# Patient Record
Sex: MALE | Race: WHITE | NOT HISPANIC OR LATINO
[De-identification: names, ages, dates, MRNs, and addresses within clinical notes are randomized per-mention and may not be internally consistent; named-entity substitution may affect disease eponyms.]

---

## 2017-01-05 ENCOUNTER — APPOINTMENT (OUTPATIENT)
Dept: HEART AND VASCULAR | Facility: CLINIC | Age: 67
End: 2017-01-05

## 2017-02-13 ENCOUNTER — APPOINTMENT (OUTPATIENT)
Dept: HEART AND VASCULAR | Facility: CLINIC | Age: 67
End: 2017-02-13

## 2017-02-13 VITALS
SYSTOLIC BLOOD PRESSURE: 132 MMHG | WEIGHT: 230 LBS | DIASTOLIC BLOOD PRESSURE: 78 MMHG | HEIGHT: 68 IN | BODY MASS INDEX: 34.86 KG/M2

## 2017-02-13 VITALS — HEART RATE: 64 BPM

## 2017-02-14 LAB — CK SERPL-CCNC: 349 U/L

## 2017-02-15 ENCOUNTER — FORM ENCOUNTER (OUTPATIENT)
Age: 67
End: 2017-02-15

## 2017-02-16 ENCOUNTER — INPATIENT (INPATIENT)
Facility: HOSPITAL | Age: 67
LOS: 7 days | Discharge: HOME | DRG: 234 | End: 2017-02-24
Attending: THORACIC SURGERY (CARDIOTHORACIC VASCULAR SURGERY) | Admitting: THORACIC SURGERY (CARDIOTHORACIC VASCULAR SURGERY)
Payer: MEDICARE

## 2017-02-16 ENCOUNTER — OUTPATIENT (OUTPATIENT)
Dept: OUTPATIENT SERVICES | Facility: HOSPITAL | Age: 67
LOS: 1 days | End: 2017-02-16
Payer: MEDICARE

## 2017-02-16 VITALS
HEART RATE: 81 BPM | DIASTOLIC BLOOD PRESSURE: 75 MMHG | RESPIRATION RATE: 18 BRPM | TEMPERATURE: 98 F | OXYGEN SATURATION: 98 % | SYSTOLIC BLOOD PRESSURE: 152 MMHG

## 2017-02-16 DIAGNOSIS — E11.9 TYPE 2 DIABETES MELLITUS WITHOUT COMPLICATIONS: ICD-10-CM

## 2017-02-16 DIAGNOSIS — I10 ESSENTIAL (PRIMARY) HYPERTENSION: ICD-10-CM

## 2017-02-16 DIAGNOSIS — I25.10 ATHEROSCLEROTIC HEART DISEASE OF NATIVE CORONARY ARTERY WITHOUT ANGINA PECTORIS: ICD-10-CM

## 2017-02-16 DIAGNOSIS — Z95.5 PRESENCE OF CORONARY ANGIOPLASTY IMPLANT AND GRAFT: Chronic | ICD-10-CM

## 2017-02-16 DIAGNOSIS — R94.39 ABNORMAL RESULT OF OTHER CARDIOVASCULAR FUNCTION STUDY: ICD-10-CM

## 2017-02-16 DIAGNOSIS — E78.5 HYPERLIPIDEMIA, UNSPECIFIED: ICD-10-CM

## 2017-02-16 DIAGNOSIS — N28.9 DISORDER OF KIDNEY AND URETER, UNSPECIFIED: ICD-10-CM

## 2017-02-16 LAB
ALBUMIN SERPL ELPH-MCNC: 3.6 G/DL — SIGNIFICANT CHANGE UP (ref 3.4–5)
ALP SERPL-CCNC: 50 U/L — SIGNIFICANT CHANGE UP (ref 40–120)
ALT FLD-CCNC: 48 U/L — HIGH (ref 12–42)
ANION GAP SERPL CALC-SCNC: 8 MMOL/L — LOW (ref 9–16)
APTT BLD: 31.8 SEC — SIGNIFICANT CHANGE UP (ref 27.5–37.4)
AST SERPL-CCNC: 34 U/L — SIGNIFICANT CHANGE UP (ref 15–37)
BASOPHILS NFR BLD AUTO: 0.5 % — SIGNIFICANT CHANGE UP (ref 0–2)
BILIRUB SERPL-MCNC: 0.5 MG/DL — SIGNIFICANT CHANGE UP (ref 0.2–1.2)
BUN SERPL-MCNC: 32 MG/DL — HIGH (ref 7–23)
CALCIUM SERPL-MCNC: 8.6 MG/DL — SIGNIFICANT CHANGE UP (ref 8.5–10.5)
CHLORIDE SERPL-SCNC: 107 MMOL/L — SIGNIFICANT CHANGE UP (ref 96–108)
CK MB CFR SERPL CALC: 2.5 NG/ML — SIGNIFICANT CHANGE UP (ref 0.5–3.6)
CK SERPL-CCNC: 332 U/L — HIGH (ref 39–308)
CO2 SERPL-SCNC: 23 MMOL/L — SIGNIFICANT CHANGE UP (ref 22–31)
CREAT SERPL-MCNC: 1.25 MG/DL — SIGNIFICANT CHANGE UP (ref 0.5–1.3)
EOSINOPHIL NFR BLD AUTO: 3.1 % — SIGNIFICANT CHANGE UP (ref 0–6)
GLUCOSE SERPL-MCNC: 148 MG/DL — HIGH (ref 70–99)
HCT VFR BLD CALC: 42.1 % — SIGNIFICANT CHANGE UP (ref 39–50)
HGB BLD-MCNC: 14.1 G/DL — SIGNIFICANT CHANGE UP (ref 13–17)
INR BLD: 1.04 — SIGNIFICANT CHANGE UP (ref 0.88–1.16)
LYMPHOCYTES # BLD AUTO: 26.8 % — SIGNIFICANT CHANGE UP (ref 13–44)
MCHC RBC-ENTMCNC: 31.6 PG — SIGNIFICANT CHANGE UP (ref 27–34)
MCHC RBC-ENTMCNC: 33.5 G/DL — SIGNIFICANT CHANGE UP (ref 32–36)
MCV RBC AUTO: 94.4 FL — SIGNIFICANT CHANGE UP (ref 80–100)
MONOCYTES NFR BLD AUTO: 7.8 % — SIGNIFICANT CHANGE UP (ref 2–14)
NEUTROPHILS NFR BLD AUTO: 61.8 % — SIGNIFICANT CHANGE UP (ref 43–77)
PLATELET # BLD AUTO: 182 K/UL — SIGNIFICANT CHANGE UP (ref 150–400)
POTASSIUM SERPL-MCNC: 4.7 MMOL/L — SIGNIFICANT CHANGE UP (ref 3.5–5.3)
POTASSIUM SERPL-SCNC: 4.7 MMOL/L — SIGNIFICANT CHANGE UP (ref 3.5–5.3)
PROT SERPL-MCNC: 7.3 G/DL — SIGNIFICANT CHANGE UP (ref 6.4–8.2)
PROTHROM AB SERPL-ACNC: 11.5 SEC — SIGNIFICANT CHANGE UP (ref 10–13.1)
RBC # BLD: 4.46 M/UL — SIGNIFICANT CHANGE UP (ref 4.2–5.8)
RBC # FLD: 14.2 % — SIGNIFICANT CHANGE UP (ref 10.3–16.9)
SODIUM SERPL-SCNC: 138 MMOL/L — SIGNIFICANT CHANGE UP (ref 135–145)
TROPONIN I SERPL-MCNC: <0.015 NG/ML — SIGNIFICANT CHANGE UP (ref 0.01–0.04)
WBC # BLD: 6.5 K/UL — SIGNIFICANT CHANGE UP (ref 3.8–10.5)
WBC # FLD AUTO: 6.5 K/UL — SIGNIFICANT CHANGE UP (ref 3.8–10.5)

## 2017-02-16 PROCEDURE — 78452 HT MUSCLE IMAGE SPECT MULT: CPT | Mod: 26

## 2017-02-16 PROCEDURE — 33533 CABG ARTERIAL SINGLE: CPT | Mod: AS

## 2017-02-16 PROCEDURE — 33517 CABG ARTERY-VEIN SINGLE: CPT | Mod: AS

## 2017-02-16 PROCEDURE — 93018 CV STRESS TEST I&R ONLY: CPT

## 2017-02-16 PROCEDURE — 71010: CPT | Mod: 26

## 2017-02-16 PROCEDURE — 93016 CV STRESS TEST SUPVJ ONLY: CPT

## 2017-02-16 PROCEDURE — 93010 ELECTROCARDIOGRAM REPORT: CPT

## 2017-02-16 PROCEDURE — 99285 EMERGENCY DEPT VISIT HI MDM: CPT | Mod: 25

## 2017-02-16 PROCEDURE — 93458 L HRT ARTERY/VENTRICLE ANGIO: CPT | Mod: 26

## 2017-02-16 PROCEDURE — 99222 1ST HOSP IP/OBS MODERATE 55: CPT

## 2017-02-16 RX ORDER — LISINOPRIL 2.5 MG/1
20 TABLET ORAL DAILY
Qty: 0 | Refills: 0 | Status: DISCONTINUED | OUTPATIENT
Start: 2017-02-16 | End: 2017-02-17

## 2017-02-16 RX ORDER — DEXTROSE 50 % IN WATER 50 %
25 SYRINGE (ML) INTRAVENOUS ONCE
Qty: 0 | Refills: 0 | Status: DISCONTINUED | OUTPATIENT
Start: 2017-02-16 | End: 2017-02-20

## 2017-02-16 RX ORDER — CHLORHEXIDINE GLUCONATE 213 G/1000ML
1 SOLUTION TOPICAL ONCE
Qty: 0 | Refills: 0 | Status: DISCONTINUED | OUTPATIENT
Start: 2017-02-16 | End: 2017-02-16

## 2017-02-16 RX ORDER — INSULIN LISPRO 100/ML
VIAL (ML) SUBCUTANEOUS
Qty: 0 | Refills: 0 | Status: DISCONTINUED | OUTPATIENT
Start: 2017-02-16 | End: 2017-02-20

## 2017-02-16 RX ORDER — SODIUM CHLORIDE 9 MG/ML
250 INJECTION INTRAMUSCULAR; INTRAVENOUS; SUBCUTANEOUS ONCE
Qty: 0 | Refills: 0 | Status: COMPLETED | OUTPATIENT
Start: 2017-02-16 | End: 2017-02-16

## 2017-02-16 RX ORDER — ASPIRIN/CALCIUM CARB/MAGNESIUM 324 MG
81 TABLET ORAL DAILY
Qty: 0 | Refills: 0 | Status: DISCONTINUED | OUTPATIENT
Start: 2017-02-16 | End: 2017-02-20

## 2017-02-16 RX ORDER — HEPARIN SODIUM 5000 [USP'U]/ML
INJECTION INTRAVENOUS; SUBCUTANEOUS
Qty: 25000 | Refills: 0 | Status: DISCONTINUED | OUTPATIENT
Start: 2017-02-16 | End: 2017-02-17

## 2017-02-16 RX ORDER — HEPARIN SODIUM 5000 [USP'U]/ML
6000 INJECTION INTRAVENOUS; SUBCUTANEOUS EVERY 6 HOURS
Qty: 0 | Refills: 0 | Status: DISCONTINUED | OUTPATIENT
Start: 2017-02-16 | End: 2017-02-17

## 2017-02-16 RX ORDER — SODIUM CHLORIDE 9 MG/ML
1000 INJECTION, SOLUTION INTRAVENOUS
Qty: 0 | Refills: 0 | Status: DISCONTINUED | OUTPATIENT
Start: 2017-02-16 | End: 2017-02-20

## 2017-02-16 RX ORDER — CLOPIDOGREL BISULFATE 75 MG/1
600 TABLET, FILM COATED ORAL ONCE
Qty: 0 | Refills: 0 | Status: COMPLETED | OUTPATIENT
Start: 2017-02-16 | End: 2017-02-16

## 2017-02-16 RX ORDER — SIMVASTATIN 20 MG/1
0 TABLET, FILM COATED ORAL
Qty: 0 | Refills: 0 | COMMUNITY

## 2017-02-16 RX ORDER — ASPIRIN/CALCIUM CARB/MAGNESIUM 324 MG
325 TABLET ORAL ONCE
Qty: 0 | Refills: 0 | Status: COMPLETED | OUTPATIENT
Start: 2017-02-16 | End: 2017-02-16

## 2017-02-16 RX ORDER — DEXTROSE 50 % IN WATER 50 %
1 SYRINGE (ML) INTRAVENOUS ONCE
Qty: 0 | Refills: 0 | Status: DISCONTINUED | OUTPATIENT
Start: 2017-02-16 | End: 2017-02-20

## 2017-02-16 RX ORDER — SODIUM CHLORIDE 9 MG/ML
1000 INJECTION INTRAMUSCULAR; INTRAVENOUS; SUBCUTANEOUS
Qty: 0 | Refills: 0 | Status: DISCONTINUED | OUTPATIENT
Start: 2017-02-16 | End: 2017-02-18

## 2017-02-16 RX ORDER — SIMVASTATIN 20 MG/1
40 TABLET, FILM COATED ORAL AT BEDTIME
Qty: 0 | Refills: 0 | Status: DISCONTINUED | OUTPATIENT
Start: 2017-02-16 | End: 2017-02-20

## 2017-02-16 RX ORDER — GLUCAGON INJECTION, SOLUTION 0.5 MG/.1ML
1 INJECTION, SOLUTION SUBCUTANEOUS ONCE
Qty: 0 | Refills: 0 | Status: DISCONTINUED | OUTPATIENT
Start: 2017-02-16 | End: 2017-02-20

## 2017-02-16 RX ORDER — DEXTROSE 50 % IN WATER 50 %
12.5 SYRINGE (ML) INTRAVENOUS ONCE
Qty: 0 | Refills: 0 | Status: DISCONTINUED | OUTPATIENT
Start: 2017-02-16 | End: 2017-02-20

## 2017-02-16 RX ORDER — HEPARIN SODIUM 5000 [USP'U]/ML
5000 INJECTION INTRAVENOUS; SUBCUTANEOUS ONCE
Qty: 0 | Refills: 0 | Status: COMPLETED | OUTPATIENT
Start: 2017-02-16 | End: 2017-02-16

## 2017-02-16 RX ADMIN — SODIUM CHLORIDE 500 MILLILITER(S): 9 INJECTION INTRAMUSCULAR; INTRAVENOUS; SUBCUTANEOUS at 15:41

## 2017-02-16 RX ADMIN — HEPARIN SODIUM 5000 UNIT(S): 5000 INJECTION INTRAVENOUS; SUBCUTANEOUS at 13:28

## 2017-02-16 RX ADMIN — HEPARIN SODIUM 1000 UNIT(S)/HR: 5000 INJECTION INTRAVENOUS; SUBCUTANEOUS at 13:28

## 2017-02-16 RX ADMIN — Medication 325 MILLIGRAM(S): at 14:22

## 2017-02-16 RX ADMIN — CLOPIDOGREL BISULFATE 600 MILLIGRAM(S): 75 TABLET, FILM COATED ORAL at 13:28

## 2017-02-16 NOTE — ED PROVIDER NOTE - MEDICAL DECISION MAKING DETAILS
hx of CAD- 5 cardiac stents- HTN  DM  abn stress test with MALAIKA  after 5 min on Navjot protocol- will cath today  iv heparin per dr bolden   no ekg changes noted on arrival to ED

## 2017-02-16 NOTE — H&P ADULT. - ASSESSMENT
67 y/o M PMHx HTN, hyperlipidemia, DM2, CAD s/p multiple PCI's @[facility] (05/24/2005 PCI to mLAD x2, pLAD and 04/18/2005 PCI to mid/prox/distal RCA, OM2), renal insufficiency (per pt) with recent abnormal NST and known CAD who is now being admitted to Cardiology with plan for cardiac catheterization with possible intervention if clinically warranted to assess CAD progression.

## 2017-02-16 NOTE — ED CLERICAL - NS ED CLERK NOTE PRE-ARRIVAL INFORMATION; ADDITIONAL PRE-ARRIVAL INFORMATION
Adaldi Nunes sent by dr Sharpe from stress inferior elevation now resolved and + finding on imaging needs admition for cath

## 2017-02-16 NOTE — PATIENT PROFILE ADULT. - REASON FOR ADMISSION
Went for a routine stress test and results were positive. Patient denies any symptoms prior to checkup/est.

## 2017-02-16 NOTE — ED ADULT NURSE NOTE - CHPI ED SYMPTOMS NEG
no nausea/no chills/no fever/no cough/no back pain/no syncope/no shortness of breath/no dizziness/no vomiting

## 2017-02-16 NOTE — ED ADULT NURSE NOTE - OBJECTIVE STATEMENT
Pt had stress this morning, was told to go to ED due to abnormal stress test, pt denies chest pain 0/10, shortness of breathe, palpitations, dizziness. Pt placed on cardiac continuous monitoring, NSR, wife with pt at bedside.

## 2017-02-16 NOTE — H&P ADULT. - PROBLEM SELECTOR PLAN 2
- BP controlled, in /75  - Continue Quinapril 20mg - 's upon arrival, currently 153/84. Will continue to monitor.  - Continue Quinapril 20mg

## 2017-02-16 NOTE — H&P ADULT. - PROBLEM SELECTOR PLAN 5
- In ED, BUN/Cr 32/ 1.25, GFR 59  -  cc IV bolus given prior to cardiac catheterization, followed by NS @ 75cc/hr  - EF 55% by verbal report from Dr. Sharpe's office, pt is euvolemic on exam and CTAB

## 2017-02-16 NOTE — H&P ADULT. - PROBLEM SELECTOR PLAN 1
- Pt currently CP free  - Troponin negative x 1, serial troponins  - EKG in ER NSR @ 75 BPM  - Given ASA 325mg and Plavix 600mg prior to cardiac cath  - Heparin gtt started in the ED  - Cardiac cath scheduled for today 02/16/2017 - Pt currently CP free  - Troponin negative x 1, serial troponins  - EKG in ER NSR @ 75 BPM  - Given ASA 325mg and Plavix 600mg prior to cardiac cath  - Heparin gtt started in the ED  - NPO prior to Cardiac Cath scheduled for today 02/16/2017

## 2017-02-16 NOTE — ED PROVIDER NOTE - OBJECTIVE STATEMENT
67 yo male with hx of CAD HTN DM HLD  5 cardiac stents  last stent 2005 off plavix in 2006 - scheduled for routine stress test today-  after 5 min of Navjot protocol dev CP  - no sob  no N/V - no diaphoresis- no syncope or palpitations -pt sent to Ed for further eval-  no current  CP or SOB

## 2017-02-16 NOTE — H&P ADULT. - HISTORY OF PRESENT ILLNESS
Pt is accompanied by wife Emmett Sharpe [phone]    65 y/o M PMH HTN, hyperlipidemia, DM2, CAD s/p multiple PCI's @[facility] (05/24/2005 PCI to mLAD x2 and pLAD and 04/18/2005 PCI to mid, prox and distal RCA and OM2), renal insufficiency who was sent to the [facility] ED 02/16/17 in the setting of an abnormal routine Nuclear Stress Test revealing moderately abnormal myocardial perfusion images consistent with stress-induced ischemia in the inferior wall. During the test he did not have chest pain, but he did have ST segment depression during exercise and ST elevation which resolved during recovery. He was recently seen by his cardiologist, Dr. Sharpe on 02/13/17 for a routine checkup. States lately he has been feeling well, however, prior to his prior abnormal angiogram and multiple PCI's he was asymptomatic. Denies any CP, SOB, palpitations, dizziness, PND, orthopnea. Of note, pt had an Echo performed 06/30/16 @ Dr. Sharpe's office which revealed LVEF 55% (per verbal report from physician's office).  In the ED, VSS /75, O2 99%, RR 16, T 98.3, EKG revealed NSR @ 74 BPM, Troponins negative x 1, BUN/ Cr 32/ 1.25, GFR 59. Pt was started on a Heparin gtt, given ASA 325mg and Plavix 600mg PO and given NS 250cc IV bolus bolus.    In light of pt's recent abnormal NST, risk factors as above, pt is now being admitted to Cardiology with plan for cardiac catheterization with possible intervention if clinically warranted to assess CAD progression. Pt is accompanied by wife Emmett Sharpe [phone]    67 y/o M PMH HTN, hyperlipidemia, DM2, CAD s/p multiple PCI's @[facility] (05/24/2005 PCI to mLAD x2, pLAD and 04/18/2005 PCI to mid/prox/distal RCA, OM2), renal insufficiency (per pt) who was sent to the [facility] ED 02/16/17 in the setting of an abnormal routine Nuclear Stress Test revealing moderately abnormal myocardial perfusion images consistent with stress-induced ischemia in the inferior wall. During the test he denied having chest pain, but he did have ST segment depression during exercise and ST elevation which resolved during recovery. He was recently seen by his cardiologist, Dr. Sharpe on 02/13/17 for a routine checkup. States lately he has been feeling well, however, prior to his previous abnormal angiogram and multiple PCI's he was asymptomatic. Denies any CP, SOB, palpitations, dizziness, PND, orthopnea, decreased ET. Of note, pt had an Echo performed 06/30/16 @ Dr. Sharpe's office which revealed LVEF 55% (per verbal report from physician's office).  In the ED, VSS /75, O2 99%, RR 16, T 98.3, EKG revealed NSR @ 74 BPM, Troponins negative x 1, BUN/ Cr 32/ 1.25, GFR 59. Pt was started on a Heparin gtt, given ASA 325mg and Plavix 600mg PO and given NS 250cc IV bolus bolus.    In light of pt's recent abnormal NST, risk factors as above, and known CAD pt is now being admitted to Cardiology with plan for cardiac catheterization with possible intervention if clinically warranted to assess CAD progression.

## 2017-02-17 DIAGNOSIS — I25.10 ATHEROSCLEROTIC HEART DISEASE OF NATIVE CORONARY ARTERY WITHOUT ANGINA PECTORIS: ICD-10-CM

## 2017-02-17 LAB
ANION GAP SERPL CALC-SCNC: 11 MMOL/L — SIGNIFICANT CHANGE UP (ref 9–16)
APPEARANCE UR: CLEAR — SIGNIFICANT CHANGE UP
BILIRUB UR-MCNC: NEGATIVE — SIGNIFICANT CHANGE UP
BUN SERPL-MCNC: 26 MG/DL — HIGH (ref 7–23)
CALCIUM SERPL-MCNC: 8.8 MG/DL — SIGNIFICANT CHANGE UP (ref 8.5–10.5)
CHLORIDE SERPL-SCNC: 106 MMOL/L — SIGNIFICANT CHANGE UP (ref 96–108)
CHOLEST SERPL-MCNC: 167 MG/DL — SIGNIFICANT CHANGE UP
CO2 SERPL-SCNC: 23 MMOL/L — SIGNIFICANT CHANGE UP (ref 22–31)
COLOR SPEC: YELLOW — SIGNIFICANT CHANGE UP
CREAT SERPL-MCNC: 1.21 MG/DL — SIGNIFICANT CHANGE UP (ref 0.5–1.3)
DIFF PNL FLD: (no result)
GLUCOSE SERPL-MCNC: 124 MG/DL — HIGH (ref 70–99)
GLUCOSE UR QL: 100
HBA1C BLD-MCNC: 7.6 % — HIGH (ref 4.8–5.6)
HCT VFR BLD CALC: 42.7 % — SIGNIFICANT CHANGE UP (ref 39–50)
HDLC SERPL-MCNC: 58 MG/DL — SIGNIFICANT CHANGE UP
HGB BLD-MCNC: 14.6 G/DL — SIGNIFICANT CHANGE UP (ref 13–17)
KETONES UR-MCNC: NEGATIVE — SIGNIFICANT CHANGE UP
LEUKOCYTE ESTERASE UR-ACNC: NEGATIVE — SIGNIFICANT CHANGE UP
LIPID PNL WITH DIRECT LDL SERPL: 87 MG/DL — SIGNIFICANT CHANGE UP
MAGNESIUM SERPL-MCNC: 2.3 MG/DL — SIGNIFICANT CHANGE UP (ref 1.6–2.4)
MCHC RBC-ENTMCNC: 31.4 PG — SIGNIFICANT CHANGE UP (ref 27–34)
MCHC RBC-ENTMCNC: 34.2 G/DL — SIGNIFICANT CHANGE UP (ref 32–36)
MCV RBC AUTO: 91.8 FL — SIGNIFICANT CHANGE UP (ref 80–100)
NITRITE UR-MCNC: NEGATIVE — SIGNIFICANT CHANGE UP
NT-PROBNP SERPL-SCNC: 139 PG/ML — SIGNIFICANT CHANGE UP
PH UR: 5 — SIGNIFICANT CHANGE UP (ref 4–8)
PLATELET # BLD AUTO: 203 K/UL — SIGNIFICANT CHANGE UP (ref 150–400)
POTASSIUM SERPL-MCNC: 4.2 MMOL/L — SIGNIFICANT CHANGE UP (ref 3.5–5.3)
POTASSIUM SERPL-SCNC: 4.2 MMOL/L — SIGNIFICANT CHANGE UP (ref 3.5–5.3)
PROT UR-MCNC: NEGATIVE MG/DL — SIGNIFICANT CHANGE UP
RBC # BLD: 4.65 M/UL — SIGNIFICANT CHANGE UP (ref 4.2–5.8)
RBC # FLD: 13.9 % — SIGNIFICANT CHANGE UP (ref 10.3–16.9)
SODIUM SERPL-SCNC: 140 MMOL/L — SIGNIFICANT CHANGE UP (ref 135–145)
SP GR SPEC: 1.02 — SIGNIFICANT CHANGE UP (ref 1–1.03)
TOTAL CHOLESTEROL/HDL RATIO MEASUREMENT: 2.9 RATIO — SIGNIFICANT CHANGE UP
TRIGL SERPL-MCNC: 109 MG/DL — SIGNIFICANT CHANGE UP
TSH SERPL-MCNC: 2.02 UIU/ML — SIGNIFICANT CHANGE UP (ref 0.35–4.94)
UROBILINOGEN FLD QL: 0.2 E.U./DL — SIGNIFICANT CHANGE UP
WBC # BLD: 7.8 K/UL — SIGNIFICANT CHANGE UP (ref 3.8–10.5)
WBC # FLD AUTO: 7.8 K/UL — SIGNIFICANT CHANGE UP (ref 3.8–10.5)

## 2017-02-17 PROCEDURE — 99233 SBSQ HOSP IP/OBS HIGH 50: CPT

## 2017-02-17 PROCEDURE — 93306 TTE W/DOPPLER COMPLETE: CPT | Mod: 26

## 2017-02-17 PROCEDURE — 93880 EXTRACRANIAL BILAT STUDY: CPT | Mod: 26

## 2017-02-17 PROCEDURE — 99223 1ST HOSP IP/OBS HIGH 75: CPT

## 2017-02-17 RX ORDER — LISINOPRIL 2.5 MG/1
10 TABLET ORAL DAILY
Qty: 0 | Refills: 0 | Status: DISCONTINUED | OUTPATIENT
Start: 2017-02-18 | End: 2017-02-18

## 2017-02-17 RX ORDER — BACITRACIN ZINC 500 UNIT/G
1 OINTMENT IN PACKET (EA) TOPICAL
Qty: 0 | Refills: 0 | Status: DISCONTINUED | OUTPATIENT
Start: 2017-02-17 | End: 2017-02-20

## 2017-02-17 RX ORDER — METOPROLOL TARTRATE 50 MG
12.5 TABLET ORAL DAILY
Qty: 0 | Refills: 0 | Status: DISCONTINUED | OUTPATIENT
Start: 2017-02-17 | End: 2017-02-17

## 2017-02-17 RX ORDER — METOPROLOL TARTRATE 50 MG
12.5 TABLET ORAL
Qty: 0 | Refills: 0 | Status: DISCONTINUED | OUTPATIENT
Start: 2017-02-17 | End: 2017-02-18

## 2017-02-17 RX ADMIN — Medication 1: at 11:46

## 2017-02-17 RX ADMIN — LISINOPRIL 20 MILLIGRAM(S): 2.5 TABLET ORAL at 07:30

## 2017-02-17 RX ADMIN — Medication 1 APPLICATION(S): at 22:50

## 2017-02-17 RX ADMIN — Medication 1: at 21:37

## 2017-02-17 RX ADMIN — Medication 81 MILLIGRAM(S): at 11:46

## 2017-02-17 RX ADMIN — SIMVASTATIN 40 MILLIGRAM(S): 20 TABLET, FILM COATED ORAL at 21:37

## 2017-02-17 RX ADMIN — Medication 12.5 MILLIGRAM(S): at 18:23

## 2017-02-17 NOTE — CONSULT NOTE ADULT - ASSESSMENT
66y Male w/ pmhx HTN, HLD, DM2, CAD s/p multiple PCI's at [facility] (5/24/05 PCI to mLADx2, pLAD and 4/18/05 PCI to mid/prox/distal RCA, OM2), renal insufficiency was sent to [facility] ED on 2/16 in the setting of an abnormal nuclear stress test revealing moderately abnormal myocardial perfusion images consistent with stress-induced ischemia in the inferior wall. Pt admitted to cardiology on 2/16 for cardiac catherization that revealed 3VCAD LM normal, midLAD 85%, proxD1 80%, midLCx 80%, proxRCA 100% with left to right collaterals, EF 55%, EDP 17. Pt is now consulted with cardiothoracic surgery for possible surgical intervention. pt denies any neurovascular accidents in the past.  -preoperative work up: pending carotid duplex b/l, bedside PFTs, TTE, labs: TSH, BNP, UA  -active T&S  -plan for possible CABG this Monday 66y Male w/ pmhx HTN, HLD, DM2, CAD s/p multiple PCI's at [facility] (5/24/05 PCI to mLADx2, pLAD and 4/18/05 PCI to mid/prox/distal RCA, OM2), renal insufficiency was sent to [facility] ED on 2/16 in the setting of an abnormal nuclear stress test revealing moderately abnormal myocardial perfusion images consistent with stress-induced ischemia in the inferior wall. Pt admitted to cardiology on 2/16 for cardiac catherization that revealed 3VCAD LM normal, midLAD 85%, proxD1 80%, midLCx 80%, proxRCA 100% with left to right collaterals, EF 55%, EDP 17. Pt is now consulted with cardiothoracic surgery for possible surgical intervention. pt denies any neurovascular accidents in the past.  -preoperative work up: pending carotid duplex b/l, bedside PFTs, TTE, labs: TSH, BNP, UA  -active T&S  -uptitrate lopressor as BP/HR can tolerate  -plan for possible CABG this Monday  -d/c lisinopril 24hours prior to surgical procedure

## 2017-02-17 NOTE — PROGRESS NOTE ADULT - ASSESSMENT
65 y/o M PMH HTN, hyperlipidemia, DM2, CAD s/p multiple PCI's @[facility] (05/24/2005 PCI to mLAD x2, pLAD and 04/18/2005 PCI to mid/prox/distal RCA, OM2), renal insufficiency (per pt), who had an abnl outpatient NST, s/p dx cardiac cath revealing 3VD. Now awaiting CTS on Monday.

## 2017-02-17 NOTE — PROGRESS NOTE ADULT - PROBLEM SELECTOR PLAN 4
FS, RAPHAEL, hold oral agents while in house. HgA1C 7.6%. Goal is <6%. Monitor FS, con't RAPHAEL, hold oral agents while in house.

## 2017-02-17 NOTE — PROGRESS NOTE ADULT - PROBLEM SELECTOR PLAN 1
s/p diagnostic cath on 2/16 revealing 85% mLAD, 80% pD1, 80%  mLCx, 100% pRCA w/ left to right collaterals, EF 55%, EDP 17.  CTS consulted.  Surgery planned for Monday 2/20. Pt to undergo pre-op workup. Will f/u results.   Con't Aspirin EC 81mg daily and Simvastatin 40mg QHS. s/p diagnostic cath on 2/16 revealing 85% mLAD, 80% pD1, 80%  mLCx, 100% pRCA w/ left to right collaterals, EF 55%, EDP 17.  CTS consulted (Dr Mckeon)   Surgery planned for Monday 2/20. Pt to undergo pre-op workup. Will f/u results.   Con't Aspirin EC 81mg daily and Simvastatin 40mg QHS.

## 2017-02-17 NOTE — CONSULT NOTE ADULT - SUBJECTIVE AND OBJECTIVE BOX
Surgeon: Nathaniel Mckeon    Requesting Physician: rKistofer Sharpe    HISTORY OF PRESENT ILLNESS:  66y Male w/ pmhx HTN, HLD, DM2, CAD s/p multiple PCI's at [facility] (05 PCI to mLADx2, pLAD and 05 PCI to mid/prox/distal RCA, OM2), renal insufficiency was sent to [facility] ED on  in the setting of an abnormal nuclear stress test revealing moderately abnormal myocardial perfusion images consistent with stress-induced ischemia in the inferior wall. Pt states he has been feeling well in the past couple of months; however, prior to his multiple PCI procedures in the past, patient was asymptomatic. Pt denies orthopnea, chest pain, COTA, SOB dizziness, syncope. Pt had     PAST MEDICAL & SURGICAL HISTORY:  HTN (hypertension)  HLD (hyperlipidemia)  DM type 2 (diabetes mellitus, type 2)  H/O heart artery stent      MEDICATIONS  (STANDING):  sodium chloride 0.9%. 1000milliLiter(s) IV Continuous <Continuous>  aspirin  chewable 81milliGRAM(s) Oral daily  lisinopril 20milliGRAM(s) Oral daily  simvastatin 40milliGRAM(s) Oral at bedtime  insulin lispro (HumaLOG) corrective regimen sliding scale  SubCutaneous Before meals and at bedtime  dextrose 5%. 1000milliLiter(s) IV Continuous <Continuous>  dextrose 50% Injectable 12.5Gram(s) IV Push once  dextrose 50% Injectable 25Gram(s) IV Push once  dextrose 50% Injectable 25Gram(s) IV Push once  metoprolol succinate ER 12.5milliGRAM(s) Oral daily    MEDICATIONS  (PRN):  dextrose Gel 1Dose(s) Oral once PRN Blood Glucose LESS THAN 70 milliGRAM(s)/deciliter  glucagon  Injectable 1milliGRAM(s) IntraMuscular once PRN Glucose LESS THAN 70 milligrams/deciliter      Allergies    No Known Allergies    Intolerances        SOCIAL HISTORY:  Smoker:  YES / NO        PACK YEARS:                         WHEN QUIT?  ETOH use:  YES / NO               FREQUENCY / QUANTITY:  Ilicit Drug use:  YES / NO  Occupation:  Assisted device use (Cane / Walker):  Live with:    FAMILY HISTORY:  No pertinent family history in first degree relatives      Review of Systems  CONSTITUTIONAL:  Fevers / chills, sweats, fatigue, weight loss, weight gain                                    NEGATIVE  NEURO:  parathesias, seizures, syncope, confusion                                                                               NEGATIVE  EYES:  Blurry vision, discharge, pain, loss of vision                                                                                  NEGATIVE  ENMT:  Difficulty hearing, vertigo, dysphagia, epistaxis, recent dental work                                     NEGATIVE  CV:  Chest pain, palpitations, COTA, orthopnea                                                                                         NEGATIVE  RESPIRATORY:  Wheezing, SOB, cough / sputum, hemoptysis                                                              NEGATIVE  GI:  Nausea, vommiting, diarrhea, constipation, melena                                                                        NEGATIVE  : Hematuria, dysuria, urgency, incontinence                                                                                       NEGATIVE  MUSKULOSKELETAL:  arthritis, joint swelling, muscle weakness                                                           NEGATIVE  SKIN/BREAST:  rash, itching, jermaine loss, masses                                                                                            NEGATIVE  PSYCH:  depresion, anxiety, suicidal ideation                                                                                             NEGATIVE  HEME/LYMPH:  bruises easily, enlarged lymph nodes, tender lymph nodes                                        NEGATIVE  ENDOCRINE:  cold intolerance, heat intolerance, polydipsia                                                                   NEGATIVE    PHYSICAL EXAM  Vital Signs Last 24 Hrs  T(C): 36.7, Max: 36.9 ( @ 22:02)  T(F): 98, Max: 98.4 ( @ 22:02)  HR: 77 (72 - 85)  BP: 148/84 (148/84 - 207/90)  BP(mean): 115 (100 - 115)  RR: 18 (16 - 18)  SpO2: 96% (96% - 100%)    CONSTITUTIONAL:                                                                          WNL  NEURO:                                                                                             WNL                      EYES:                                                                                                  WNL  ENMT:                                                                                                WNL  CV:                                                                                                      WNL  RESPIRATORY:                                                                                  WNL  GI:                                                                                                       WNL  : BENITEZ + / -                                                                                 WNL  MUSKULOSKELETAL:                                                                       WNL  SKIN / BREAST:                                                                                 WNL                                                          LABS:                        14.6   7.8   )-----------( 203      ( 2017 07:13 )             42.7     2017 07:12    140    |  106    |  26     ----------------------------<  124    4.2     |  23     |  1.21     Ca    8.8        2017 07:12  Mg     2.3       2017 07:12    TPro  7.3    /  Alb  3.6    /  TBili  0.5    /  DBili  x      /  AST  34     /  ALT  48     /  AlkPhos  50     2017 11:58    PT/INR - ( 2017 11:58 )   PT: 11.5 sec;   INR: 1.04          PTT - ( 2017 11:58 )  PTT:31.8 sec  Urinalysis Basic - ( 2017 11:28 )    Color: Yellow / Appearance: Clear / S.020 / pH: x  Gluc: x / Ketone: NEGATIVE  / Bili: NEGATIVE / Urobili: 0.2 E.U./dL   Blood: x / Protein: NEGATIVE mg/dL / Nitrite: NEGATIVE   Leuk Esterase: NEGATIVE / RBC: < 5 /HPF / WBC < 5 /HPF   Sq Epi: x / Non Sq Epi: Rare /HPF / Bacteria: Present /HPF      CARDIAC MARKERS ( 2017 11:58 )  <0.015 ng/mL / x     / 332 U/L / x     / 2.5 ng/mL          RADIOLOGY & ADDITIONAL STUDIES:  CAROTID U/S:    CXR:    CT Scan:    EKG:    TTE / DESEAN:    Cardiac Cath: Surgeon: Nathaniel Mckeon    Requesting Physician: Kristofer Sharpe    HISTORY OF PRESENT ILLNESS:  66y Male w/ pmhx HTN, HLD, DM2, CAD s/p multiple PCI's at [facility] (05 PCI to mLADx2, pLAD and 05 PCI to mid/prox/distal RCA, OM2), renal insufficiency was sent to [facility] ED on  in the setting of an abnormal nuclear stress test revealing moderately abnormal myocardial perfusion images consistent with stress-induced ischemia in the inferior wall. Pt states he has been feeling well in the past couple of months; however, prior to his multiple PCI procedures in the past, patient was asymptomatic. Pt denies orthopnea, chest pain, COTA, SOB dizziness, syncope. Pt admitted to cardiology on  for cardiac catherization that revealed 3VCAD LM normal, midLAD 85%, proxD1 80%, midLCx 80%, proxRCA 100% with left to right collaterals, EF 55%, EDP 17. Pt is now consulted for possible surgical intervention.    PAST MEDICAL & SURGICAL HISTORY:  HTN (hypertension)  HLD (hyperlipidemia)  DM type 2 (diabetes mellitus, type 2)  H/O heart artery stent      MEDICATIONS  (STANDING):  sodium chloride 0.9%. 1000milliLiter(s) IV Continuous <Continuous>  aspirin  chewable 81milliGRAM(s) Oral daily  lisinopril 20milliGRAM(s) Oral daily  simvastatin 40milliGRAM(s) Oral at bedtime  insulin lispro (HumaLOG) corrective regimen sliding scale  SubCutaneous Before meals and at bedtime  dextrose 5%. 1000milliLiter(s) IV Continuous <Continuous>  dextrose 50% Injectable 12.5Gram(s) IV Push once  dextrose 50% Injectable 25Gram(s) IV Push once  dextrose 50% Injectable 25Gram(s) IV Push once  metoprolol succinate ER 12.5milliGRAM(s) Oral daily    MEDICATIONS  (PRN):  dextrose Gel 1Dose(s) Oral once PRN Blood Glucose LESS THAN 70 milliGRAM(s)/deciliter  glucagon  Injectable 1milliGRAM(s) IntraMuscular once PRN Glucose LESS THAN 70 milligrams/deciliter      Allergies    No Known Allergies    Intolerances        SOCIAL HISTORY:  Smoker:  No  ETOH use: No  Ilicit Drug use: No  Occupation: retired. owned bakery  Assisted device use (Cane / Walker): None  Live with: spouse at home    FAMILY HISTORY:  No pertinent family history in first degree relatives      Review of Systems  CONSTITUTIONAL:  Fevers / chills, sweats, fatigue, weight loss, weight gain                                    NEGATIVE  NEURO:  parathesias, seizures, syncope, confusion                                                                               NEGATIVE  EYES:  Blurry vision, discharge, pain, loss of vision                                                                                  NEGATIVE  ENMT:  Difficulty hearing, vertigo, dysphagia, epistaxis, recent dental work                                     NEGATIVE  CV:  Chest pain, palpitations, COTA, orthopnea                                                                                         NEGATIVE  RESPIRATORY:  Wheezing, SOB, cough / sputum, hemoptysis                                                              NEGATIVE  GI:  Nausea, vommiting, diarrhea, constipation, melena                                                                        NEGATIVE  : Hematuria, dysuria, urgency, incontinence                                                                                       NEGATIVE  MUSKULOSKELETAL:  arthritis, joint swelling, muscle weakness                                                           NEGATIVE  SKIN/BREAST:  rash, itching, jermaine loss, masses                                                                                            NEGATIVE  PSYCH:  depresion, anxiety, suicidal ideation                                                                                             NEGATIVE  HEME/LYMPH:  bruises easily, enlarged lymph nodes, tender lymph nodes                                        NEGATIVE  ENDOCRINE:  cold intolerance, heat intolerance, polydipsia                                                                   NEGATIVE    PHYSICAL EXAM  Vital Signs Last 24 Hrs  T(C): 36.7, Max: 36.9 ( @ 22:02)  T(F): 98, Max: 98.4 (-16 @ 22:02)  HR: 77 (72 - 85)  BP: 148/84 (148/84 - 207/90)  BP(mean): 115 (100 - 115)  RR: 18 (16 - 18)  SpO2: 96% (96% - 100%)    CONSTITUTIONAL:                                                                          WNL  NEURO:                                                                                             WNL                      EYES:                                                                                                  WNL  ENMT:                                                                                                WNL  CV:       RESPIRATORY:      GI:    : BENITEZ + / -       MUSKULOSKELETAL:            SKIN / BREAST:                                                                            LABS:                        14.6   7.8   )-----------( 203      ( 2017 07:13 )             42.7     2017 07:12    140    |  106    |  26     ----------------------------<  124    4.2     |  23     |  1.21     Ca    8.8        2017 07:12  Mg     2.3       2017 07:12    TPro  7.3    /  Alb  3.6    /  TBili  0.5    /  DBili  x      /  AST  34     /  ALT  48     /  AlkPhos  50     2017 11:58    PT/INR - ( 2017 11:58 )   PT: 11.5 sec;   INR: 1.04          PTT - ( 2017 11:58 )  PTT:31.8 sec  Urinalysis Basic - ( 2017 11:28 )    Color: Yellow / Appearance: Clear / S.020 / pH: x  Gluc: x / Ketone: NEGATIVE  / Bili: NEGATIVE / Urobili: 0.2 E.U./dL   Blood: x / Protein: NEGATIVE mg/dL / Nitrite: NEGATIVE   Leuk Esterase: NEGATIVE / RBC: < 5 /HPF / WBC < 5 /HPF   Sq Epi: x / Non Sq Epi: Rare /HPF / Bacteria: Present /HPF      CARDIAC MARKERS ( 2017 11:58 )  <0.015 ng/mL / x     / 332 U/L / x     / 2.5 ng/mL          RADIOLOGY & ADDITIONAL STUDIES:  CAROTID U/S:    CXR:    CT Scan:    EKG:    TTE / DESEAN:    Cardiac Cath: Surgeon: Nathaniel Mckeon    Requesting Physician: Kristofer Sharpe    HISTORY OF PRESENT ILLNESS:  66y Male w/ pmhx HTN, HLD, DM2, CAD s/p multiple PCI's at [facility] (05 PCI to mLADx2, pLAD and 05 PCI to mid/prox/distal RCA, OM2), renal insufficiency was sent to [facility] ED on  in the setting of an abnormal nuclear stress test revealing moderately abnormal myocardial perfusion images consistent with stress-induced ischemia in the inferior wall. Pt states he has been feeling well in the past couple of months; however, prior to his multiple PCI procedures in the past, patient was asymptomatic. Pt denies orthopnea, chest pain, COTA, SOB dizziness, syncope. Pt admitted to cardiology on  for cardiac catherization that revealed 3VCAD LM normal, midLAD 85%, proxD1 80%, midLCx 80%, proxRCA 100% with left to right collaterals, EF 55%, EDP 17. Pt is now consulted with cardiothoracic surgery for possible surgical intervention.    PAST MEDICAL & SURGICAL HISTORY:  HTN (hypertension)  HLD (hyperlipidemia)  DM type 2 (diabetes mellitus, type 2)  H/O heart artery stent      MEDICATIONS  (STANDING):  sodium chloride 0.9%. 1000milliLiter(s) IV Continuous <Continuous>  aspirin  chewable 81milliGRAM(s) Oral daily  lisinopril 20milliGRAM(s) Oral daily  simvastatin 40milliGRAM(s) Oral at bedtime  insulin lispro (HumaLOG) corrective regimen sliding scale  SubCutaneous Before meals and at bedtime  dextrose 5%. 1000milliLiter(s) IV Continuous <Continuous>  dextrose 50% Injectable 12.5Gram(s) IV Push once  dextrose 50% Injectable 25Gram(s) IV Push once  dextrose 50% Injectable 25Gram(s) IV Push once  metoprolol succinate ER 12.5milliGRAM(s) Oral daily    MEDICATIONS  (PRN):  dextrose Gel 1Dose(s) Oral once PRN Blood Glucose LESS THAN 70 milliGRAM(s)/deciliter  glucagon  Injectable 1milliGRAM(s) IntraMuscular once PRN Glucose LESS THAN 70 milligrams/deciliter      Allergies    No Known Allergies          SOCIAL HISTORY:  Smoker:  No  ETOH use: No  Ilicit Drug use: No  Occupation: retired. owned bakery  Assisted device use (Cane / Walker): None  Live with: spouse at home    FAMILY HISTORY:  No pertinent family history in first degree relatives      Review of Systems  CONSTITUTIONAL:  Fevers / chills, sweats, fatigue, weight loss, weight gain                                    NEGATIVE  NEURO:  parathesias, seizures, syncope, confusion                                                                               NEGATIVE  EYES:  Blurry vision, discharge, pain, loss of vision                                                                                  NEGATIVE  ENMT:  Difficulty hearing, vertigo, dysphagia, epistaxis, recent dental work                                     NEGATIVE  CV:  Chest pain, palpitations, COTA, orthopnea                                                                                         NEGATIVE  RESPIRATORY:  Wheezing, SOB, cough / sputum, hemoptysis                                                              NEGATIVE  GI:  Nausea, vommiting, diarrhea, constipation, melena                                                                        NEGATIVE  : Hematuria, dysuria, urgency, incontinence                                                                                       NEGATIVE  MUSKULOSKELETAL:  arthritis, joint swelling, muscle weakness                                                           NEGATIVE  SKIN/BREAST:  rash, itching, jermaine loss, masses                                                                                            NEGATIVE  PSYCH:  depresion, anxiety, suicidal ideation                                                                                             NEGATIVE  HEME/LYMPH:  bruises easily, enlarged lymph nodes, tender lymph nodes                                        NEGATIVE  ENDOCRINE:  cold intolerance, heat intolerance, polydipsia                                                                   NEGATIVE    PHYSICAL EXAM  Vital Signs Last 24 Hrs  T(C): 36.7, Max: 36.9 (16 @ 22:02)  T(F): 98, Max: 98.4 (02-16 @ 22:02)  HR: 77 (72 - 85)  BP: 148/84 (148/84 - 207/90)  BP(mean): 115 (100 - 115)  RR: 18 (16 - 18)  SpO2: 96% (96% - 100%)    CONSTITUTIONAL:  well appearing male, conversing in full sentences in NAD  NEURO: A&Ox3, no focal deficits  EYES: EOMI, PERRLA  ENMT: nares patent, no JVD  CV: RRR, normal S1S2, no murmur appreciated  RESPIRATORY: CTAB, no wheezes/rales/rhonchi  GI: obese, soft, NT/ND  MUSKULOSKELETAL: sensation intact with motor function  VASCULAR: 2+ pedal pulses b/l, 2+ radial pulses b/l  SKIN / BREAST: no peripheral edema b/l, good color, WWP                                                          LABS:                        14.6   7.8   )-----------( 203      ( 2017 07:13 )             42.7     2017 07:12    140    |  106    |  26     ----------------------------<  124    4.2     |  23     |  1.21     Ca    8.8        2017 07:12  Mg     2.3       2017 07:12    TPro  7.3    /  Alb  3.6    /  TBili  0.5    /  DBili  x      /  AST  34     /  ALT  48     /  AlkPhos  50     2017 11:58    PT/INR - ( 2017 11:58 )   PT: 11.5 sec;   INR: 1.04          PTT - ( 2017 11:58 )  PTT:31.8 sec  Urinalysis Basic - ( 2017 11:28 )    Color: Yellow / Appearance: Clear / S.020 / pH: x  Gluc: x / Ketone: NEGATIVE  / Bili: NEGATIVE / Urobili: 0.2 E.U./dL   Blood: x / Protein: NEGATIVE mg/dL / Nitrite: NEGATIVE   Leuk Esterase: NEGATIVE / RBC: < 5 /HPF / WBC < 5 /HPF   Sq Epi: x / Non Sq Epi: Rare /HPF / Bacteria: Present /HPF      CARDIAC MARKERS ( 2017 11:58 )  <0.015 ng/mL / x     / 332 U/L / x     / 2.5 ng/mL          RADIOLOGY & ADDITIONAL STUDIES:  CAROTID U/S: pending    CXR:   EXAM:  XR CHEST 1 VIEW PORT IMMEDIATE                        PROCEDURE DATE:  2017    INTERPRETATION:  CLINICAL INDICATION: 66-year-old with chest pain.  FINDINGS: Portable view of the chest demonstrates low lung volumes.  Midline trachea. Normal heart size. No consolidation. No pleural  effusion. No active chest disease.    EK17 revealed NSR at 75bpm    Cardiac Cath:  Cardiac Cath 2017 3VCAD LM normal, midLAD 85%, proxD1 80%, midLCx 80%, proxRCA 100% with left to right collaterals, EF 55%, EDP 17

## 2017-02-17 NOTE — PROGRESS NOTE ADULT - SUBJECTIVE AND OBJECTIVE BOX
Interventional Cardiology PA Adult Progress Note    Subjective Assessment:  	  MEDICATIONS:  lisinopril 20milliGRAM(s) Oral daily            simvastatin 40milliGRAM(s) Oral at bedtime  insulin lispro (HumaLOG) corrective regimen sliding scale  SubCutaneous Before meals and at bedtime  dextrose Gel 1Dose(s) Oral once PRN  dextrose 50% Injectable 12.5Gram(s) IV Push once  dextrose 50% Injectable 25Gram(s) IV Push once  dextrose 50% Injectable 25Gram(s) IV Push once  glucagon  Injectable 1milliGRAM(s) IntraMuscular once PRN    sodium chloride 0.9%. 1000milliLiter(s) IV Continuous <Continuous>  aspirin  chewable 81milliGRAM(s) Oral daily  dextrose 5%. 1000milliLiter(s) IV Continuous <Continuous>      	    [PHYSICAL EXAM:  TELEMETRY:  T(C): 36.7, Max: 36.9 (02-16 @ 22:02)  HR: 77 (18 - 85)  BP: 148/84 (148/84 - 207/90)  RR: 18 (16 - 18)  SpO2: 96% (96% - 100%)  Wt(kg): --  I&O's Summary    I & Os for current day (as of 17 Feb 2017 11:24)  =============================================  IN: 0 ml / OUT: 600 ml / NET: -600 ml      Weight (kg): 104.3 (02-16 @ 13:04)  Carrillo:  Central/PICC/Mid Line:                                         Appearance: Normal	  HEENT:   Normal oral mucosa, PERRL, EOMI	  Neck: Supple, + JVD/ - JVD; Carotid Bruit   Cardiovascular: Normal S1 S2, No JVD, No murmurs,   Respiratory: Lungs clear to auscultation/Decreased Breath Sounds/No Rales, Rhonchi, Wheezing	  Gastrointestinal:  Soft, Non-tender, + BS	  Skin: No rashes, No ecchymoses, No cyanosis  Extremities: Normal range of motion, No clubbing, cyanosis or edema  Vascular: Peripheral pulses palpable 2+ bilaterally  Neurologic: Non-focal  Psychiatry: A & O x 3, Mood & affect appropriate      	    ECG:  	  RADIOLOGY:   DIAGNOSTIC TESTING:  [ ] Echocardiogram:  [ ]  Catheterization:  [ ] Stress Test:    [ ] DESEAN  OTHER: 	    LABS:	 	  CARDIAC MARKERS:  Troponin I, Serum: <0.015 ng/mL (02-16 @ 11:58)          Troponin I, Serum: <0.015 ng/mL (02-16 @ 11:58)                          14.6   7.8   )-----------( 203      ( 17 Feb 2017 07:13 )             42.7     17 Feb 2017 07:12    140    |  106    |  26     ----------------------------<  124    4.2     |  23     |  1.21     Ca    8.8        17 Feb 2017 07:12  Mg     2.3       17 Feb 2017 07:12    TPro  7.3    /  Alb  3.6    /  TBili  0.5    /  DBili  x      /  AST  34     /  ALT  48     /  AlkPhos  50     16 Feb 2017 11:58    proBNP:   Lipid Profile:   HgA1c: Hemoglobin A1C, Whole Blood: 7.6 % (02-17 @ 07:13)    TSH:   PT/INR - ( 16 Feb 2017 11:58 )   PT: 11.5 sec;   INR: 1.04          PTT - ( 16 Feb 2017 11:58 )  PTT:31.8 sec            DVT ppx: OOB as tolerated encouraged     Dispo: Awaiting CTS on Monday Interventional Cardiology PA Adult Progress Note    Subjective Assessment: Pt seen and examined at bedside. Denies any acute symptoms.   	  MEDICATIONS:  lisinopril 20milliGRAM(s) Oral daily            simvastatin 40milliGRAM(s) Oral at bedtime  insulin lispro (HumaLOG) corrective regimen sliding scale  SubCutaneous Before meals and at bedtime  dextrose Gel 1Dose(s) Oral once PRN  dextrose 50% Injectable 12.5Gram(s) IV Push once  dextrose 50% Injectable 25Gram(s) IV Push once  dextrose 50% Injectable 25Gram(s) IV Push once  glucagon  Injectable 1milliGRAM(s) IntraMuscular once PRN    sodium chloride 0.9%. 1000milliLiter(s) IV Continuous <Continuous>  aspirin  chewable 81milliGRAM(s) Oral daily  dextrose 5%. 1000milliLiter(s) IV Continuous <Continuous>      	    [PHYSICAL EXAM:  TELEMETRY:  T(C): 36.7, Max: 36.9 (02-16 @ 22:02)  HR: 77 (18 - 85)  BP: 148/84 (148/84 - 207/90)  RR: 18 (16 - 18)  SpO2: 96% (96% - 100%)  Wt(kg): --  I&O's Summary    I & Os for current day (as of 17 Feb 2017 11:24)  =============================================  IN: 0 ml / OUT: 600 ml / NET: -600 ml      Weight (kg): 104.3 (02-16 @ 13:04)      Appearance: Normal	  Neck: Supple, no JVD   Cardiovascular: RRR, S1 S2 no murmurs   Respiratory: Lungs clear to auscultation b/l 	  Gastrointestinal:  +BS soft NT/ ND   Extremities: Normal range of motion, No edema b/l   Right wrist: no hematoma, + ecchymosis, still oozing slight, reapplied a clean dressing, 2+ radial pulse   Neurologic: Non-focal  Psychiatry: A & O x 3, Mood & affect appropriate        LABS:	 	  CARDIAC MARKERS:  Troponin I, Serum: <0.015 ng/mL (02-16 @ 11:58)          Troponin I, Serum: <0.015 ng/mL (02-16 @ 11:58)                          14.6   7.8   )-----------( 203      ( 17 Feb 2017 07:13 )             42.7     17 Feb 2017 07:12    140    |  106    |  26     ----------------------------<  124    4.2     |  23     |  1.21     Ca    8.8        17 Feb 2017 07:12  Mg     2.3       17 Feb 2017 07:12    TPro  7.3    /  Alb  3.6    /  TBili  0.5    /  DBili  x      /  AST  34     /  ALT  48     /  AlkPhos  50     16 Feb 2017 11:58    proBNP:   Lipid Profile:   HgA1c: Hemoglobin A1C, Whole Blood: 7.6 % (02-17 @ 07:13)    TSH:   PT/INR - ( 16 Feb 2017 11:58 )   PT: 11.5 sec;   INR: 1.04          PTT - ( 16 Feb 2017 11:58 )  PTT:31.8 sec            DVT ppx: OOB as tolerated encouraged     Dispo: Awaiting CTS on Monday Interventional Cardiology PA Adult Progress Note    Subjective Assessment: Pt seen and examined at bedside. Denies any acute symptoms.   	  MEDICATIONS:  lisinopril 20milliGRAM(s) Oral daily            simvastatin 40milliGRAM(s) Oral at bedtime  insulin lispro (HumaLOG) corrective regimen sliding scale  SubCutaneous Before meals and at bedtime  dextrose Gel 1Dose(s) Oral once PRN  dextrose 50% Injectable 12.5Gram(s) IV Push once  dextrose 50% Injectable 25Gram(s) IV Push once  dextrose 50% Injectable 25Gram(s) IV Push once  glucagon  Injectable 1milliGRAM(s) IntraMuscular once PRN    sodium chloride 0.9%. 1000milliLiter(s) IV Continuous <Continuous>  aspirin  chewable 81milliGRAM(s) Oral daily  dextrose 5%. 1000milliLiter(s) IV Continuous <Continuous>      	    [PHYSICAL EXAM:  TELEMETRY:  T(C): 36.7, Max: 36.9 (02-16 @ 22:02)  HR: 77 (18 - 85)  BP: 148/84 (148/84 - 207/90)  RR: 18 (16 - 18)  SpO2: 96% (96% - 100%)  Wt(kg): --  I&O's Summary    I & Os for current day (as of 17 Feb 2017 11:24)  =============================================  IN: 0 ml / OUT: 600 ml / NET: -600 ml      Weight (kg): 104.3 (02-16 @ 13:04)      Appearance: Normal	  Neck: Supple, no JVD   Cardiovascular: RRR, S1 S2 no murmurs   Respiratory: Lungs clear to auscultation b/l 	  Gastrointestinal:  +BS soft NT/ ND   Extremities: Normal range of motion, No edema b/l   Right wrist: no hematoma, + ecchymosis, still oozing slightly, reapplied a clean dressing, 2+ radial pulse   Neurologic: Non-focal  Psychiatry: A & O x 3, Mood & affect appropriate        LABS:	 	  CARDIAC MARKERS:  Troponin I, Serum: <0.015 ng/mL (02-16 @ 11:58)          Troponin I, Serum: <0.015 ng/mL (02-16 @ 11:58)                          14.6   7.8   )-----------( 203      ( 17 Feb 2017 07:13 )             42.7     17 Feb 2017 07:12    140    |  106    |  26     ----------------------------<  124    4.2     |  23     |  1.21     Ca    8.8        17 Feb 2017 07:12  Mg     2.3       17 Feb 2017 07:12    TPro  7.3    /  Alb  3.6    /  TBili  0.5    /  DBili  x      /  AST  34     /  ALT  48     /  AlkPhos  50     16 Feb 2017 11:58    proBNP:   Lipid Profile:   HgA1c: Hemoglobin A1C, Whole Blood: 7.6 % (02-17 @ 07:13)    TSH:   PT/INR - ( 16 Feb 2017 11:58 )   PT: 11.5 sec;   INR: 1.04          PTT - ( 16 Feb 2017 11:58 )  PTT:31.8 sec            DVT ppx: OOB as tolerated encouraged     Dispo: Awaiting CTS on Monday

## 2017-02-18 LAB
ANION GAP SERPL CALC-SCNC: 9 MMOL/L — SIGNIFICANT CHANGE UP (ref 9–16)
BUN SERPL-MCNC: 33 MG/DL — HIGH (ref 7–23)
CALCIUM SERPL-MCNC: 8.4 MG/DL — LOW (ref 8.5–10.5)
CHLORIDE SERPL-SCNC: 108 MMOL/L — SIGNIFICANT CHANGE UP (ref 96–108)
CO2 SERPL-SCNC: 23 MMOL/L — SIGNIFICANT CHANGE UP (ref 22–31)
CREAT SERPL-MCNC: 1.37 MG/DL — HIGH (ref 0.5–1.3)
GLUCOSE SERPL-MCNC: 123 MG/DL — HIGH (ref 70–99)
HCT VFR BLD CALC: 40.5 % — SIGNIFICANT CHANGE UP (ref 39–50)
HGB BLD-MCNC: 13.3 G/DL — SIGNIFICANT CHANGE UP (ref 13–17)
MAGNESIUM SERPL-MCNC: 2.3 MG/DL — SIGNIFICANT CHANGE UP (ref 1.6–2.4)
MCHC RBC-ENTMCNC: 31.2 PG — SIGNIFICANT CHANGE UP (ref 27–34)
MCHC RBC-ENTMCNC: 32.8 G/DL — SIGNIFICANT CHANGE UP (ref 32–36)
MCV RBC AUTO: 95.1 FL — SIGNIFICANT CHANGE UP (ref 80–100)
PLATELET # BLD AUTO: 159 K/UL — SIGNIFICANT CHANGE UP (ref 150–400)
POTASSIUM SERPL-MCNC: 4.4 MMOL/L — SIGNIFICANT CHANGE UP (ref 3.5–5.3)
POTASSIUM SERPL-SCNC: 4.4 MMOL/L — SIGNIFICANT CHANGE UP (ref 3.5–5.3)
RBC # BLD: 4.26 M/UL — SIGNIFICANT CHANGE UP (ref 4.2–5.8)
RBC # FLD: 14.7 % — SIGNIFICANT CHANGE UP (ref 10.3–16.9)
SODIUM SERPL-SCNC: 140 MMOL/L — SIGNIFICANT CHANGE UP (ref 135–145)
WBC # BLD: 6.5 K/UL — SIGNIFICANT CHANGE UP (ref 3.8–10.5)
WBC # FLD AUTO: 6.5 K/UL — SIGNIFICANT CHANGE UP (ref 3.8–10.5)

## 2017-02-18 RX ORDER — METOPROLOL TARTRATE 50 MG
25 TABLET ORAL
Qty: 0 | Refills: 0 | Status: DISCONTINUED | OUTPATIENT
Start: 2017-02-18 | End: 2017-02-20

## 2017-02-18 RX ORDER — LISINOPRIL 2.5 MG/1
5 TABLET ORAL DAILY
Qty: 0 | Refills: 0 | Status: DISCONTINUED | OUTPATIENT
Start: 2017-02-18 | End: 2017-02-18

## 2017-02-18 RX ORDER — SODIUM CHLORIDE 9 MG/ML
1000 INJECTION INTRAMUSCULAR; INTRAVENOUS; SUBCUTANEOUS
Qty: 0 | Refills: 0 | Status: DISCONTINUED | OUTPATIENT
Start: 2017-02-18 | End: 2017-02-20

## 2017-02-18 RX ADMIN — Medication 1 APPLICATION(S): at 18:04

## 2017-02-18 RX ADMIN — SODIUM CHLORIDE 500 MILLILITER(S): 9 INJECTION INTRAMUSCULAR; INTRAVENOUS; SUBCUTANEOUS at 08:40

## 2017-02-18 RX ADMIN — Medication 1 APPLICATION(S): at 07:35

## 2017-02-18 RX ADMIN — LISINOPRIL 10 MILLIGRAM(S): 2.5 TABLET ORAL at 07:41

## 2017-02-18 RX ADMIN — Medication 81 MILLIGRAM(S): at 11:44

## 2017-02-18 RX ADMIN — Medication 1: at 11:44

## 2017-02-18 RX ADMIN — SIMVASTATIN 40 MILLIGRAM(S): 20 TABLET, FILM COATED ORAL at 20:56

## 2017-02-18 RX ADMIN — Medication 25 MILLIGRAM(S): at 18:04

## 2017-02-18 RX ADMIN — Medication 12.5 MILLIGRAM(S): at 07:36

## 2017-02-18 NOTE — PROGRESS NOTE ADULT - PROBLEM SELECTOR PLAN 5
Stage II CKD   Cr 1.3. Will con't to monitor Stage III CKD   Cr 1.3. Gave 1 bolus of NS. Will con't to monitor.

## 2017-02-18 NOTE — PROGRESS NOTE ADULT - SUBJECTIVE AND OBJECTIVE BOX
INTERVAL HISTORY:  	  No chest pain, dyspnea.    MEDICATIONS:  metoprolol 12.5milliGRAM(s) Oral <User Schedule>  lisinopril 10milliGRAM(s) Oral daily  simvastatin 40milliGRAM(s) Oral at bedtime  insulin lispro (HumaLOG) corrective regimen sliding scale  SubCutaneous Before meals and at bedtime  dextrose Gel 1Dose(s) Oral once PRN  dextrose 50% Injectable 12.5Gram(s) IV Push once  dextrose 50% Injectable 25Gram(s) IV Push once  dextrose 50% Injectable 25Gram(s) IV Push once  glucagon  Injectable 1milliGRAM(s) IntraMuscular once PRN    aspirin  chewable 81milliGRAM(s) Oral daily  dextrose 5%. 1000milliLiter(s) IV Continuous <Continuous>  BACItracin   Ointment 1Application(s) Topical two times a day  sodium chloride 0.9%. 1000milliLiter(s) IV Continuous <Continuous>      Allergies    No Known Allergies    Intolerances          PHYSICAL EXAM:  T(C): 36.2, Max: 37.1 (02-17 @ 20:42)  HR: 81 (64 - 81)  BP: 148/81 (100/57 - 148/81)  RR: 16 (16 - 18)  SpO2: --  Wt(kg): --  I&O's Summary    I & Os for current day (as of 18 Feb 2017 09:56)  =============================================  IN: 0 ml / OUT: 600 ml / NET: -600 ml    Height (cm): 172.7 (02-17 @ 13:11)    Appearance: Normal	  Cardiovascular: Normal S1 S2, No murmurs, rubs or gallops  Respiratory: Lungs clear to auscultation	  Psychiatry: A & O x 3, Mood & affect appropriate  Gastrointestinal:  Soft, NT/ND, + BS, No HSM. No masses	  Skin: No rashes, No ecchymoses  Neurologic: Non-focal  Extremities:No clubbing, cyanosis or edema      TELEMETRY: 	    ECG:  	  RADIOLOGY:   DIAGNOSTIC TESTING:  [ ] Echocardiogram:  [ ]  Catheterization:  [ ] Stress Test:    OTHER: 	    LABS:	 	    CARDIAC MARKERS:                                  13.3   6.5   )-----------( 159      ( 18 Feb 2017 07:05 )             40.5     18 Feb 2017 07:04    140    |  108    |  33     ----------------------------<  123    4.4     |  23     |  1.37     Ca    8.4        18 Feb 2017 07:04  Mg     2.3       18 Feb 2017 07:04    TPro  7.3    /  Alb  3.6    /  TBili  0.5    /  DBili  x      /  AST  34     /  ALT  48     /  AlkPhos  50     16 Feb 2017 11:58    proBNP: Serum Pro-Brain Natriuretic Peptide: 139 pg/mL (02-17 @ 13:05)    Lipid Profile:   HgA1c:   TSH: Thyroid Stimulating Hormone, Serum: 2.021 uIU/mL (02-17 @ 13:05)    PT/INR - ( 16 Feb 2017 11:58 )   PT: 11.5 sec;   INR: 1.04          PTT - ( 16 Feb 2017 11:58 )  PTT:31.8 sec  ASSESSMENT/PLAN: 	  ASHD- Severe 3VD, for CABG monday  HTN- under good control  Hyoerlipidemia- statin

## 2017-02-18 NOTE — PROGRESS NOTE ADULT - ASSESSMENT
65 y/o M PMH HTN, hyperlipidemia, DM2, CAD s/p [facility]'s [facility] (05/24/2005 PCI to mLAD x2, pLAD and 04/18/2005 PCI to mid/prox/distal RCA, OM2), renal insufficiency (per pt), who had an abnl outpatient NST, s/p dx cardiac cath revealing 3VD. Now awaiting CTS on Monday. 67 y/o M PMH HTN, hyperlipidemia, DM2, CAD s/p multiple PCI's @[facility] (05/24/2005 PCI to mLAD x2, pLAD and 04/18/2005 PCI to mid/prox/distal RCA, OM2), renal insufficiency, who had an abnl outpatient NST, s/p dx cardiac cath revealing 3VD. Now awaiting CTS on Monday.

## 2017-02-18 NOTE — PROGRESS NOTE ADULT - SUBJECTIVE AND OBJECTIVE BOX
Interventional Cardiology PA Adult Progress Note    Subjective Assessment: Pt seen and examined at bedside. Denies any acute symptoms. Pt states he feels well.   	  MEDICATIONS:  metoprolol 12.5milliGRAM(s) Oral <User Schedule>  lisinopril 10milliGRAM(s) Oral daily            simvastatin 40milliGRAM(s) Oral at bedtime  insulin lispro (HumaLOG) corrective regimen sliding scale  SubCutaneous Before meals and at bedtime  dextrose Gel 1Dose(s) Oral once PRN  dextrose 50% Injectable 12.5Gram(s) IV Push once  dextrose 50% Injectable 25Gram(s) IV Push once  dextrose 50% Injectable 25Gram(s) IV Push once  glucagon  Injectable 1milliGRAM(s) IntraMuscular once PRN    aspirin  chewable 81milliGRAM(s) Oral daily  dextrose 5%. 1000milliLiter(s) IV Continuous <Continuous>  BACItracin   Ointment 1Application(s) Topical two times a day  sodium chloride 0.9%. 1000milliLiter(s) IV Continuous <Continuous>      	    [PHYSICAL EXAM:  TELEMETRY:  T(C): 36.2, Max: 37.1 (02-17 @ 20:42)  HR: 64 (64 - 81)  BP: 131/62 (100/57 - 148/81)  RR: 18 (16 - 18)  SpO2: --  Wt(kg): --  I&O's Summary    I & Os for current day (as of 18 Feb 2017 12:44)  =============================================  IN: 0 ml / OUT: 600 ml / NET: -600 ml    Height (cm): 172.7 (02-17 @ 13:11)  Carrillo:  Central/PICC/Mid Line:                                         Appearance: Normal	  HEENT:   Normal oral mucosa, PERRL, EOMI	  Neck: Supple, + JVD/ - JVD; Carotid Bruit   Cardiovascular: Normal S1 S2, No JVD, No murmurs,   Respiratory: Lungs clear to auscultation/Decreased Breath Sounds/No Rales, Rhonchi, Wheezing	  Gastrointestinal:  Soft, Non-tender, + BS	  Skin: No rashes, No ecchymoses, No cyanosis  Extremities: Normal range of motion, No clubbing, cyanosis or edema  Vascular: Peripheral pulses palpable 2+ bilaterally  Neurologic: Non-focal  Psychiatry: A & O x 3, Mood & affect appropriate        LABS:	 	                        13.3   6.5   )-----------( 159      ( 18 Feb 2017 07:05 )             40.5     18 Feb 2017 07:04    140    |  108    |  33     ----------------------------<  123    4.4     |  23     |  1.37     Ca    8.4        18 Feb 2017 07:04  Mg     2.3       18 Feb 2017 07:04      proBNP: Serum Pro-Brain Natriuretic Peptide: 139 pg/mL (02-17 @ 13:05)    Lipid Profile:   HgA1c:   TSH: Thyroid Stimulating Hormone, Serum: 2.021 uIU/mL (02-17 @ 13:05)        ASSESSMENT/PLAN: 	        DVT ppx:  Dispo: Interventional Cardiology PA Adult Progress Note    Subjective Assessment: Pt seen and examined at bedside. Denies any acute symptoms. Pt states he feels well.   	  MEDICATIONS:  metoprolol 12.5milliGRAM(s) Oral <User Schedule>  lisinopril 10milliGRAM(s) Oral daily            simvastatin 40milliGRAM(s) Oral at bedtime  insulin lispro (HumaLOG) corrective regimen sliding scale  SubCutaneous Before meals and at bedtime  dextrose Gel 1Dose(s) Oral once PRN  dextrose 50% Injectable 12.5Gram(s) IV Push once  dextrose 50% Injectable 25Gram(s) IV Push once  dextrose 50% Injectable 25Gram(s) IV Push once  glucagon  Injectable 1milliGRAM(s) IntraMuscular once PRN    aspirin  chewable 81milliGRAM(s) Oral daily  dextrose 5%. 1000milliLiter(s) IV Continuous <Continuous>  BACItracin   Ointment 1Application(s) Topical two times a day  sodium chloride 0.9%. 1000milliLiter(s) IV Continuous <Continuous>      	    [PHYSICAL EXAM:  TELEMETRY:  T(C): 36.2, Max: 37.1 (02-17 @ 20:42)  HR: 64 (64 - 81)  BP: 131/62 (100/57 - 148/81)  RR: 18 (16 - 18)  SpO2: --  Wt(kg): --  I&O's Summary    I & Os for current day (as of 18 Feb 2017 12:44)  =============================================  IN: 0 ml / OUT: 600 ml / NET: -600 ml    Height (cm): 172.7 (02-17 @ 13:11)  Carrillo:  Central/PICC/Mid Line:                                         Appearance: Normal	  HEENT:   Normal oral mucosa, PERRL, EOMI	  Neck: Supple, + JVD/ - JVD; Carotid Bruit   Cardiovascular: Normal S1 S2, No JVD, No murmurs,   Respiratory: Lungs clear to auscultation/Decreased Breath Sounds/No Rales, Rhonchi, Wheezing	  Gastrointestinal:  Soft, Non-tender, + BS	  Skin: No rashes, No ecchymoses, No cyanosis  Extremities: Normal range of motion, No clubbing, cyanosis or edema  Vascular: Peripheral pulses palpable 2+ bilaterally  Neurologic: Non-focal  Psychiatry: A & O x 3, Mood & affect appropriate        LABS:	 	                        13.3   6.5   )-----------( 159      ( 18 Feb 2017 07:05 )             40.5     18 Feb 2017 07:04    140    |  108    |  33     ----------------------------<  123    4.4     |  23     |  1.37     Ca    8.4        18 Feb 2017 07:04  Mg     2.3       18 Feb 2017 07:04      proBNP: Serum Pro-Brain Natriuretic Peptide: 139 pg/mL (02-17 @ 13:05)      TSH: Thyroid Stimulating Hormone, Serum: 2.021 uIU/mL (02-17 @ 13:05)                DVT ppx: OOB as tolerated encouraged.     Dispo: Awaiting CTS on Monday 2/20.

## 2017-02-18 NOTE — PROGRESS NOTE ADULT - SUBJECTIVE AND OBJECTIVE BOX
Patient discussed on morning rounds with Dr. Osorio      SUBJECTIVE ASSESSMENT:  Patient seen this morning at bedside, patient doing well and not offering any complaints at this time. Patient denies any current chest pain/pressure, shortness of breath or palpitations     Vital Signs Last 24 Hrs  T(C): 36.9, Max: 37.1 ( @ 20:42)  T(F): 98.4, Max: 98.7 ( @ 20:42)  HR: 64 (64 - 81)  BP: 131/62 (100/57 - 148/81)  BP(mean): --  RR: 18 (16 - 18)  SpO2: --  I&O's Detail  I & Os for 24h ending 2017 07:00  =============================================  IN:    Total IN: 0 ml  ---------------------------------------------  OUT:    Voided: 600 ml    Total OUT: 600 ml  ---------------------------------------------  Total NET: -600 ml    I & Os for current day (as of 2017 18:09)  =============================================  IN:    Oral Fluid: 320 ml    Total IN: 320 ml  ---------------------------------------------  OUT:    Total OUT: 0 ml  ---------------------------------------------  Total NET: 320 ml      PHYSICAL EXAM:    General: Patient sitting comfortably in chair, no acute distress     Neurological: Alert and oriented, no focal neurological deficits     Cardiovascular: S1S2, RRR, no murmurs appreciated on exam     Respiratory: Clear to ausculation bilaterally     Gastrointestinal: Abdomen soft, non tender, non distended     Extremities: Warm and well perfused . no edema or calf tenderness     Vascular: Peripheral pulses 2+ bilaterally       LABS:                        13.3   6.5   )-----------( 159      ( 2017 07:05 )             40.5     2017 07:04    140    |  108    |  33     ----------------------------<  123    4.4     |  23     |  1.37     Ca    8.4        2017 07:04  Mg     2.3       2017 07:04        Urinalysis Basic - ( 2017 11:28 )    Color: Yellow / Appearance: Clear / S.020 / pH: x  Gluc: x / Ketone: NEGATIVE  / Bili: NEGATIVE / Urobili: 0.2 E.U./dL   Blood: x / Protein: NEGATIVE mg/dL / Nitrite: NEGATIVE   Leuk Esterase: NEGATIVE / RBC: < 5 /HPF / WBC < 5 /HPF   Sq Epi: x / Non Sq Epi: Rare /HPF / Bacteria: Present /HPF      MEDICATIONS  (STANDING):  aspirin  chewable 81milliGRAM(s) Oral daily  simvastatin 40milliGRAM(s) Oral at bedtime  insulin lispro (HumaLOG) corrective regimen sliding scale  SubCutaneous Before meals and at bedtime  BACItracin   Ointment 1Application(s) Topical two times a day  sodium chloride 0.9%. 1000milliLiter(s) IV Continuous <Continuous>  metoprolol 25milliGRAM(s) Oral two times a day    MEDICATIONS  (PRN):  dextrose Gel 1Dose(s) Oral once PRN Blood Glucose LESS THAN 70 milliGRAM(s)/deciliter  glucagon  Injectable 1milliGRAM(s) IntraMuscular once PRN Glucose LESS THAN 70 milligrams/deciliter    A/P: 66y Male w/ pmhx HTN, HLD, DM2, CAD s/p multiple PCI's at [facility] (05 PCI to mLADx2, pLAD and 05 PCI to mid/prox/distal RCA, OM2), renal insufficiency was sent to [facility] ED on  in the setting of an abnormal nuclear stress test revealing moderately abnormal myocardial perfusion images consistent with stress-induced ischemia in the inferior wall. Pt admitted to cardiology on  for cardiac catherization that revealed 3VCAD LM normal, midLAD 85%, proxD1 80%, midLCx 80%, proxRCA 100% with left to right collaterals, EF 55%, EDP 17. Dr. Mckeon consulted for surgical evaluation, OR on Monday for CABG.   - Case discussed with Dr. Mckeon, patient to go to OR on Monday for CABG   - Please obtain bedside PFTs, ABG and another type and screen prior to OR monday   - Discontinue lisinopril today, please titrate beta blocker up as long as BP/HR can tolerate   - Patient discussed on morning rounds with Dr. Osorio      SUBJECTIVE ASSESSMENT:  Patient seen this morning at bedside, patient doing well and not offering any complaints at this time. Patient denies any current chest pain/pressure, shortness of breath or palpitations     Vital Signs Last 24 Hrs  T(C): 36.9, Max: 37.1 ( @ 20:42)  T(F): 98.4, Max: 98.7 ( @ 20:42)  HR: 64 (64 - 81)  BP: 131/62 (100/57 - 148/81)  BP(mean): --  RR: 18 (16 - 18)  SpO2: --  I&O's Detail  I & Os for 24h ending 2017 07:00  =============================================  IN:    Total IN: 0 ml  ---------------------------------------------  OUT:    Voided: 600 ml    Total OUT: 600 ml  ---------------------------------------------  Total NET: -600 ml    I & Os for current day (as of 2017 18:09)  =============================================  IN:    Oral Fluid: 320 ml    Total IN: 320 ml  ---------------------------------------------  OUT:    Total OUT: 0 ml  ---------------------------------------------  Total NET: 320 ml      PHYSICAL EXAM:    General: Patient sitting comfortably in chair, no acute distress     Neurological: Alert and oriented, no focal neurological deficits     Cardiovascular: S1S2, RRR, no murmurs appreciated on exam     Respiratory: Clear to ausculation bilaterally     Gastrointestinal: Abdomen soft, non tender, non distended     Extremities: Warm and well perfused . no edema or calf tenderness     Vascular: Peripheral pulses 2+ bilaterally       LABS:                        13.3   6.5   )-----------( 159      ( 2017 07:05 )             40.5     2017 07:04    140    |  108    |  33     ----------------------------<  123    4.4     |  23     |  1.37     Ca    8.4        2017 07:04  Mg     2.3       2017 07:04        Urinalysis Basic - ( 2017 11:28 )    Color: Yellow / Appearance: Clear / S.020 / pH: x  Gluc: x / Ketone: NEGATIVE  / Bili: NEGATIVE / Urobili: 0.2 E.U./dL   Blood: x / Protein: NEGATIVE mg/dL / Nitrite: NEGATIVE   Leuk Esterase: NEGATIVE / RBC: < 5 /HPF / WBC < 5 /HPF   Sq Epi: x / Non Sq Epi: Rare /HPF / Bacteria: Present /HPF      MEDICATIONS  (STANDING):  aspirin  chewable 81milliGRAM(s) Oral daily  simvastatin 40milliGRAM(s) Oral at bedtime  insulin lispro (HumaLOG) corrective regimen sliding scale  SubCutaneous Before meals and at bedtime  BACItracin   Ointment 1Application(s) Topical two times a day  sodium chloride 0.9%. 1000milliLiter(s) IV Continuous <Continuous>  metoprolol 25milliGRAM(s) Oral two times a day    MEDICATIONS  (PRN):  dextrose Gel 1Dose(s) Oral once PRN Blood Glucose LESS THAN 70 milliGRAM(s)/deciliter  glucagon  Injectable 1milliGRAM(s) IntraMuscular once PRN Glucose LESS THAN 70 milligrams/deciliter    A/P: 66y Male w/ pmhx HTN, HLD, DM2, CAD s/p multiple PCI's at [facility] (05 PCI to mLADx2, pLAD and 05 PCI to mid/prox/distal RCA, OM2), renal insufficiency was sent to [facility] ED on  in the setting of an abnormal nuclear stress test revealing moderately abnormal myocardial perfusion images consistent with stress-induced ischemia in the inferior wall. Pt admitted to cardiology on  for cardiac catherization that revealed 3VCAD LM normal, midLAD 85%, proxD1 80%, midLCx 80%, proxRCA 100% with left to right collaterals, EF 55%, EDP 17. Dr. Mckeon consulted for surgical evaluation, OR on Monday for CABG.   - Case discussed with Dr. Mckeon, patient to go to OR on Monday for CABG   - Please obtain bedside PFTs, ABG and another type and screen prior to OR monday   - Discontinue lisinopril today, please titrate beta blocker up as long as BP/HR can tolerate   - Continue medical management as per primary team   - Consent not obtained today due to Shabbat, will obtain tomorrow

## 2017-02-18 NOTE — PROGRESS NOTE ADULT - PROBLEM SELECTOR PLAN 1
s/p diagnostic cath on 2/16 revealing 85% mLAD, 80% pD1, 80%  mLCx, 100% pRCA w/ left to right collaterals, EF 55%, EDP 17. Con't Aspirin EC 81mg daily and Simvastatin 40mg QHS.  CTS consulted (Dr Mckeon)   Surgery planned for Monday 2/20.  ECHO revealed EF 60-65%, moderate Aortic valve thickening, Trace AR.   Carotid US revealed no stenosis.

## 2017-02-19 LAB
ANION GAP SERPL CALC-SCNC: 9 MMOL/L — SIGNIFICANT CHANGE UP (ref 9–16)
BASE EXCESS BLDA CALC-SCNC: -0.8 MMOL/L — SIGNIFICANT CHANGE UP (ref -2–3)
BLD GP AB SCN SERPL QL: NEGATIVE — SIGNIFICANT CHANGE UP
BUN SERPL-MCNC: 31 MG/DL — HIGH (ref 7–23)
CALCIUM SERPL-MCNC: 8.3 MG/DL — LOW (ref 8.5–10.5)
CHLORIDE SERPL-SCNC: 107 MMOL/L — SIGNIFICANT CHANGE UP (ref 96–108)
CO2 SERPL-SCNC: 25 MMOL/L — SIGNIFICANT CHANGE UP (ref 22–31)
CREAT SERPL-MCNC: 1.29 MG/DL — SIGNIFICANT CHANGE UP (ref 0.5–1.3)
GAS PNL BLDA: SIGNIFICANT CHANGE UP
GLUCOSE SERPL-MCNC: 131 MG/DL — HIGH (ref 70–99)
HCO3 BLDA-SCNC: 23 MMOL/L — SIGNIFICANT CHANGE UP (ref 21–28)
HCT VFR BLD CALC: 40.6 % — SIGNIFICANT CHANGE UP (ref 39–50)
HGB BLD-MCNC: 13.5 G/DL — SIGNIFICANT CHANGE UP (ref 13–17)
MAGNESIUM SERPL-MCNC: 2.2 MG/DL — SIGNIFICANT CHANGE UP (ref 1.6–2.4)
MCHC RBC-ENTMCNC: 31.3 PG — SIGNIFICANT CHANGE UP (ref 27–34)
MCHC RBC-ENTMCNC: 33.3 G/DL — SIGNIFICANT CHANGE UP (ref 32–36)
MCV RBC AUTO: 94 FL — SIGNIFICANT CHANGE UP (ref 80–100)
PCO2 BLDA: 36 MMHG — SIGNIFICANT CHANGE UP (ref 35–48)
PH BLDA: 7.43 — SIGNIFICANT CHANGE UP (ref 7.35–7.45)
PLATELET # BLD AUTO: 164 K/UL — SIGNIFICANT CHANGE UP (ref 150–400)
PO2 BLDA: 79 MMHG — LOW (ref 83–108)
POTASSIUM SERPL-MCNC: 4.4 MMOL/L — SIGNIFICANT CHANGE UP (ref 3.5–5.3)
POTASSIUM SERPL-SCNC: 4.4 MMOL/L — SIGNIFICANT CHANGE UP (ref 3.5–5.3)
RBC # BLD: 4.32 M/UL — SIGNIFICANT CHANGE UP (ref 4.2–5.8)
RBC # FLD: 14.2 % — SIGNIFICANT CHANGE UP (ref 10.3–16.9)
RH IG SCN BLD-IMP: POSITIVE — SIGNIFICANT CHANGE UP
SAO2 % BLDA: 96 % — SIGNIFICANT CHANGE UP (ref 95–100)
SODIUM SERPL-SCNC: 141 MMOL/L — SIGNIFICANT CHANGE UP (ref 135–145)
WBC # BLD: 7.2 K/UL — SIGNIFICANT CHANGE UP (ref 3.8–10.5)
WBC # FLD AUTO: 7.2 K/UL — SIGNIFICANT CHANGE UP (ref 3.8–10.5)

## 2017-02-19 PROCEDURE — 93010 ELECTROCARDIOGRAM REPORT: CPT

## 2017-02-19 RX ORDER — CHLORHEXIDINE GLUCONATE 213 G/1000ML
1 SOLUTION TOPICAL ONCE
Qty: 0 | Refills: 0 | Status: COMPLETED | OUTPATIENT
Start: 2017-02-19 | End: 2017-02-19

## 2017-02-19 RX ORDER — CHLORHEXIDINE GLUCONATE 213 G/1000ML
5 SOLUTION TOPICAL ONCE
Qty: 0 | Refills: 0 | Status: DISCONTINUED | OUTPATIENT
Start: 2017-02-19 | End: 2017-02-20

## 2017-02-19 RX ADMIN — Medication 1 APPLICATION(S): at 17:54

## 2017-02-19 RX ADMIN — Medication 25 MILLIGRAM(S): at 06:43

## 2017-02-19 RX ADMIN — Medication 1 APPLICATION(S): at 06:43

## 2017-02-19 RX ADMIN — CHLORHEXIDINE GLUCONATE 1 APPLICATION(S): 213 SOLUTION TOPICAL at 22:30

## 2017-02-19 RX ADMIN — Medication 1: at 11:53

## 2017-02-19 RX ADMIN — Medication 81 MILLIGRAM(S): at 11:54

## 2017-02-19 RX ADMIN — SIMVASTATIN 40 MILLIGRAM(S): 20 TABLET, FILM COATED ORAL at 23:22

## 2017-02-19 RX ADMIN — Medication 25 MILLIGRAM(S): at 17:53

## 2017-02-19 NOTE — PROGRESS NOTE ADULT - SUBJECTIVE AND OBJECTIVE BOX
INTERVAL HISTORY:  	No complaints. Ambulating      MEDICATIONS:  metoprolol 25milliGRAM(s) Oral two times a day            simvastatin 40milliGRAM(s) Oral at bedtime  insulin lispro (HumaLOG) corrective regimen sliding scale  SubCutaneous Before meals and at bedtime  dextrose Gel 1Dose(s) Oral once PRN  dextrose 50% Injectable 12.5Gram(s) IV Push once  dextrose 50% Injectable 25Gram(s) IV Push once  dextrose 50% Injectable 25Gram(s) IV Push once  glucagon  Injectable 1milliGRAM(s) IntraMuscular once PRN    aspirin  chewable 81milliGRAM(s) Oral daily  dextrose 5%. 1000milliLiter(s) IV Continuous <Continuous>  BACItracin   Ointment 1Application(s) Topical two times a day  sodium chloride 0.9%. 1000milliLiter(s) IV Continuous <Continuous>      Allergies    No Known Allergies    Intolerances        Review of Systems  General: No fever, weight change, malaise, decreased appetite,cough   Cardiovascular: No chest pain , dyspnea, palpitations, lightheadedness, dizziness, edema  Gastrointestinal: No nausea, vomiting, diarrhea, constipation, abdominal pain  Urology: no dysuria, heamturia, urgency  Other: No rash or joint pain  Neurology: No headache, neck stiffness, back pain, paresthesia or paresis     PHYSICAL EXAM:  T(C): 36.8, Max: 37 (02-19 @ 05:31)  HR: 63 (53 - 73)  BP: 127/87 (110/56 - 151/70)  RR: 18 (16 - 18)  SpO2: 95% (95% - 95%)  Wt(kg): --  I&O's Summary    I & Os for current day (as of 19 Feb 2017 10:08)  =============================================  IN: 800 ml / OUT: 0 ml / NET: 800 ml        Appearance: Normal	  HEENT:   Normal oral mucosa, PERRL, EOMI  Neck: FROM supple, no JVD, bruits or thyromegaly	  Lymphatic: No lymphadenopathy  Cardiovascular: Normal S1 S2, No murmurs, rubs or gallops  Respiratory: Lungs clear to auscultation	  Psychiatry: A & O x 3, Mood & affect appropriate  Gastrointestinal:  Soft, NT/ND, + BS, No HSM. No masses	  Skin: No rashes, No ecchymoses  Neurologic: Non-focal  Extremities:No clubbing, cyanosis or edema  Vascular: Peripheral pulses palpable 2+ bilaterally    TELEMETRY: 	    ECG:  	  RADIOLOGY:   DIAGNOSTIC TESTING:  [ ] Echocardiogram:  [ ]  Catheterization:  [ ] Stress Test:    OTHER: 	    LABS:	 	    CARDIAC MARKERS:                                  13.5   7.2   )-----------( 164      ( 19 Feb 2017 06:25 )             40.6     19 Feb 2017 06:22    141    |  107    |  31     ----------------------------<  131    4.4     |  25     |  1.29     Ca    8.3        19 Feb 2017 06:22  Mg     2.2       19 Feb 2017 06:22      proBNP:   Lipid Profile:   HgA1c:   TSH:     ASSESSMENT/PLAN: 	  Stable for CABG

## 2017-02-19 NOTE — PROGRESS NOTE ADULT - PROBLEM SELECTOR PLAN 1
-s/p diagnostic cath on 2/16 revealing 85% mLAD, 80% pD1, 80%  mLCx, 100% pRCA w/ left to right collaterals, EF 55%, EDP 17. Con't Aspirin EC 81mg daily and Simvastatin 40mg QHS.  CTS consulted (Dr Mckeon) NPO after MN for CTS Monday 2/20/17  ECHO revealed EF 60-65%, moderate Aortic valve thickening, Trace AR.   Carotid US revealed no stenosis.

## 2017-02-19 NOTE — PROGRESS NOTE ADULT - SUBJECTIVE AND OBJECTIVE BOX
Interventional Cardiology PA Adult Progress Note    Subjective Assessment: Pt seen this AM in NAD reports he feels well no CP, SOB, palpitations, LE edema.  	  MEDICATIONS:  metoprolol 25milliGRAM(s) Oral two times a day            simvastatin 40milliGRAM(s) Oral at bedtime  insulin lispro (HumaLOG) corrective regimen sliding scale  SubCutaneous Before meals and at bedtime  dextrose Gel 1Dose(s) Oral once PRN  dextrose 50% Injectable 12.5Gram(s) IV Push once  dextrose 50% Injectable 25Gram(s) IV Push once  dextrose 50% Injectable 25Gram(s) IV Push once  glucagon  Injectable 1milliGRAM(s) IntraMuscular once PRN    aspirin  chewable 81milliGRAM(s) Oral daily  dextrose 5%. 1000milliLiter(s) IV Continuous <Continuous>  BACItracin   Ointment 1Application(s) Topical two times a day  sodium chloride 0.9%. 1000milliLiter(s) IV Continuous <Continuous>  chlorhexidine 4% Liquid 1Application(s) Topical once  chlorhexidine 0.12% Liquid 5milliLiter(s) Swish and Spit once      	    [PHYSICAL EXAM:  TELEMETRY: SR 70s no acute events o/n  T(C): 36.6, Max: 37 (02-19 @ 05:31)  HR: 62 (53 - 73)  BP: 152/71 (110/56 - 152/71)  RR: 18 (16 - 18)  SpO2: 95% (95% - 95%)  Wt(kg): --  I&O's Summary  I & Os for 24h ending 19 Feb 2017 07:00  =============================================  IN: 800 ml / OUT: 0 ml / NET: 800 ml    I & Os for current day (as of 19 Feb 2017 14:45)  =============================================  IN: 480 ml / OUT: 0 ml / NET: 480 ml      [place]:  Central/PICC/Mid Line:                                         Appearance: Normal	  HEENT:   Normal oral mucosa, PERRL, EOMI	  Neck: Supple, - JVD  Cardiovascular: Normal S1 S2, No JVD, No murmurs,   Respiratory: Lungs clear to auscultation  Gastrointestinal:  Soft, Non-tender, + BS	  Skin: No rashes, No ecchymoses, No cyanosis  Extremities: Normal range of motion, No clubbing, cyanosis or edema  Vascular: Peripheral pulses palpable 2+ bilaterally  Neurologic: Non-focal  Psychiatry: A & O x 3, Mood & affect appropriate      	    ECG:  	  RADIOLOGY:   DIAGNOSTIC TESTING:  [ ] Echocardiogram:  [ ]  Catheterization:  [ ] Stress Test:    [ ] DESEAN  OTHER: 	    LABS:	 	  CARDIAC MARKERS:                            13.5   7.2   )-----------( 164      ( 19 Feb 2017 06:25 )             40.6     19 Feb 2017 06:22    141    |  107    |  31     ----------------------------<  131    4.4     |  25     |  1.29     Ca    8.3        19 Feb 2017 06:22  Mg     2.2       19 Feb 2017 06:22          Dispo: NPO after MN for CTS with Dr. Mckeon tomorrow 2/20/17.

## 2017-02-19 NOTE — PROGRESS NOTE ADULT - ASSESSMENT
65 y/o M PMH HTN, hyperlipidemia, DM2, CAD s/p multiple PCI's @[facility] (05/24/2005 PCI to mLAD x2, pLAD and 04/18/2005 PCI to mid/prox/distal RCA, OM2), renal insufficiency, who had an abnl outpatient NST, s/p dx cardiac cath revealing 3VD. Now awaiting CTS on Monday.

## 2017-02-19 NOTE — PROGRESS NOTE ADULT - SUBJECTIVE AND OBJECTIVE BOX
Planned Date of Surgery: 2/20/17                                                                                                             Surgeon: Dr. Mckeon    Procedure: CAB    HPI:  6 66y Male w/ pmhx HTN, HLD, DM2, CAD s/p multiple PCI's at [facility] (5/24/05 PCI to mLADx2, pLAD and 4/18/05 PCI to mid/prox/distal RCA, OM2), renal insufficiency was sent to [facility] ED on 2/16 in the setting of an abnormal nuclear stress test revealing moderately abnormal myocardial perfusion images consistent with stress-induced ischemia in the inferior wall. Pt admitted to cardiology on 2/16 for cardiac catherization that revealed 3VCAD LM normal, midLAD 85%, proxD1 80%, midLCx 80%, proxRCA 100% with left to right collaterals, EF 55%, EDP 17. Dr. Mckeon consulted for surgical evaluation, OR on Monday for CABG.         PAST MEDICAL & SURGICAL HISTORY:  HTN (hypertension)  HLD (hyperlipidemia)  DM type 2 (diabetes mellitus, type 2)  H/O heart artery stent      No Known Allergies      MEDICATIONS  (STANDING):  aspirin  chewable 81milliGRAM(s) Oral daily  simvastatin 40milliGRAM(s) Oral at bedtime  insulin lispro (HumaLOG) corrective regimen sliding scale  SubCutaneous Before meals and at bedtime  dextrose 5%. 1000milliLiter(s) IV Continuous <Continuous>  dextrose 50% Injectable 12.5Gram(s) IV Push once  dextrose 50% Injectable 25Gram(s) IV Push once  dextrose 50% Injectable 25Gram(s) IV Push once  BACItracin   Ointment 1Application(s) Topical two times a day  sodium chloride 0.9%. 1000milliLiter(s) IV Continuous <Continuous>  metoprolol 25milliGRAM(s) Oral two times a day  chlorhexidine 4% Liquid 1Application(s) Topical once  chlorhexidine 0.12% Liquid 5milliLiter(s) Swish and Spit once    MEDICATIONS  (PRN):  dextrose Gel 1Dose(s) Oral once PRN Blood Glucose LESS THAN 70 milliGRAM(s)/deciliter  glucagon  Injectable 1milliGRAM(s) IntraMuscular once PRN Glucose LESS THAN 70 milligrams/deciliter      On Beta Blocker? YES     Labs:                        13.5   7.2   )-----------( 164      ( 19 Feb 2017 06:25 )             40.6     19 Feb 2017 06:22    141    |  107    |  31     ----------------------------<  131    4.4     |  25     |  1.29     Ca    8.3        19 Feb 2017 06:22  Mg     2.2       19 Feb 2017 06:22          ABO Interpretation: A (02-19 @ 05:30)      Hgb A1C: 7.6    EKG:  atrial rate 56  Diagnosis Line Sinus bradycardia  Rightward axis    CXR:  Portable view of the chest demonstrates low lung volumes.   Midline trachea. Normal heart size. No consolidation. No pleural   effusion. No active chest disease.    Cath Report:  3vcad     Echo:  There is mild concentric left ventricular hypertrophy.The left ventricular wall motion   is normal.The left ventricular ejection fraction is estimated to be   60-65%The left atrial size is normal.Right atrial size is normal.The right   ventricle is normal in size and function.There is moderate aortic valve   thickening.There is trace aortic regurgitation.No hemodynamically significant valvular aortic  stenosis.Structurally normal mitral valve.No mitral regurgitation  noted.Structurally normal tricuspid valve.No tricuspid regurgitation   noted.No aortic root dilatation.There is no pericardial effusion.    PFT's:    Carotid Duplex:  No evidence of hemodynamically significant stenosis.    Consult in Chart?  YES   Consent in Chart? YES   Pre-op Orders Placed? YES   Blood Prodeucts Ordered? YES   NPO ordered? YES

## 2017-02-19 NOTE — PROGRESS NOTE ADULT - PROBLEM SELECTOR PROBLEM 4
DM type 2 (diabetes mellitus, type 2)

## 2017-02-20 ENCOUNTER — APPOINTMENT (OUTPATIENT)
Dept: CARDIOTHORACIC SURGERY | Facility: HOSPITAL | Age: 67
End: 2017-02-20

## 2017-02-20 LAB
ALBUMIN SERPL ELPH-MCNC: 3.1 G/DL — LOW (ref 3.4–5)
ALBUMIN SERPL ELPH-MCNC: 3.2 G/DL — LOW (ref 3.4–5)
ALP SERPL-CCNC: 29 U/L — LOW (ref 40–120)
ALP SERPL-CCNC: 34 U/L — LOW (ref 40–120)
ALT FLD-CCNC: 36 U/L — SIGNIFICANT CHANGE UP (ref 12–42)
ALT FLD-CCNC: 39 U/L — SIGNIFICANT CHANGE UP (ref 12–42)
ANION GAP SERPL CALC-SCNC: 10 MMOL/L — SIGNIFICANT CHANGE UP (ref 9–16)
ANION GAP SERPL CALC-SCNC: 13 MMOL/L — SIGNIFICANT CHANGE UP (ref 9–16)
ANION GAP SERPL CALC-SCNC: 8 MMOL/L — LOW (ref 9–16)
ANION GAP SERPL CALC-SCNC: 9 MMOL/L — SIGNIFICANT CHANGE UP (ref 9–16)
APTT BLD: 28.2 SEC — SIGNIFICANT CHANGE UP (ref 27.5–37.4)
APTT BLD: 31.9 SEC — SIGNIFICANT CHANGE UP (ref 27.5–37.4)
APTT BLD: 33.7 SEC — SIGNIFICANT CHANGE UP (ref 27.5–37.4)
AST SERPL-CCNC: 50 U/L — HIGH (ref 15–37)
AST SERPL-CCNC: 51 U/L — HIGH (ref 15–37)
BASE EXCESS BLDA CALC-SCNC: -1.8 MMOL/L — SIGNIFICANT CHANGE UP (ref -2–3)
BASE EXCESS BLDA CALC-SCNC: -2 MMOL/L — SIGNIFICANT CHANGE UP (ref -2–3)
BASE EXCESS BLDA CALC-SCNC: -4.3 MMOL/L — LOW (ref -2–3)
BASOPHILS NFR BLD AUTO: 0.1 % — SIGNIFICANT CHANGE UP (ref 0–2)
BILIRUB SERPL-MCNC: 0.7 MG/DL — SIGNIFICANT CHANGE UP (ref 0.2–1.2)
BILIRUB SERPL-MCNC: 0.8 MG/DL — SIGNIFICANT CHANGE UP (ref 0.2–1.2)
BUN SERPL-MCNC: 22 MG/DL — SIGNIFICANT CHANGE UP (ref 7–23)
BUN SERPL-MCNC: 23 MG/DL — SIGNIFICANT CHANGE UP (ref 7–23)
BUN SERPL-MCNC: 25 MG/DL — HIGH (ref 7–23)
BUN SERPL-MCNC: 27 MG/DL — HIGH (ref 7–23)
CA-I BLDA-SCNC: 1.13 MMOL/L — SIGNIFICANT CHANGE UP (ref 1.1–1.3)
CALCIUM SERPL-MCNC: 8 MG/DL — LOW (ref 8.5–10.5)
CALCIUM SERPL-MCNC: 8.3 MG/DL — LOW (ref 8.5–10.5)
CALCIUM SERPL-MCNC: 8.7 MG/DL — SIGNIFICANT CHANGE UP (ref 8.5–10.5)
CALCIUM SERPL-MCNC: 8.9 MG/DL — SIGNIFICANT CHANGE UP (ref 8.5–10.5)
CHLORIDE SERPL-SCNC: 105 MMOL/L — SIGNIFICANT CHANGE UP (ref 96–108)
CHLORIDE SERPL-SCNC: 106 MMOL/L — SIGNIFICANT CHANGE UP (ref 96–108)
CHLORIDE SERPL-SCNC: 106 MMOL/L — SIGNIFICANT CHANGE UP (ref 96–108)
CHLORIDE SERPL-SCNC: 109 MMOL/L — HIGH (ref 96–108)
CO2 SERPL-SCNC: 23 MMOL/L — SIGNIFICANT CHANGE UP (ref 22–31)
CO2 SERPL-SCNC: 25 MMOL/L — SIGNIFICANT CHANGE UP (ref 22–31)
CO2 SERPL-SCNC: 26 MMOL/L — SIGNIFICANT CHANGE UP (ref 22–31)
CO2 SERPL-SCNC: 27 MMOL/L — SIGNIFICANT CHANGE UP (ref 22–31)
COHGB MFR BLDA: 0.8 % — SIGNIFICANT CHANGE UP
CREAT SERPL-MCNC: 1.15 MG/DL — SIGNIFICANT CHANGE UP (ref 0.5–1.3)
CREAT SERPL-MCNC: 1.2 MG/DL — SIGNIFICANT CHANGE UP (ref 0.5–1.3)
CREAT SERPL-MCNC: 1.33 MG/DL — HIGH (ref 0.5–1.3)
CREAT SERPL-MCNC: 1.5 MG/DL — HIGH (ref 0.5–1.3)
EOSINOPHIL NFR BLD AUTO: 0.4 % — SIGNIFICANT CHANGE UP (ref 0–6)
GAS PNL BLDA: SIGNIFICANT CHANGE UP
GLUCOSE SERPL-MCNC: 108 MG/DL — HIGH (ref 70–99)
GLUCOSE SERPL-MCNC: 138 MG/DL — HIGH (ref 70–99)
GLUCOSE SERPL-MCNC: 171 MG/DL — HIGH (ref 70–99)
GLUCOSE SERPL-MCNC: 99 MG/DL — SIGNIFICANT CHANGE UP (ref 70–99)
HCO3 BLDA-SCNC: 20 MMOL/L — LOW (ref 21–28)
HCO3 BLDA-SCNC: 22 MMOL/L — SIGNIFICANT CHANGE UP (ref 21–28)
HCO3 BLDA-SCNC: 23 MMOL/L — SIGNIFICANT CHANGE UP (ref 21–28)
HCT VFR BLD CALC: 26.8 % — LOW (ref 39–50)
HCT VFR BLD CALC: 29.3 % — LOW (ref 39–50)
HCT VFR BLD CALC: 32.3 % — LOW (ref 39–50)
HCT VFR BLD CALC: 42.2 % — SIGNIFICANT CHANGE UP (ref 39–50)
HGB BLD-MCNC: 10.2 G/DL — LOW (ref 13–17)
HGB BLD-MCNC: 10.9 G/DL — LOW (ref 13–17)
HGB BLD-MCNC: 14.1 G/DL — SIGNIFICANT CHANGE UP (ref 13–17)
HGB BLD-MCNC: 8.9 G/DL — LOW (ref 13–17)
HGB BLDA-MCNC: 14.4 G/DL — SIGNIFICANT CHANGE UP (ref 13–17)
INR BLD: 1.11 — SIGNIFICANT CHANGE UP (ref 0.88–1.16)
INR BLD: 1.27 — HIGH (ref 0.88–1.16)
INR BLD: 1.36 — HIGH (ref 0.88–1.16)
INR BLD: 1.37 — HIGH (ref 0.88–1.16)
LACTATE SERPL-SCNC: 0.8 MMOL/L — SIGNIFICANT CHANGE UP (ref 0.5–2)
LACTATE SERPL-SCNC: 1.5 MMOL/L — SIGNIFICANT CHANGE UP (ref 0.5–2)
LYMPHOCYTES # BLD AUTO: 11.2 % — LOW (ref 13–44)
MAGNESIUM SERPL-MCNC: 1.9 MG/DL — SIGNIFICANT CHANGE UP (ref 1.6–2.4)
MAGNESIUM SERPL-MCNC: 2.1 MG/DL — SIGNIFICANT CHANGE UP (ref 1.6–2.4)
MAGNESIUM SERPL-MCNC: 2.4 MG/DL — SIGNIFICANT CHANGE UP (ref 1.6–2.4)
MCHC RBC-ENTMCNC: 31 PG — SIGNIFICANT CHANGE UP (ref 27–34)
MCHC RBC-ENTMCNC: 31.2 PG — SIGNIFICANT CHANGE UP (ref 27–34)
MCHC RBC-ENTMCNC: 31.4 PG — SIGNIFICANT CHANGE UP (ref 27–34)
MCHC RBC-ENTMCNC: 32.4 PG — SIGNIFICANT CHANGE UP (ref 27–34)
MCHC RBC-ENTMCNC: 33.2 G/DL — SIGNIFICANT CHANGE UP (ref 32–36)
MCHC RBC-ENTMCNC: 33.4 G/DL — SIGNIFICANT CHANGE UP (ref 32–36)
MCHC RBC-ENTMCNC: 33.7 G/DL — SIGNIFICANT CHANGE UP (ref 32–36)
MCHC RBC-ENTMCNC: 34.8 G/DL — SIGNIFICANT CHANGE UP (ref 32–36)
MCV RBC AUTO: 92.6 FL — SIGNIFICANT CHANGE UP (ref 80–100)
MCV RBC AUTO: 93 FL — SIGNIFICANT CHANGE UP (ref 80–100)
MCV RBC AUTO: 93.4 FL — SIGNIFICANT CHANGE UP (ref 80–100)
MCV RBC AUTO: 94 FL — SIGNIFICANT CHANGE UP (ref 80–100)
METHGB MFR BLDA: 0.4 % — SIGNIFICANT CHANGE UP
MONOCYTES NFR BLD AUTO: 7.9 % — SIGNIFICANT CHANGE UP (ref 2–14)
NEUTROPHILS NFR BLD AUTO: 80.4 % — HIGH (ref 43–77)
O2 CT VFR BLDA CALC: 20.2 ML/DL — SIGNIFICANT CHANGE UP (ref 15–23)
OXYHGB MFR BLDA: 98 % — SIGNIFICANT CHANGE UP (ref 94–100)
PCO2 BLDA: 36 MMHG — SIGNIFICANT CHANGE UP (ref 35–48)
PCO2 BLDA: 37 MMHG — SIGNIFICANT CHANGE UP (ref 35–48)
PCO2 BLDA: 39 MMHG — SIGNIFICANT CHANGE UP (ref 35–48)
PH BLDA: 7.37 — SIGNIFICANT CHANGE UP (ref 7.35–7.45)
PH BLDA: 7.38 — SIGNIFICANT CHANGE UP (ref 7.35–7.45)
PH BLDA: 7.4 — SIGNIFICANT CHANGE UP (ref 7.35–7.45)
PHOSPHATE SERPL-MCNC: 2.2 MG/DL — LOW (ref 2.5–4.5)
PHOSPHATE SERPL-MCNC: 3.7 MG/DL — SIGNIFICANT CHANGE UP (ref 2.5–4.5)
PLATELET # BLD AUTO: 126 K/UL — LOW (ref 150–400)
PLATELET # BLD AUTO: 142 K/UL — LOW (ref 150–400)
PLATELET # BLD AUTO: 156 K/UL — SIGNIFICANT CHANGE UP (ref 150–400)
PLATELET # BLD AUTO: 170 K/UL — SIGNIFICANT CHANGE UP (ref 150–400)
PO2 BLDA: 173 MMHG — HIGH (ref 83–108)
PO2 BLDA: 217 MMHG — HIGH (ref 83–108)
PO2 BLDA: 91 MMHG — SIGNIFICANT CHANGE UP (ref 83–108)
POTASSIUM BLDA-SCNC: 4.2 MMOL/L — SIGNIFICANT CHANGE UP (ref 3.5–4.9)
POTASSIUM SERPL-MCNC: 3.9 MMOL/L — SIGNIFICANT CHANGE UP (ref 3.5–5.3)
POTASSIUM SERPL-MCNC: 4.1 MMOL/L — SIGNIFICANT CHANGE UP (ref 3.5–5.3)
POTASSIUM SERPL-MCNC: 4.3 MMOL/L — SIGNIFICANT CHANGE UP (ref 3.5–5.3)
POTASSIUM SERPL-MCNC: 4.4 MMOL/L — SIGNIFICANT CHANGE UP (ref 3.5–5.3)
POTASSIUM SERPL-SCNC: 3.9 MMOL/L — SIGNIFICANT CHANGE UP (ref 3.5–5.3)
POTASSIUM SERPL-SCNC: 4.1 MMOL/L — SIGNIFICANT CHANGE UP (ref 3.5–5.3)
POTASSIUM SERPL-SCNC: 4.3 MMOL/L — SIGNIFICANT CHANGE UP (ref 3.5–5.3)
POTASSIUM SERPL-SCNC: 4.4 MMOL/L — SIGNIFICANT CHANGE UP (ref 3.5–5.3)
PROT SERPL-MCNC: 5.1 G/DL — LOW (ref 6.4–8.2)
PROT SERPL-MCNC: 5.2 G/DL — LOW (ref 6.4–8.2)
PROTHROM AB SERPL-ACNC: 12.3 SEC — SIGNIFICANT CHANGE UP (ref 10–13.1)
PROTHROM AB SERPL-ACNC: 14.1 SEC — HIGH (ref 10–13.1)
PROTHROM AB SERPL-ACNC: 15.1 SEC — HIGH (ref 10–13.1)
PROTHROM AB SERPL-ACNC: 15.3 SEC — HIGH (ref 10–13.1)
RBC # BLD: 2.87 M/UL — LOW (ref 4.2–5.8)
RBC # BLD: 3.15 M/UL — LOW (ref 4.2–5.8)
RBC # BLD: 3.49 M/UL — LOW (ref 4.2–5.8)
RBC # BLD: 4.49 M/UL — SIGNIFICANT CHANGE UP (ref 4.2–5.8)
RBC # FLD: 13.3 % — SIGNIFICANT CHANGE UP (ref 10.3–16.9)
RBC # FLD: 13.5 % — SIGNIFICANT CHANGE UP (ref 10.3–16.9)
RBC # FLD: 13.8 % — SIGNIFICANT CHANGE UP (ref 10.3–16.9)
RBC # FLD: 13.9 % — SIGNIFICANT CHANGE UP (ref 10.3–16.9)
SAO2 % BLDA: 97 % — SIGNIFICANT CHANGE UP (ref 95–100)
SAO2 % BLDA: 99 % — SIGNIFICANT CHANGE UP (ref 95–100)
SAO2 % BLDA: 99 % — SIGNIFICANT CHANGE UP (ref 95–100)
SODIUM BLDA-SCNC: 137 MMOL/L — LOW (ref 138–146)
SODIUM SERPL-SCNC: 141 MMOL/L — SIGNIFICANT CHANGE UP (ref 135–145)
SODIUM SERPL-SCNC: 141 MMOL/L — SIGNIFICANT CHANGE UP (ref 135–145)
SODIUM SERPL-SCNC: 142 MMOL/L — SIGNIFICANT CHANGE UP (ref 135–145)
SODIUM SERPL-SCNC: 143 MMOL/L — SIGNIFICANT CHANGE UP (ref 135–145)
WBC # BLD: 10 K/UL — SIGNIFICANT CHANGE UP (ref 3.8–10.5)
WBC # BLD: 15.1 K/UL — HIGH (ref 3.8–10.5)
WBC # BLD: 18.3 K/UL — HIGH (ref 3.8–10.5)
WBC # BLD: 7.1 K/UL — SIGNIFICANT CHANGE UP (ref 3.8–10.5)
WBC # FLD AUTO: 10 K/UL — SIGNIFICANT CHANGE UP (ref 3.8–10.5)
WBC # FLD AUTO: 15.1 K/UL — HIGH (ref 3.8–10.5)
WBC # FLD AUTO: 18.3 K/UL — HIGH (ref 3.8–10.5)
WBC # FLD AUTO: 7.1 K/UL — SIGNIFICANT CHANGE UP (ref 3.8–10.5)

## 2017-02-20 PROCEDURE — 93010 ELECTROCARDIOGRAM REPORT: CPT

## 2017-02-20 PROCEDURE — 33517 CABG ARTERY-VEIN SINGLE: CPT

## 2017-02-20 PROCEDURE — 33533 CABG ARTERIAL SINGLE: CPT

## 2017-02-20 PROCEDURE — 71010: CPT | Mod: 26

## 2017-02-20 PROCEDURE — 99291 CRITICAL CARE FIRST HOUR: CPT

## 2017-02-20 PROCEDURE — 99292 CRITICAL CARE ADDL 30 MIN: CPT

## 2017-02-20 RX ORDER — ACETAMINOPHEN 500 MG
1000 TABLET ORAL ONCE
Qty: 0 | Refills: 0 | Status: COMPLETED | OUTPATIENT
Start: 2017-02-20 | End: 2017-02-21

## 2017-02-20 RX ORDER — ATORVASTATIN CALCIUM 80 MG/1
40 TABLET, FILM COATED ORAL AT BEDTIME
Qty: 0 | Refills: 0 | Status: DISCONTINUED | OUTPATIENT
Start: 2017-02-20 | End: 2017-02-24

## 2017-02-20 RX ORDER — INSULIN HUMAN 100 [IU]/ML
1 INJECTION, SOLUTION SUBCUTANEOUS
Qty: 250 | Refills: 0 | Status: DISCONTINUED | OUTPATIENT
Start: 2017-02-20 | End: 2017-02-21

## 2017-02-20 RX ORDER — SODIUM CHLORIDE 9 MG/ML
1000 INJECTION, SOLUTION INTRAVENOUS
Qty: 0 | Refills: 0 | Status: DISCONTINUED | OUTPATIENT
Start: 2017-02-20 | End: 2017-02-23

## 2017-02-20 RX ORDER — CALCIUM GLUCONATE 100 MG/ML
2 VIAL (ML) INTRAVENOUS ONCE
Qty: 0 | Refills: 0 | Status: COMPLETED | OUTPATIENT
Start: 2017-02-20 | End: 2017-02-20

## 2017-02-20 RX ORDER — SODIUM CHLORIDE 9 MG/ML
500 INJECTION INTRAMUSCULAR; INTRAVENOUS; SUBCUTANEOUS ONCE
Qty: 0 | Refills: 0 | Status: COMPLETED | OUTPATIENT
Start: 2017-02-20 | End: 2017-02-20

## 2017-02-20 RX ORDER — FAMOTIDINE 10 MG/ML
20 INJECTION INTRAVENOUS DAILY
Qty: 0 | Refills: 0 | Status: DISCONTINUED | OUTPATIENT
Start: 2017-02-20 | End: 2017-02-24

## 2017-02-20 RX ORDER — NOREPINEPHRINE BITARTRATE/D5W 8 MG/250ML
0.01 PLASTIC BAG, INJECTION (ML) INTRAVENOUS
Qty: 8 | Refills: 0 | Status: DISCONTINUED | OUTPATIENT
Start: 2017-02-20 | End: 2017-02-21

## 2017-02-20 RX ORDER — ALBUMIN HUMAN 25 %
250 VIAL (ML) INTRAVENOUS ONCE
Qty: 0 | Refills: 0 | Status: COMPLETED | OUTPATIENT
Start: 2017-02-20 | End: 2017-02-20

## 2017-02-20 RX ORDER — MORPHINE SULFATE 50 MG/1
2 CAPSULE, EXTENDED RELEASE ORAL ONCE
Qty: 0 | Refills: 0 | Status: DISCONTINUED | OUTPATIENT
Start: 2017-02-20 | End: 2017-02-20

## 2017-02-20 RX ORDER — ACETAMINOPHEN 500 MG
1000 TABLET ORAL ONCE
Qty: 0 | Refills: 0 | Status: COMPLETED | OUTPATIENT
Start: 2017-02-20 | End: 2017-02-20

## 2017-02-20 RX ORDER — MEPERIDINE HYDROCHLORIDE 50 MG/ML
25 INJECTION INTRAMUSCULAR; INTRAVENOUS; SUBCUTANEOUS ONCE
Qty: 0 | Refills: 0 | Status: DISCONTINUED | OUTPATIENT
Start: 2017-02-20 | End: 2017-02-20

## 2017-02-20 RX ORDER — HYDRALAZINE HCL 50 MG
10 TABLET ORAL ONCE
Qty: 0 | Refills: 0 | Status: COMPLETED | OUTPATIENT
Start: 2017-02-20 | End: 2017-02-20

## 2017-02-20 RX ORDER — CLOPIDOGREL BISULFATE 75 MG/1
75 TABLET, FILM COATED ORAL DAILY
Qty: 0 | Refills: 0 | Status: DISCONTINUED | OUTPATIENT
Start: 2017-02-20 | End: 2017-02-24

## 2017-02-20 RX ORDER — FAMOTIDINE 10 MG/ML
20 INJECTION INTRAVENOUS EVERY 24 HOURS
Qty: 0 | Refills: 0 | Status: DISCONTINUED | OUTPATIENT
Start: 2017-02-20 | End: 2017-02-20

## 2017-02-20 RX ORDER — ASPIRIN/CALCIUM CARB/MAGNESIUM 324 MG
81 TABLET ORAL DAILY
Qty: 0 | Refills: 0 | Status: DISCONTINUED | OUTPATIENT
Start: 2017-02-20 | End: 2017-02-24

## 2017-02-20 RX ORDER — CEFAZOLIN SODIUM 1 G
2000 VIAL (EA) INJECTION EVERY 8 HOURS
Qty: 0 | Refills: 0 | Status: COMPLETED | OUTPATIENT
Start: 2017-02-20 | End: 2017-02-22

## 2017-02-20 RX ORDER — CHLORHEXIDINE GLUCONATE 213 G/1000ML
5 SOLUTION TOPICAL EVERY 4 HOURS
Qty: 0 | Refills: 0 | Status: DISCONTINUED | OUTPATIENT
Start: 2017-02-20 | End: 2017-02-20

## 2017-02-20 RX ORDER — METOCLOPRAMIDE HCL 10 MG
10 TABLET ORAL ONCE
Qty: 0 | Refills: 0 | Status: COMPLETED | OUTPATIENT
Start: 2017-02-20 | End: 2017-02-20

## 2017-02-20 RX ORDER — ASPIRIN/CALCIUM CARB/MAGNESIUM 324 MG
325 TABLET ORAL DAILY
Qty: 0 | Refills: 0 | Status: DISCONTINUED | OUTPATIENT
Start: 2017-02-20 | End: 2017-02-20

## 2017-02-20 RX ORDER — HEPARIN SODIUM 5000 [USP'U]/ML
7500 INJECTION INTRAVENOUS; SUBCUTANEOUS EVERY 8 HOURS
Qty: 0 | Refills: 0 | Status: DISCONTINUED | OUTPATIENT
Start: 2017-02-20 | End: 2017-02-24

## 2017-02-20 RX ADMIN — CHLORHEXIDINE GLUCONATE 5 MILLILITER(S): 213 SOLUTION TOPICAL at 15:17

## 2017-02-20 RX ADMIN — INSULIN HUMAN 1 UNIT(S)/HR: 100 INJECTION, SOLUTION SUBCUTANEOUS at 14:34

## 2017-02-20 RX ADMIN — Medication 200 GRAM(S): at 16:55

## 2017-02-20 RX ADMIN — Medication 125 MILLILITER(S): at 14:36

## 2017-02-20 RX ADMIN — MORPHINE SULFATE 2 MILLIGRAM(S): 50 CAPSULE, EXTENDED RELEASE ORAL at 14:35

## 2017-02-20 RX ADMIN — Medication 10 MILLIGRAM(S): at 00:39

## 2017-02-20 RX ADMIN — SODIUM CHLORIDE 1000 MILLILITER(S): 9 INJECTION INTRAMUSCULAR; INTRAVENOUS; SUBCUTANEOUS at 13:15

## 2017-02-20 RX ADMIN — FAMOTIDINE 20 MILLIGRAM(S): 10 INJECTION INTRAVENOUS at 15:42

## 2017-02-20 RX ADMIN — Medication 125 MILLILITER(S): at 15:16

## 2017-02-20 RX ADMIN — CLOPIDOGREL BISULFATE 75 MILLIGRAM(S): 75 TABLET, FILM COATED ORAL at 19:54

## 2017-02-20 RX ADMIN — Medication 2.93 MICROGRAM(S)/KG/MIN: at 19:42

## 2017-02-20 RX ADMIN — ATORVASTATIN CALCIUM 40 MILLIGRAM(S): 80 TABLET, FILM COATED ORAL at 21:39

## 2017-02-20 RX ADMIN — MORPHINE SULFATE 2 MILLIGRAM(S): 50 CAPSULE, EXTENDED RELEASE ORAL at 15:17

## 2017-02-20 RX ADMIN — Medication 10 MILLIGRAM(S): at 20:20

## 2017-02-20 RX ADMIN — HEPARIN SODIUM 7500 UNIT(S): 5000 INJECTION INTRAVENOUS; SUBCUTANEOUS at 21:39

## 2017-02-20 RX ADMIN — Medication 125 MILLILITER(S): at 16:10

## 2017-02-20 RX ADMIN — Medication 25 MILLIGRAM(S): at 06:46

## 2017-02-20 RX ADMIN — Medication 81 MILLIGRAM(S): at 19:54

## 2017-02-20 RX ADMIN — Medication 100 MILLIGRAM(S): at 15:41

## 2017-02-20 RX ADMIN — Medication 1 APPLICATION(S): at 06:47

## 2017-02-20 RX ADMIN — Medication 400 MILLIGRAM(S): at 18:12

## 2017-02-20 RX ADMIN — Medication 1000 MILLIGRAM(S): at 19:10

## 2017-02-20 RX ADMIN — SODIUM CHLORIDE 10 MILLILITER(S): 9 INJECTION, SOLUTION INTRAVENOUS at 14:41

## 2017-02-20 NOTE — PROGRESS NOTE ADULT - SUBJECTIVE AND OBJECTIVE BOX
CTICU  CRITICAL  CARE  attending     Hand off received 					   Pertinent clinical, laboratory, radiographic, hemodynamic, echocardiographic, respiratory data, microbiologic data and chart were reviewed and analyzed frequently throughout the course of the day and night  Patient seen and examined with CTS/ SH attending at bedside  Pt is a 66y , Male, HEALTH ISSUES - PROBLEM Dx:  CAD (coronary artery disease): CAD (coronary artery disease)  Renal insufficiency: Renal insufficiency  DM type 2 (diabetes mellitus, type 2): DM type 2 (diabetes mellitus, type 2)  HLD (hyperlipidemia): HLD (hyperlipidemia)  HTN (hypertension): HTN (hypertension)  Abnormal stress test: Abnormal stress test      , FAMILY HISTORY:  No pertinent family history in first degree relatives  PAST MEDICAL & SURGICAL HISTORY:  HTN (hypertension)  HLD (hyperlipidemia)  DM type 2 (diabetes mellitus, type 2)  H/O heart artery stent    Patient is a 66y old  Male who presents with a chief complaint of Went for a routine stress test and results were positive. Patient denies any symptoms prior to checkup/est. (2017 21:52)      14 system review was unremarkable  acute changes include acute respiratory failure  Vital signs, hemodynamic and respiratory parameters were reviewed from the bedside nursing flowsheet.  ICU Vital Signs Last 24 Hrs  T(C): 37.1, Max: 37.1 (-20 @ 21:00)  T(F): 98.7, Max: 98.7 (02-20 @ 21:00)  HR: 78 (64 - 92)  BP: 90/51 (83/48 - 182/87)  BP(mean): 64 (56 - 127)  ABP: 126/48 (98/44 - 146/60)  ABP(mean): 66 (60 - 80)  RR: 11 (4 - 22)  SpO2: 99% (95% - 100%)    Adult Advanced Hemodynamics Last 24 Hrs  CVP(mm Hg): 12 (10 - 19)  CVP(cm H2O): --  CO: --  CI: --  PA: --  PA(mean): --  PCWP: --  SVR: --  SVRI: --  PVR: --  PVRI: --, ABG - ( 2017 21:16 )  pH: 7.42  /  pCO2: 37    /  pO2: 115   / HCO3: 24    / Base Excess: -0.7  /  SaO2: 98                Intake and output was reviewed and the fluid balance was calculated  Daily     Daily Weight in k.5 (2017 06:19)  I&O's Summary  I & Os for 24h ending 2017 07:00  =============================================  IN: 840 ml / OUT: 0 ml / NET: 840 ml    I & Os for current day (as of 2017 22:03)  =============================================  IN: 1980 ml / OUT: 1203 ml / NET: 777 ml      All lines and drain sites were assessed  Glycemic trend was reviewed CAPILLARY BLOOD GLUCOSE  101 (2017 21:00)    Neuro:No acute change in mental status.  CVS: S1, S2. + pericardial Rub.  Respiratory: Auscultation of the chest reveals equal bs  GI: Abdomen is soft. No tenderness. + bowel sounds.  VASCULAR: Extremities are warm and well perfused  Wounds appear clean and unremarkable.  Skin: OK  Antibiotics are perioperative ANCEF    labs  CBC Full  -  ( 2017 21:14 )  WBC Count : 10.0 K/uL  Hemoglobin : 8.9 g/dL  Hematocrit : 26.8 %  Platelet Count - Automated : 126 K/uL  Mean Cell Volume : 93.4 fL  Mean Cell Hemoglobin : 31.0 pg  Mean Cell Hemoglobin Concentration : 33.2 g/dL  Auto Neutrophil # : x  Auto Lymphocyte # : x  Auto Monocyte # : x  Auto Eosinophil # : x  Auto Basophil # : x  Auto Neutrophil % : x  Auto Lymphocyte % : x  Auto Monocyte % : x  Auto Eosinophil % : x  Auto Basophil % : x    2017 21:14    143    |  109    |  25     ----------------------------<  99     4.3     |  26     |  1.50     Ca    8.7        2017 21:14  Phos  3.7       2017 21:14  Mg     2.1       2017 21:14    TPro  5.2    /  Alb  3.2    /  TBili  0.7    /  DBili  x      /  AST  51     /  ALT  36     /  AlkPhos  29     2017 21:14    PT/INR - ( 2017 21:14 )   PT: 14.1 sec;   INR: 1.27          PTT - ( 2017 21:14 )  PTT:33.7 sec  The current medications were reviewed   MEDICATIONS  (STANDING):  sodium chloride 0.45%. 1000milliLiter(s) IV Continuous <Continuous>  heparin  Injectable 7500Unit(s) SubCutaneous every 8 hours  ceFAZolin   IVPB 2000milliGRAM(s) IV Intermittent every 8 hours  insulin Infusion 1Unit(s)/Hr IV Continuous <Continuous>  clopidogrel Tablet 75milliGRAM(s) Oral daily  aspirin enteric coated 81milliGRAM(s) Oral daily  atorvastatin 40milliGRAM(s) Oral at bedtime  famotidine    Tablet 20milliGRAM(s) Oral daily  norepinephrine Infusion 0.015MICROgram(s)/kG/Min IV Continuous <Continuous>  acetaminophen  IVPB. 1000milliGRAM(s) IV Intermittent once    MEDICATIONS  (PRN):       PROBLEM LIST/ ASSESSMENT:  HEALTH ISSUES - PROBLEM Dx:  CAD (coronary artery disease): CAD (coronary artery disease)  Renal insufficiency: Renal insufficiency  DM type 2 (diabetes mellitus, type 2): DM type 2 (diabetes mellitus, type 2)  HLD (hyperlipidemia): HLD (hyperlipidemia)  HTN (hypertension): HTN (hypertension)  Abnormal stress test: Abnormal stress test      ,   Patient is a 66y old  Male with CAD, S/P CABG x 2  Global ST elevation (Early repolarization Abnormality). Probably pericarditis.  Stable hemodynamics.   Good oxygenation.   Fair urine output. BUN/Cr: 25/1.5 (Close to baseline).  Overall doing well.  D/C levophed.  D/C substernal CT.    My plan includes :  Close hemodynamic, ventilatory and drain monitoring and management per post op routine    Monitor for arrhythmias and monitor parameters for organ perfusion  Monitor neurologic status  Head of the bed should remain elevated to 45 deg .   Chest PT and IS will be encouraged  Monitor adequacy of oxygenation and ventilation and attempt to wean oxygen  Nutritional goals will be met using po eventually , ensure adequate caloric intake and montior the same  Stress ulcer and VTE prophylaxis will be achieved    Glycemic control is satisfactory  Electrolytes have been repleted as necessary and wound care has been carried out. Pain control has been achieved.   Aggressive  physical therapy and early mobility and ambulation goals will be met.      The family was updated about the course and plan.    CRITICAL CARE TIME SPENT in evaluation and management, reassessments, review and interpretation of labs and x-rays, ventilator and hemodynamic management, formulating a plan and coordinating care: ___40____ MIN.  Time does not include procedural time.    CTICU ATTENDING       					    Hao Paredes MD

## 2017-02-20 NOTE — PROGRESS NOTE ADULT - ASSESSMENT
78yo with history of HTN, DM, CKD ( Cr 1.2-1.5), CAD prior stents admitted with + stress test, on C dx with 3V CAD.  Pt s/p CABG X2, EF nl.  Uncomplicated case.  Arrived on low dose levo, no blood products in OR    Problems  1. CAD s/p CABG X2  2. DM  3. Hemodynamic instability  4. Post op resp failure    Plan   Neuro - pain control, sedation PRN  CVS - on low dose pressor, wean as tolerated  Pulm - vent weaning   GI - GI proph   monitor UOP  Endo - poorly controlled DM pre-op.  Continue insulin gtt  ID - periop antibiotics  Heme - monitor for bleeding.     Critical care time spent 50 min.

## 2017-02-20 NOTE — PROGRESS NOTE ADULT - SUBJECTIVE AND OBJECTIVE BOX
INTERVAL HISTORY:  Awake, alert S/P CABG this am	      MEDICATIONS:    ceFAZolin   IVPB 2000milliGRAM(s) IV Intermittent every 8 hours        famotidine Injectable 20milliGRAM(s) IV Push every 24 hours    insulin Infusion 1Unit(s)/Hr IV Continuous <Continuous>    sodium chloride 0.45%. 1000milliLiter(s) IV Continuous <Continuous>  heparin  Injectable 7500Unit(s) SubCutaneous every 8 hours  chlorhexidine 0.12% Liquid 5milliLiter(s) Swish and Spit every 4 hours  clopidogrel Tablet 75milliGRAM(s) Oral daily  aspirin enteric coated 81milliGRAM(s) Oral daily      Allergies    No Known Allergies    PHYSICAL EXAM:  T(C): 36.8, Max: 36.8 (02-19 @ 21:40)  HR: 82 (64 - 92)  BP: 107/57 (83/48 - 182/87)  RR: 15 (4 - 19)  SpO2: 100% (95% - 100%)  Wt(kg): --  I&O's Summary  I & Os for 24h ending 20 Feb 2017 07:00  =============================================  IN: 840 ml / OUT: 0 ml / NET: 840 ml    I & Os for current day (as of 20 Feb 2017 18:31)  =============================================  IN: 1657.5 ml / OUT: 865 ml / NET: 792.5 ml        Appearance: Normal	  Neck: FROM supple, no JVD, bruits or thyromegaly	  Lymphatic: No lymphadenopathy  Cardiovascular: Normal S1 S2,   Respiratory: Lungs clear to auscultation	  Psychiatry: A & O x 3, Mood & affect appropriate  Gastrointestinal:  Soft, NT/ND, + BS, No HSM. No masses	  Neurologic: Non-focal  Extremities:No clubbing, cyanosis or edema      TELEMETRY: 	    ECG:  	  RADIOLOGY:   DIAGNOSTIC TESTING:  [ ] Echocardiogram:  [ ]  Catheterization:  [ ] Stress Test:    OTHER: 	    LABS:	 	    CARDIAC MARKERS:                                  10.2   15.1  )-----------( 142      ( 20 Feb 2017 15:00 )             29.3     20 Feb 2017 15:00    142    |  106    |  23     ----------------------------<  171    4.4     |  23     |  1.33     Ca    8.3        20 Feb 2017 15:00  Phos  2.2       20 Feb 2017 12:51  Mg     2.4       20 Feb 2017 12:51    TPro  5.1    /  Alb  3.1    /  TBili  0.8    /  DBili  x      /  AST  50     /  ALT  39     /  AlkPhos  34     20 Feb 2017 15:00    proBNP:   Lipid Profile:   HgA1c:   TSH:   PT/INR - ( 20 Feb 2017 15:00 )   PT: 15.3 sec;   INR: 1.37          PTT - ( 20 Feb 2017 12:51 )  PTT:28.2 sec  ASSESSMENT/PLAN: 	  ASHD- S/P CABG- Awake, alert, some chest pain, extubated                                No arrhythmia

## 2017-02-20 NOTE — PROGRESS NOTE ADULT - SUBJECTIVE AND OBJECTIVE BOX
Date of surgery : 2/20/2017    Surgeon : Linda    Anesthesia : Shreya     Procedure : CABG X2    Pump Time :            62      min        Aortic X -Clamp :         34   min    EF :     Pre OP :            55%                    Post OP :            nl                                          Airway :       Difficult Airway :                   [ ] Yes     [x ] No      ETT             Size :      8       Lip Line :      22          Ventilator setting :              [ x] ON          [ ] BS +           [ ] ETCO2               Lines :      [ ] PA catheter      [x ] Radial Arterial Line              [  ] Right              [x  ] Left       [ ] Femoral Arterial Line          [  ] Right              [  ] Left      [ x] Central Line   IJ                     [  ] Right              [ x ] Left      [ ] Femoral Central line            [  ] Right              [  ] Left     Tubes :      Blakes :                                      [  ] Med.             [  ] Pleural      Chest Tubes :                           [x  ] Med.             [x] Pleural       Pacing     Wires :             [  x ] Atrial                  [ x  ]  Venticular      Pacer Setting :                   Threshold  :                Sensitivity :       Intake     Drips :   Levo 0.02mcg/kg/min     IVF : 2200     Albumin : 500     Product :            [   ]  PRBC       [  ] Platelet     [   ] FFP          [   ] Cryoprecipitate                                  [   ]  Cell saver                                  [   ]  Hemostatic agent :                                  [   ]   Factors :       Output      EBL : min      Urine : 1100  Antibiotics :  Ancef 2 gm     ICU Vital Signs Last 24 Hrs  T(C): 36.5, Max: 37.1 (02-19 @ 16:05)  T(F): 97.7, Max: 98.8 (02-19 @ 16:05)  HR: 80 (64 - 92)  BP: 103/53 (103/53 - 182/87)  BP(mean): 67 (67 - 127)  ABP: 98/44 (98/44 - 120/50)  ABP(mean): 60 (60 - 70)  RR: 13 (10 - 19)  SpO2: 99% (95% - 99%)    I&O's Summary  I & Os for 24h ending 20 Feb 2017 07:00  =============================================  IN: 840 ml / OUT: 0 ml / NET: 840 ml    I & Os for current day (as of 20 Feb 2017 14:09)  =============================================  IN: 550 ml / OUT: 185 ml / NET: 365 ml    ABG - ( 20 Feb 2017 12:54 )  pH: 7.38  /  pCO2: 39    /  pO2: 91    / HCO3: 23    / Base Excess: -2.0  /  SaO2: 97                                      10.9   18.3  )-----------( 156      ( 20 Feb 2017 12:51 )             32.3     20 Feb 2017 12:51    141    |  106    |  22     ----------------------------<  108    4.1     |  25     |  1.15     Ca    8.0        20 Feb 2017 12:51  Phos  2.2       20 Feb 2017 12:51  Mg     2.4       20 Feb 2017 12:51      PT/INR - ( 20 Feb 2017 12:51 )   PT: 15.1 sec;   INR: 1.36          PTT - ( 20 Feb 2017 12:51 )  PTT:28.2 sec  MEDICATIONS  (STANDING):  sodium chloride 0.45%. 1000milliLiter(s) IV Continuous <Continuous>  heparin  Injectable 7500Unit(s) SubCutaneous every 8 hours  ceFAZolin   IVPB 2000milliGRAM(s) IV Intermittent every 8 hours  chlorhexidine 0.12% Liquid 5milliLiter(s) Swish and Spit every 4 hours  famotidine Injectable 20milliGRAM(s) IV Push every 24 hours  insulin Infusion 1Unit(s)/Hr IV Continuous <Continuous>  albumin human  5% IVPB 250milliLiter(s) IV Intermittent once  albumin human  5% IVPB 250milliLiter(s) IV Intermittent once  clopidogrel Tablet 75milliGRAM(s) Oral daily  aspirin enteric coated 81milliGRAM(s) Oral daily  sodium chloride 0.9% Bolus 500milliLiter(s) IV Bolus once      PHYSICAL EXAM:  Gen : no acute distress  Neck: No LAD, No JVD  Respiratory: decreased in the bases  Cardiovascular: S1 and S2, RRR, no M/G/R  Gastrointestinal: BS+, soft, NT/ND  Extremities: No peripheral edema  Vascular: 2+ peripheral pulses  Neurological: A/O x 3, no focal deficits  Incision: clean dry/ no sign of infection  Lines: no sign of infection

## 2017-02-21 LAB
ALBUMIN SERPL ELPH-MCNC: 3.2 G/DL — LOW (ref 3.4–5)
ALBUMIN SERPL ELPH-MCNC: 3.4 G/DL — SIGNIFICANT CHANGE UP (ref 3.4–5)
ALBUMIN SERPL ELPH-MCNC: 3.5 G/DL — SIGNIFICANT CHANGE UP (ref 3.4–5)
ALBUMIN SERPL ELPH-MCNC: 3.5 G/DL — SIGNIFICANT CHANGE UP (ref 3.4–5)
ALP SERPL-CCNC: 101 U/L — SIGNIFICANT CHANGE UP (ref 40–120)
ALP SERPL-CCNC: 30 U/L — LOW (ref 40–120)
ALP SERPL-CCNC: 32 U/L — LOW (ref 40–120)
ALP SERPL-CCNC: 33 U/L — LOW (ref 40–120)
ALT FLD-CCNC: 25 U/L — SIGNIFICANT CHANGE UP (ref 12–42)
ALT FLD-CCNC: 27 U/L — SIGNIFICANT CHANGE UP (ref 12–42)
ALT FLD-CCNC: 30 U/L — SIGNIFICANT CHANGE UP (ref 12–42)
ALT FLD-CCNC: 31 U/L — SIGNIFICANT CHANGE UP (ref 12–42)
ANION GAP SERPL CALC-SCNC: 10 MMOL/L — SIGNIFICANT CHANGE UP (ref 9–16)
ANION GAP SERPL CALC-SCNC: 12 MMOL/L — SIGNIFICANT CHANGE UP (ref 9–16)
ANION GAP SERPL CALC-SCNC: 14 MMOL/L — SIGNIFICANT CHANGE UP (ref 9–16)
ANION GAP SERPL CALC-SCNC: 8 MMOL/L — LOW (ref 9–16)
APTT BLD: 27.2 SEC — LOW (ref 27.5–37.4)
APTT BLD: 29.1 SEC — SIGNIFICANT CHANGE UP (ref 27.5–37.4)
AST SERPL-CCNC: 38 U/L — HIGH (ref 15–37)
AST SERPL-CCNC: 40 U/L — HIGH (ref 15–37)
AST SERPL-CCNC: 46 U/L — HIGH (ref 15–37)
AST SERPL-CCNC: 47 U/L — HIGH (ref 15–37)
BASE EXCESS BLDA CALC-SCNC: -6.5 MMOL/L — LOW (ref -2–3)
BILIRUB SERPL-MCNC: 0.6 MG/DL — SIGNIFICANT CHANGE UP (ref 0.2–1.2)
BILIRUB SERPL-MCNC: 0.7 MG/DL — SIGNIFICANT CHANGE UP (ref 0.2–1.2)
BILIRUB SERPL-MCNC: 0.9 MG/DL — SIGNIFICANT CHANGE UP (ref 0.2–1.2)
BILIRUB SERPL-MCNC: 1 MG/DL — SIGNIFICANT CHANGE UP (ref 0.2–1.2)
BUN SERPL-MCNC: 26 MG/DL — HIGH (ref 7–23)
BUN SERPL-MCNC: 29 MG/DL — HIGH (ref 7–23)
BUN SERPL-MCNC: 34 MG/DL — HIGH (ref 7–23)
BUN SERPL-MCNC: 37 MG/DL — HIGH (ref 7–23)
CALCIUM SERPL-MCNC: 7.8 MG/DL — LOW (ref 8.5–10.5)
CALCIUM SERPL-MCNC: 7.9 MG/DL — LOW (ref 8.5–10.5)
CALCIUM SERPL-MCNC: 8.3 MG/DL — LOW (ref 8.5–10.5)
CALCIUM SERPL-MCNC: 8.6 MG/DL — SIGNIFICANT CHANGE UP (ref 8.5–10.5)
CHLORIDE SERPL-SCNC: 104 MMOL/L — SIGNIFICANT CHANGE UP (ref 96–108)
CHLORIDE SERPL-SCNC: 105 MMOL/L — SIGNIFICANT CHANGE UP (ref 96–108)
CHLORIDE SERPL-SCNC: 106 MMOL/L — SIGNIFICANT CHANGE UP (ref 96–108)
CHLORIDE SERPL-SCNC: 109 MMOL/L — HIGH (ref 96–108)
CO2 SERPL-SCNC: 19 MMOL/L — LOW (ref 22–31)
CO2 SERPL-SCNC: 22 MMOL/L — SIGNIFICANT CHANGE UP (ref 22–31)
CO2 SERPL-SCNC: 24 MMOL/L — SIGNIFICANT CHANGE UP (ref 22–31)
CO2 SERPL-SCNC: 25 MMOL/L — SIGNIFICANT CHANGE UP (ref 22–31)
CREAT SERPL-MCNC: 1.71 MG/DL — HIGH (ref 0.5–1.3)
CREAT SERPL-MCNC: 1.86 MG/DL — HIGH (ref 0.5–1.3)
CREAT SERPL-MCNC: 2.14 MG/DL — HIGH (ref 0.5–1.3)
CREAT SERPL-MCNC: 2.36 MG/DL — HIGH (ref 0.5–1.3)
GAS PNL BLDA: SIGNIFICANT CHANGE UP
GLUCOSE SERPL-MCNC: 110 MG/DL — HIGH (ref 70–99)
GLUCOSE SERPL-MCNC: 178 MG/DL — HIGH (ref 70–99)
GLUCOSE SERPL-MCNC: 211 MG/DL — HIGH (ref 70–99)
GLUCOSE SERPL-MCNC: 238 MG/DL — HIGH (ref 70–99)
HCO3 BLDA-SCNC: 18 MMOL/L — LOW (ref 21–28)
HCT VFR BLD CALC: 23.4 % — LOW (ref 39–50)
HCT VFR BLD CALC: 25.3 % — LOW (ref 39–50)
HCT VFR BLD CALC: 25.8 % — LOW (ref 39–50)
HCT VFR BLD CALC: 28.1 % — LOW (ref 39–50)
HGB BLD-MCNC: 7.9 G/DL — LOW (ref 13–17)
HGB BLD-MCNC: 8.4 G/DL — LOW (ref 13–17)
HGB BLD-MCNC: 8.8 G/DL — LOW (ref 13–17)
HGB BLD-MCNC: 9.4 G/DL — LOW (ref 13–17)
INR BLD: 1.2 — HIGH (ref 0.88–1.16)
INR BLD: 1.37 — HIGH (ref 0.88–1.16)
LACTATE SERPL-SCNC: 0.6 MMOL/L — SIGNIFICANT CHANGE UP (ref 0.5–2)
LACTATE SERPL-SCNC: 1.1 MMOL/L — SIGNIFICANT CHANGE UP (ref 0.5–2)
MAGNESIUM SERPL-MCNC: 2.1 MG/DL — SIGNIFICANT CHANGE UP (ref 1.6–2.4)
MAGNESIUM SERPL-MCNC: 2.1 MG/DL — SIGNIFICANT CHANGE UP (ref 1.6–2.4)
MAGNESIUM SERPL-MCNC: 2.2 MG/DL — SIGNIFICANT CHANGE UP (ref 1.6–2.4)
MCHC RBC-ENTMCNC: 30.5 PG — SIGNIFICANT CHANGE UP (ref 27–34)
MCHC RBC-ENTMCNC: 31.2 PG — SIGNIFICANT CHANGE UP (ref 27–34)
MCHC RBC-ENTMCNC: 31.6 PG — SIGNIFICANT CHANGE UP (ref 27–34)
MCHC RBC-ENTMCNC: 31.8 PG — SIGNIFICANT CHANGE UP (ref 27–34)
MCHC RBC-ENTMCNC: 33.2 G/DL — SIGNIFICANT CHANGE UP (ref 32–36)
MCHC RBC-ENTMCNC: 33.5 G/DL — SIGNIFICANT CHANGE UP (ref 32–36)
MCHC RBC-ENTMCNC: 33.8 G/DL — SIGNIFICANT CHANGE UP (ref 32–36)
MCHC RBC-ENTMCNC: 34.1 G/DL — SIGNIFICANT CHANGE UP (ref 32–36)
MCV RBC AUTO: 92 FL — SIGNIFICANT CHANGE UP (ref 80–100)
MCV RBC AUTO: 93.1 FL — SIGNIFICANT CHANGE UP (ref 80–100)
MCV RBC AUTO: 93.4 FL — SIGNIFICANT CHANGE UP (ref 80–100)
MCV RBC AUTO: 93.6 FL — SIGNIFICANT CHANGE UP (ref 80–100)
PCO2 BLDA: 30 MMHG — LOW (ref 35–48)
PH BLDA: 7.38 — SIGNIFICANT CHANGE UP (ref 7.35–7.45)
PHOSPHATE SERPL-MCNC: 4.6 MG/DL — HIGH (ref 2.5–4.5)
PHOSPHATE SERPL-MCNC: 4.7 MG/DL — HIGH (ref 2.5–4.5)
PHOSPHATE SERPL-MCNC: 5.2 MG/DL — HIGH (ref 2.5–4.5)
PLATELET # BLD AUTO: 113 K/UL — LOW (ref 150–400)
PLATELET # BLD AUTO: 117 K/UL — LOW (ref 150–400)
PLATELET # BLD AUTO: 120 K/UL — LOW (ref 150–400)
PLATELET # BLD AUTO: 149 K/UL — LOW (ref 150–400)
PO2 BLDA: 128 MMHG — HIGH (ref 83–108)
POTASSIUM SERPL-MCNC: 4.2 MMOL/L — SIGNIFICANT CHANGE UP (ref 3.5–5.3)
POTASSIUM SERPL-MCNC: 4.2 MMOL/L — SIGNIFICANT CHANGE UP (ref 3.5–5.3)
POTASSIUM SERPL-MCNC: 4.3 MMOL/L — SIGNIFICANT CHANGE UP (ref 3.5–5.3)
POTASSIUM SERPL-MCNC: 4.7 MMOL/L — SIGNIFICANT CHANGE UP (ref 3.5–5.3)
POTASSIUM SERPL-SCNC: 4.2 MMOL/L — SIGNIFICANT CHANGE UP (ref 3.5–5.3)
POTASSIUM SERPL-SCNC: 4.2 MMOL/L — SIGNIFICANT CHANGE UP (ref 3.5–5.3)
POTASSIUM SERPL-SCNC: 4.3 MMOL/L — SIGNIFICANT CHANGE UP (ref 3.5–5.3)
POTASSIUM SERPL-SCNC: 4.7 MMOL/L — SIGNIFICANT CHANGE UP (ref 3.5–5.3)
PROT SERPL-MCNC: 5.3 G/DL — LOW (ref 6.4–8.2)
PROT SERPL-MCNC: 5.6 G/DL — LOW (ref 6.4–8.2)
PROT SERPL-MCNC: 5.7 G/DL — LOW (ref 6.4–8.2)
PROT SERPL-MCNC: 5.7 G/DL — LOW (ref 6.4–8.2)
PROTHROM AB SERPL-ACNC: 13.3 SEC — HIGH (ref 10–13.1)
PROTHROM AB SERPL-ACNC: 15.3 SEC — HIGH (ref 10–13.1)
RBC # BLD: 2.5 M/UL — LOW (ref 4.2–5.8)
RBC # BLD: 2.75 M/UL — LOW (ref 4.2–5.8)
RBC # BLD: 2.77 M/UL — LOW (ref 4.2–5.8)
RBC # BLD: 3.01 M/UL — LOW (ref 4.2–5.8)
RBC # FLD: 14.2 % — SIGNIFICANT CHANGE UP (ref 10.3–16.9)
RBC # FLD: 14.4 % — SIGNIFICANT CHANGE UP (ref 10.3–16.9)
RBC # FLD: 14.4 % — SIGNIFICANT CHANGE UP (ref 10.3–16.9)
RBC # FLD: 14.6 % — SIGNIFICANT CHANGE UP (ref 10.3–16.9)
SAO2 % BLDA: 98 % — SIGNIFICANT CHANGE UP (ref 95–100)
SODIUM SERPL-SCNC: 137 MMOL/L — SIGNIFICANT CHANGE UP (ref 135–145)
SODIUM SERPL-SCNC: 139 MMOL/L — SIGNIFICANT CHANGE UP (ref 135–145)
SODIUM SERPL-SCNC: 139 MMOL/L — SIGNIFICANT CHANGE UP (ref 135–145)
SODIUM SERPL-SCNC: 143 MMOL/L — SIGNIFICANT CHANGE UP (ref 135–145)
WBC # BLD: 10 K/UL — SIGNIFICANT CHANGE UP (ref 3.8–10.5)
WBC # BLD: 10.7 K/UL — HIGH (ref 3.8–10.5)
WBC # BLD: 11 K/UL — HIGH (ref 3.8–10.5)
WBC # BLD: 12.8 K/UL — HIGH (ref 3.8–10.5)
WBC # FLD AUTO: 10 K/UL — SIGNIFICANT CHANGE UP (ref 3.8–10.5)
WBC # FLD AUTO: 10.7 K/UL — HIGH (ref 3.8–10.5)
WBC # FLD AUTO: 11 K/UL — HIGH (ref 3.8–10.5)
WBC # FLD AUTO: 12.8 K/UL — HIGH (ref 3.8–10.5)

## 2017-02-21 PROCEDURE — 99233 SBSQ HOSP IP/OBS HIGH 50: CPT

## 2017-02-21 PROCEDURE — 99291 CRITICAL CARE FIRST HOUR: CPT

## 2017-02-21 PROCEDURE — 71010: CPT | Mod: 26

## 2017-02-21 PROCEDURE — 93010 ELECTROCARDIOGRAM REPORT: CPT

## 2017-02-21 RX ORDER — ALBUMIN HUMAN 25 %
250 VIAL (ML) INTRAVENOUS ONCE
Qty: 0 | Refills: 0 | Status: COMPLETED | OUTPATIENT
Start: 2017-02-21 | End: 2017-02-21

## 2017-02-21 RX ORDER — DOCUSATE SODIUM 100 MG
100 CAPSULE ORAL THREE TIMES A DAY
Qty: 0 | Refills: 0 | Status: DISCONTINUED | OUTPATIENT
Start: 2017-02-21 | End: 2017-02-24

## 2017-02-21 RX ORDER — FUROSEMIDE 40 MG
40 TABLET ORAL ONCE
Qty: 0 | Refills: 0 | Status: COMPLETED | OUTPATIENT
Start: 2017-02-21 | End: 2017-02-21

## 2017-02-21 RX ORDER — FUROSEMIDE 40 MG
20 TABLET ORAL ONCE
Qty: 0 | Refills: 0 | Status: COMPLETED | OUTPATIENT
Start: 2017-02-21 | End: 2017-02-21

## 2017-02-21 RX ORDER — INSULIN GLARGINE 100 [IU]/ML
10 INJECTION, SOLUTION SUBCUTANEOUS EVERY MORNING
Qty: 0 | Refills: 0 | Status: DISCONTINUED | OUTPATIENT
Start: 2017-02-21 | End: 2017-02-22

## 2017-02-21 RX ORDER — METOPROLOL TARTRATE 50 MG
12.5 TABLET ORAL EVERY 6 HOURS
Qty: 0 | Refills: 0 | Status: DISCONTINUED | OUTPATIENT
Start: 2017-02-21 | End: 2017-02-22

## 2017-02-21 RX ORDER — DEXTROSE 50 % IN WATER 50 %
25 SYRINGE (ML) INTRAVENOUS ONCE
Qty: 0 | Refills: 0 | Status: DISCONTINUED | OUTPATIENT
Start: 2017-02-21 | End: 2017-02-22

## 2017-02-21 RX ORDER — DEXTROSE 50 % IN WATER 50 %
12.5 SYRINGE (ML) INTRAVENOUS ONCE
Qty: 0 | Refills: 0 | Status: DISCONTINUED | OUTPATIENT
Start: 2017-02-21 | End: 2017-02-22

## 2017-02-21 RX ORDER — DEXTROSE 50 % IN WATER 50 %
1 SYRINGE (ML) INTRAVENOUS ONCE
Qty: 0 | Refills: 0 | Status: DISCONTINUED | OUTPATIENT
Start: 2017-02-21 | End: 2017-02-22

## 2017-02-21 RX ORDER — SENNA PLUS 8.6 MG/1
1 TABLET ORAL AT BEDTIME
Qty: 0 | Refills: 0 | Status: DISCONTINUED | OUTPATIENT
Start: 2017-02-21 | End: 2017-02-24

## 2017-02-21 RX ORDER — INSULIN LISPRO 100/ML
2 VIAL (ML) SUBCUTANEOUS
Qty: 0 | Refills: 0 | Status: DISCONTINUED | OUTPATIENT
Start: 2017-02-21 | End: 2017-02-22

## 2017-02-21 RX ORDER — SODIUM CHLORIDE 9 MG/ML
1000 INJECTION, SOLUTION INTRAVENOUS
Qty: 0 | Refills: 0 | Status: DISCONTINUED | OUTPATIENT
Start: 2017-02-21 | End: 2017-02-22

## 2017-02-21 RX ORDER — INSULIN LISPRO 100/ML
VIAL (ML) SUBCUTANEOUS
Qty: 0 | Refills: 0 | Status: DISCONTINUED | OUTPATIENT
Start: 2017-02-21 | End: 2017-02-22

## 2017-02-21 RX ORDER — POTASSIUM CHLORIDE 20 MEQ
20 PACKET (EA) ORAL ONCE
Qty: 0 | Refills: 0 | Status: COMPLETED | OUTPATIENT
Start: 2017-02-21 | End: 2017-02-21

## 2017-02-21 RX ORDER — ACETAMINOPHEN 500 MG
1000 TABLET ORAL ONCE
Qty: 0 | Refills: 0 | Status: COMPLETED | OUTPATIENT
Start: 2017-02-21 | End: 2017-02-21

## 2017-02-21 RX ORDER — GLUCAGON INJECTION, SOLUTION 0.5 MG/.1ML
1 INJECTION, SOLUTION SUBCUTANEOUS ONCE
Qty: 0 | Refills: 0 | Status: DISCONTINUED | OUTPATIENT
Start: 2017-02-21 | End: 2017-02-22

## 2017-02-21 RX ADMIN — Medication 20 MILLIGRAM(S): at 07:30

## 2017-02-21 RX ADMIN — Medication 100 MILLIGRAM(S): at 08:12

## 2017-02-21 RX ADMIN — Medication 400 MILLIGRAM(S): at 01:10

## 2017-02-21 RX ADMIN — Medication 40 MILLIGRAM(S): at 14:27

## 2017-02-21 RX ADMIN — Medication 100 MILLIGRAM(S): at 14:27

## 2017-02-21 RX ADMIN — HEPARIN SODIUM 7500 UNIT(S): 5000 INJECTION INTRAVENOUS; SUBCUTANEOUS at 14:27

## 2017-02-21 RX ADMIN — Medication 100 MILLIGRAM(S): at 16:18

## 2017-02-21 RX ADMIN — Medication 100 MILLIEQUIVALENT(S): at 12:40

## 2017-02-21 RX ADMIN — CLOPIDOGREL BISULFATE 75 MILLIGRAM(S): 75 TABLET, FILM COATED ORAL at 12:41

## 2017-02-21 RX ADMIN — Medication 100 MILLIGRAM(S): at 00:12

## 2017-02-21 RX ADMIN — INSULIN GLARGINE 10 UNIT(S): 100 INJECTION, SOLUTION SUBCUTANEOUS at 07:52

## 2017-02-21 RX ADMIN — Medication 2 UNIT(S): at 16:18

## 2017-02-21 RX ADMIN — Medication 100 MILLIGRAM(S): at 21:58

## 2017-02-21 RX ADMIN — Medication 40 MILLIGRAM(S): at 12:40

## 2017-02-21 RX ADMIN — Medication 1000 MILLIGRAM(S): at 01:31

## 2017-02-21 RX ADMIN — ATORVASTATIN CALCIUM 40 MILLIGRAM(S): 80 TABLET, FILM COATED ORAL at 21:57

## 2017-02-21 RX ADMIN — SENNA PLUS 1 TABLET(S): 8.6 TABLET ORAL at 21:57

## 2017-02-21 RX ADMIN — Medication 1000 MILLIGRAM(S): at 08:08

## 2017-02-21 RX ADMIN — Medication 4: at 21:58

## 2017-02-21 RX ADMIN — Medication 81 MILLIGRAM(S): at 12:41

## 2017-02-21 RX ADMIN — Medication 125 MILLILITER(S): at 09:44

## 2017-02-21 RX ADMIN — Medication 125 MILLILITER(S): at 09:50

## 2017-02-21 RX ADMIN — Medication 12.5 MILLIGRAM(S): at 12:42

## 2017-02-21 RX ADMIN — Medication 12.5 MILLIGRAM(S): at 18:47

## 2017-02-21 RX ADMIN — Medication: at 11:00

## 2017-02-21 RX ADMIN — Medication 2 UNIT(S): at 11:01

## 2017-02-21 RX ADMIN — Medication 400 MILLIGRAM(S): at 07:43

## 2017-02-21 RX ADMIN — Medication 40 MILLIGRAM(S): at 18:47

## 2017-02-21 RX ADMIN — Medication 12.5 MILLIGRAM(S): at 06:18

## 2017-02-21 RX ADMIN — HEPARIN SODIUM 7500 UNIT(S): 5000 INJECTION INTRAVENOUS; SUBCUTANEOUS at 21:58

## 2017-02-21 RX ADMIN — Medication 2 UNIT(S): at 07:52

## 2017-02-21 RX ADMIN — Medication 40 MILLIGRAM(S): at 23:54

## 2017-02-21 RX ADMIN — HEPARIN SODIUM 7500 UNIT(S): 5000 INJECTION INTRAVENOUS; SUBCUTANEOUS at 06:18

## 2017-02-21 RX ADMIN — Medication: at 16:18

## 2017-02-21 RX ADMIN — FAMOTIDINE 20 MILLIGRAM(S): 10 INJECTION INTRAVENOUS at 12:41

## 2017-02-21 NOTE — DIETITIAN INITIAL EVALUATION ADULT. - OTHER INFO
65y/o M s/p CABG. Pt seen OOB in chair with visitor at bedside. Diet newly advanced this morning and pt received his first meal tray during assessment. Reports appetite is fair. Denies GI distress and mechanical issues. C/o sternal pain; RN aware. Attempted to discuss diet PTA and provide diet education, but pt refuses. A1c 7.6%; suspect poor-fair diet compliance. Will follow and monitor meal intake/tolerance and provide education as appropriate.

## 2017-02-21 NOTE — PROGRESS NOTE ADULT - ASSESSMENT
65yo s/p CABG X2, history of CKD slight bump in Cr post op.  Overall doing well    Problems   1. s/p CABG POD#1  2. CKD  3. H/o HTN    Plan   Neuro - pain control  CVS - ASA/Plavix.  BB/Statin  Pulm - Pulm toileting, IS/ambulation  GI - Diet, bowel regimen   - acute on chronic CKD, chambers, monitor UOP  ID - renally dosed periop antibiotics  Heme- anemia post op, currently asymptomatic.  DVT proph    Critical s/p CABG yesterday. Continue ICU monitoring.

## 2017-02-21 NOTE — PROGRESS NOTE ADULT - SUBJECTIVE AND OBJECTIVE BOX
POD #1 s/p CABG X2, EF nl     PMH :  HTN (hypertension)  HLD (hyperlipidemia)  DM type 2 (diabetes mellitus, type 2)  Coronary artery disease with other form of angina pectoris  Renal insufficiency    ROS : incisional pain   All other systems reviewed and negative.    ICU Vital Signs Last 24 Hrs  T(C): 37.3, Max: 37.4 (02-21 @ 01:00)  T(F): 99.2, Max: 99.3 (02-21 @ 01:00)  HR: 92 (74 - 96)  BP: 107/52 (83/48 - 110/54)  BP(mean): 75 (56 - 75)  ABP: 112/42 (98/44 - 146/60)  ABP(mean): 60 (60 - 80)  RR: 21 (4 - 23)  SpO2: 99% (94% - 100%)    I&O's Summary    I & Os for current day (as of 21 Feb 2017 07:45)  =============================================  IN: 2077.5 ml / OUT: 1805 ml / NET: 272.5 ml    ABG - ( 21 Feb 2017 03:24 )  pH: 7.40  /  pCO2: 34    /  pO2: 84    / HCO3: 21    / Base Excess: -3.3  /  SaO2: 96                      8.8    10.0  )-----------( 113      ( 21 Feb 2017 03:27 )             25.8     21 Feb 2017 03:29    143    |  109    |  26     ----------------------------<  110    4.2     |  22     |  1.71     Ca    8.3        21 Feb 2017 03:29  Phos  4.7       21 Feb 2017 03:29  Mg     2.2       21 Feb 2017 03:29    TPro  5.3    /  Alb  3.2    /  TBili  0.7    /  DBili  x      /  AST  47     /  ALT  31     /  AlkPhos  30     21 Feb 2017 03:29    PT/INR - ( 21 Feb 2017 03:27 )   PT: 13.3 sec;   INR: 1.20          PTT - ( 21 Feb 2017 03:27 )  PTT:29.1 sec  MEDICATIONS  (STANDING):  sodium chloride 0.45%. 1000milliLiter(s) IV Continuous <Continuous>  heparin  Injectable 7500Unit(s) SubCutaneous every 8 hours  ceFAZolin   IVPB 2000milliGRAM(s) IV Intermittent every 8 hours  clopidogrel Tablet 75milliGRAM(s) Oral daily  aspirin enteric coated 81milliGRAM(s) Oral daily  atorvastatin 40milliGRAM(s) Oral at bedtime  famotidine    Tablet 20milliGRAM(s) Oral daily  metoprolol 12.5milliGRAM(s) Oral every 6 hours  insulin glargine Injectable (LANTUS) 10Unit(s) SubCutaneous every morning  insulin lispro Injectable (HumaLOG) 2Unit(s) SubCutaneous three times a day before meals  insulin lispro (HumaLOG) corrective regimen sliding scale  SubCutaneous Before meals and at bedtime      PHYSICAL EXAM:  Gen : no acute distress  Respiratory: decreased in the bases  Cardiovascular: S1 and S2, RRR, no M/G/R  Gastrointestinal: BS+, soft, NT/ND  Extremities: No peripheral edema  Vascular: 2+ peripheral pulses  Neurological: A/O x 3, no focal deficits  Incision: clean dry/ no sign of infection  Lines: no sign of infection

## 2017-02-21 NOTE — PHYSICAL THERAPY INITIAL EVALUATION ADULT - CRITERIA FOR SKILLED THERAPEUTIC INTERVENTIONS
therapy frequency/functional limitations in following categories/rehab potential/impairments found/anticipated discharge recommendation

## 2017-02-21 NOTE — DIETITIAN INITIAL EVALUATION ADULT. - ENERGY NEEDS
IBW 70Kg  %%  BMI 35     Utilized IBW to calculate needs, pt >120% of IBW. Needs increased for post-op.

## 2017-02-21 NOTE — PHYSICAL THERAPY INITIAL EVALUATION ADULT - IMPAIRMENTS FOUND, PT EVAL
gait, locomotion, and balance/muscle strength/ventilation and respiration/gas exchange/aerobic capacity/endurance

## 2017-02-21 NOTE — PHYSICAL THERAPY INITIAL EVALUATION ADULT - GENERAL OBSERVATIONS, REHAB EVAL
Patient received sitting in chair complaints of sternal pain 4/10 +EKG +TLC +chambers +CT +temp PPM +O2 NC 3L +a-line. Patient left as found + RN Tiffanye aware +lines intact + call live

## 2017-02-21 NOTE — DIETITIAN INITIAL EVALUATION ADULT. - PERTINENT MEDS FT
plavix, heparin, ancef, D5, D50, dex gel, glucagon, SSI, lantus, humalog, lopressor, percocet, lipitor, pepcid

## 2017-02-22 LAB
ALBUMIN SERPL ELPH-MCNC: 3.3 G/DL — LOW (ref 3.4–5)
ALBUMIN SERPL ELPH-MCNC: 3.3 G/DL — LOW (ref 3.4–5)
ALP SERPL-CCNC: 35 U/L — LOW (ref 40–120)
ALP SERPL-CCNC: 47 U/L — SIGNIFICANT CHANGE UP (ref 40–120)
ALT FLD-CCNC: 17 U/L — SIGNIFICANT CHANGE UP (ref 12–42)
ALT FLD-CCNC: 21 U/L — SIGNIFICANT CHANGE UP (ref 12–42)
ANION GAP SERPL CALC-SCNC: 10 MMOL/L — SIGNIFICANT CHANGE UP (ref 9–16)
ANION GAP SERPL CALC-SCNC: 10 MMOL/L — SIGNIFICANT CHANGE UP (ref 9–16)
ANION GAP SERPL CALC-SCNC: 11 MMOL/L — SIGNIFICANT CHANGE UP (ref 9–16)
ANION GAP SERPL CALC-SCNC: 11 MMOL/L — SIGNIFICANT CHANGE UP (ref 9–16)
APTT BLD: 29.6 SEC — SIGNIFICANT CHANGE UP (ref 27.5–37.4)
AST SERPL-CCNC: 31 U/L — SIGNIFICANT CHANGE UP (ref 15–37)
AST SERPL-CCNC: 34 U/L — SIGNIFICANT CHANGE UP (ref 15–37)
BILIRUB SERPL-MCNC: 0.8 MG/DL — SIGNIFICANT CHANGE UP (ref 0.2–1.2)
BILIRUB SERPL-MCNC: 0.8 MG/DL — SIGNIFICANT CHANGE UP (ref 0.2–1.2)
BUN SERPL-MCNC: 38 MG/DL — HIGH (ref 7–23)
BUN SERPL-MCNC: 40 MG/DL — HIGH (ref 7–23)
BUN SERPL-MCNC: 40 MG/DL — HIGH (ref 7–23)
BUN SERPL-MCNC: 44 MG/DL — HIGH (ref 7–23)
CALCIUM SERPL-MCNC: 7.8 MG/DL — LOW (ref 8.5–10.5)
CALCIUM SERPL-MCNC: 8.1 MG/DL — LOW (ref 8.5–10.5)
CALCIUM SERPL-MCNC: 8.8 MG/DL — SIGNIFICANT CHANGE UP (ref 8.5–10.5)
CALCIUM SERPL-MCNC: 8.8 MG/DL — SIGNIFICANT CHANGE UP (ref 8.5–10.5)
CHLORIDE SERPL-SCNC: 102 MMOL/L — SIGNIFICANT CHANGE UP (ref 96–108)
CHLORIDE SERPL-SCNC: 103 MMOL/L — SIGNIFICANT CHANGE UP (ref 96–108)
CHLORIDE SERPL-SCNC: 97 MMOL/L — SIGNIFICANT CHANGE UP (ref 96–108)
CHLORIDE SERPL-SCNC: 98 MMOL/L — SIGNIFICANT CHANGE UP (ref 96–108)
CO2 SERPL-SCNC: 24 MMOL/L — SIGNIFICANT CHANGE UP (ref 22–31)
CO2 SERPL-SCNC: 26 MMOL/L — SIGNIFICANT CHANGE UP (ref 22–31)
CO2 SERPL-SCNC: 28 MMOL/L — SIGNIFICANT CHANGE UP (ref 22–31)
CO2 SERPL-SCNC: 28 MMOL/L — SIGNIFICANT CHANGE UP (ref 22–31)
CREAT SERPL-MCNC: 1.97 MG/DL — HIGH (ref 0.5–1.3)
CREAT SERPL-MCNC: 2.01 MG/DL — HIGH (ref 0.5–1.3)
CREAT SERPL-MCNC: 2.06 MG/DL — HIGH (ref 0.5–1.3)
CREAT SERPL-MCNC: 2.19 MG/DL — HIGH (ref 0.5–1.3)
GAS PNL BLDA: SIGNIFICANT CHANGE UP
GLUCOSE SERPL-MCNC: 156 MG/DL — HIGH (ref 70–99)
GLUCOSE SERPL-MCNC: 158 MG/DL — HIGH (ref 70–99)
GLUCOSE SERPL-MCNC: 190 MG/DL — HIGH (ref 70–99)
GLUCOSE SERPL-MCNC: 239 MG/DL — HIGH (ref 70–99)
HCT VFR BLD CALC: 27.3 % — LOW (ref 39–50)
HGB BLD-MCNC: 9.5 G/DL — LOW (ref 13–17)
INR BLD: 1.32 — HIGH (ref 0.88–1.16)
MAGNESIUM SERPL-MCNC: 1.9 MG/DL — SIGNIFICANT CHANGE UP (ref 1.6–2.4)
MAGNESIUM SERPL-MCNC: 2.3 MG/DL — SIGNIFICANT CHANGE UP (ref 1.6–2.4)
MAGNESIUM SERPL-MCNC: 2.3 MG/DL — SIGNIFICANT CHANGE UP (ref 1.6–2.4)
MCHC RBC-ENTMCNC: 31.4 PG — SIGNIFICANT CHANGE UP (ref 27–34)
MCHC RBC-ENTMCNC: 34.8 G/DL — SIGNIFICANT CHANGE UP (ref 32–36)
MCV RBC AUTO: 90.1 FL — SIGNIFICANT CHANGE UP (ref 80–100)
PHOSPHATE SERPL-MCNC: 4.6 MG/DL — HIGH (ref 2.5–4.5)
PLATELET # BLD AUTO: 103 K/UL — LOW (ref 150–400)
POTASSIUM SERPL-MCNC: 3.5 MMOL/L — SIGNIFICANT CHANGE UP (ref 3.5–5.3)
POTASSIUM SERPL-MCNC: 3.8 MMOL/L — SIGNIFICANT CHANGE UP (ref 3.5–5.3)
POTASSIUM SERPL-MCNC: 4.1 MMOL/L — SIGNIFICANT CHANGE UP (ref 3.5–5.3)
POTASSIUM SERPL-MCNC: 4.2 MMOL/L — SIGNIFICANT CHANGE UP (ref 3.5–5.3)
POTASSIUM SERPL-SCNC: 3.5 MMOL/L — SIGNIFICANT CHANGE UP (ref 3.5–5.3)
POTASSIUM SERPL-SCNC: 3.8 MMOL/L — SIGNIFICANT CHANGE UP (ref 3.5–5.3)
POTASSIUM SERPL-SCNC: 4.1 MMOL/L — SIGNIFICANT CHANGE UP (ref 3.5–5.3)
POTASSIUM SERPL-SCNC: 4.2 MMOL/L — SIGNIFICANT CHANGE UP (ref 3.5–5.3)
PROT SERPL-MCNC: 5.7 G/DL — LOW (ref 6.4–8.2)
PROT SERPL-MCNC: 5.8 G/DL — LOW (ref 6.4–8.2)
PROTHROM AB SERPL-ACNC: 14.7 SEC — HIGH (ref 10–13.1)
RBC # BLD: 3.03 M/UL — LOW (ref 4.2–5.8)
RBC # FLD: 15.2 % — SIGNIFICANT CHANGE UP (ref 10.3–16.9)
SODIUM SERPL-SCNC: 135 MMOL/L — SIGNIFICANT CHANGE UP (ref 135–145)
SODIUM SERPL-SCNC: 137 MMOL/L — SIGNIFICANT CHANGE UP (ref 135–145)
SODIUM SERPL-SCNC: 137 MMOL/L — SIGNIFICANT CHANGE UP (ref 135–145)
SODIUM SERPL-SCNC: 139 MMOL/L — SIGNIFICANT CHANGE UP (ref 135–145)
WBC # BLD: 10.6 K/UL — HIGH (ref 3.8–10.5)
WBC # FLD AUTO: 10.6 K/UL — HIGH (ref 3.8–10.5)

## 2017-02-22 PROCEDURE — 71010: CPT | Mod: 26

## 2017-02-22 PROCEDURE — 99232 SBSQ HOSP IP/OBS MODERATE 35: CPT

## 2017-02-22 PROCEDURE — 99291 CRITICAL CARE FIRST HOUR: CPT

## 2017-02-22 PROCEDURE — 93010 ELECTROCARDIOGRAM REPORT: CPT

## 2017-02-22 RX ORDER — METOPROLOL TARTRATE 50 MG
25 TABLET ORAL EVERY 8 HOURS
Qty: 0 | Refills: 0 | Status: DISCONTINUED | OUTPATIENT
Start: 2017-02-22 | End: 2017-02-24

## 2017-02-22 RX ORDER — AMIODARONE HYDROCHLORIDE 400 MG/1
150 TABLET ORAL ONCE
Qty: 0 | Refills: 0 | Status: DISCONTINUED | OUTPATIENT
Start: 2017-02-22 | End: 2017-02-22

## 2017-02-22 RX ORDER — GLUCAGON INJECTION, SOLUTION 0.5 MG/.1ML
1 INJECTION, SOLUTION SUBCUTANEOUS ONCE
Qty: 0 | Refills: 0 | Status: DISCONTINUED | OUTPATIENT
Start: 2017-02-22 | End: 2017-02-24

## 2017-02-22 RX ORDER — SODIUM CHLORIDE 9 MG/ML
1000 INJECTION, SOLUTION INTRAVENOUS
Qty: 0 | Refills: 0 | Status: DISCONTINUED | OUTPATIENT
Start: 2017-02-22 | End: 2017-02-24

## 2017-02-22 RX ORDER — AMIODARONE HYDROCHLORIDE 400 MG/1
400 TABLET ORAL EVERY 8 HOURS
Qty: 0 | Refills: 0 | Status: DISCONTINUED | OUTPATIENT
Start: 2017-02-22 | End: 2017-02-22

## 2017-02-22 RX ORDER — DEXTROSE 50 % IN WATER 50 %
12.5 SYRINGE (ML) INTRAVENOUS ONCE
Qty: 0 | Refills: 0 | Status: DISCONTINUED | OUTPATIENT
Start: 2017-02-22 | End: 2017-02-24

## 2017-02-22 RX ORDER — ACETAMINOPHEN 500 MG
1000 TABLET ORAL ONCE
Qty: 0 | Refills: 0 | Status: COMPLETED | OUTPATIENT
Start: 2017-02-22 | End: 2017-02-22

## 2017-02-22 RX ORDER — INSULIN GLARGINE 100 [IU]/ML
10 INJECTION, SOLUTION SUBCUTANEOUS EVERY MORNING
Qty: 0 | Refills: 0 | Status: DISCONTINUED | OUTPATIENT
Start: 2017-02-22 | End: 2017-02-24

## 2017-02-22 RX ORDER — CALCIUM GLUCONATE 100 MG/ML
2 VIAL (ML) INTRAVENOUS ONCE
Qty: 0 | Refills: 0 | Status: COMPLETED | OUTPATIENT
Start: 2017-02-22 | End: 2017-02-22

## 2017-02-22 RX ORDER — POTASSIUM CHLORIDE 20 MEQ
20 PACKET (EA) ORAL ONCE
Qty: 0 | Refills: 0 | Status: COMPLETED | OUTPATIENT
Start: 2017-02-22 | End: 2017-02-22

## 2017-02-22 RX ORDER — DEXTROSE 50 % IN WATER 50 %
25 SYRINGE (ML) INTRAVENOUS ONCE
Qty: 0 | Refills: 0 | Status: DISCONTINUED | OUTPATIENT
Start: 2017-02-22 | End: 2017-02-24

## 2017-02-22 RX ORDER — DEXTROSE 50 % IN WATER 50 %
1 SYRINGE (ML) INTRAVENOUS ONCE
Qty: 0 | Refills: 0 | Status: DISCONTINUED | OUTPATIENT
Start: 2017-02-22 | End: 2017-02-24

## 2017-02-22 RX ORDER — FUROSEMIDE 40 MG
20 TABLET ORAL ONCE
Qty: 0 | Refills: 0 | Status: COMPLETED | OUTPATIENT
Start: 2017-02-22 | End: 2017-02-22

## 2017-02-22 RX ORDER — FUROSEMIDE 40 MG
40 TABLET ORAL ONCE
Qty: 0 | Refills: 0 | Status: COMPLETED | OUTPATIENT
Start: 2017-02-22 | End: 2017-02-22

## 2017-02-22 RX ORDER — MAGNESIUM SULFATE 500 MG/ML
2 VIAL (ML) INJECTION ONCE
Qty: 0 | Refills: 0 | Status: COMPLETED | OUTPATIENT
Start: 2017-02-22 | End: 2017-02-22

## 2017-02-22 RX ORDER — POTASSIUM CHLORIDE 20 MEQ
40 PACKET (EA) ORAL ONCE
Qty: 0 | Refills: 0 | Status: COMPLETED | OUTPATIENT
Start: 2017-02-22 | End: 2017-02-22

## 2017-02-22 RX ORDER — INSULIN LISPRO 100/ML
4 VIAL (ML) SUBCUTANEOUS
Qty: 0 | Refills: 0 | Status: DISCONTINUED | OUTPATIENT
Start: 2017-02-22 | End: 2017-02-24

## 2017-02-22 RX ORDER — FUROSEMIDE 40 MG
20 TABLET ORAL ONCE
Qty: 0 | Refills: 0 | Status: DISCONTINUED | OUTPATIENT
Start: 2017-02-22 | End: 2017-02-22

## 2017-02-22 RX ORDER — POTASSIUM CHLORIDE 20 MEQ
10 PACKET (EA) ORAL ONCE
Qty: 0 | Refills: 0 | Status: COMPLETED | OUTPATIENT
Start: 2017-02-22 | End: 2017-02-22

## 2017-02-22 RX ORDER — INSULIN LISPRO 100/ML
VIAL (ML) SUBCUTANEOUS
Qty: 0 | Refills: 0 | Status: DISCONTINUED | OUTPATIENT
Start: 2017-02-22 | End: 2017-02-24

## 2017-02-22 RX ADMIN — Medication 100 MILLIGRAM(S): at 22:08

## 2017-02-22 RX ADMIN — Medication 2 UNIT(S): at 07:05

## 2017-02-22 RX ADMIN — Medication 40 MILLIEQUIVALENT(S): at 15:07

## 2017-02-22 RX ADMIN — Medication: at 12:39

## 2017-02-22 RX ADMIN — HEPARIN SODIUM 7500 UNIT(S): 5000 INJECTION INTRAVENOUS; SUBCUTANEOUS at 22:08

## 2017-02-22 RX ADMIN — FAMOTIDINE 20 MILLIGRAM(S): 10 INJECTION INTRAVENOUS at 12:46

## 2017-02-22 RX ADMIN — Medication 1000 MILLIGRAM(S): at 06:10

## 2017-02-22 RX ADMIN — INSULIN GLARGINE 10 UNIT(S): 100 INJECTION, SOLUTION SUBCUTANEOUS at 07:05

## 2017-02-22 RX ADMIN — Medication 12.5 MILLIGRAM(S): at 00:35

## 2017-02-22 RX ADMIN — Medication 400 MILLIGRAM(S): at 05:55

## 2017-02-22 RX ADMIN — Medication 100 MILLIGRAM(S): at 00:00

## 2017-02-22 RX ADMIN — Medication 20 MILLIGRAM(S): at 20:46

## 2017-02-22 RX ADMIN — Medication 25 MILLIGRAM(S): at 07:42

## 2017-02-22 RX ADMIN — SENNA PLUS 1 TABLET(S): 8.6 TABLET ORAL at 22:08

## 2017-02-22 RX ADMIN — Medication 50 MILLIEQUIVALENT(S): at 07:34

## 2017-02-22 RX ADMIN — Medication 100 MILLIEQUIVALENT(S): at 06:37

## 2017-02-22 RX ADMIN — Medication 40 MILLIGRAM(S): at 11:00

## 2017-02-22 RX ADMIN — Medication 81 MILLIGRAM(S): at 12:45

## 2017-02-22 RX ADMIN — Medication 25 MILLIGRAM(S): at 14:12

## 2017-02-22 RX ADMIN — HEPARIN SODIUM 7500 UNIT(S): 5000 INJECTION INTRAVENOUS; SUBCUTANEOUS at 14:12

## 2017-02-22 RX ADMIN — Medication 50 GRAM(S): at 04:49

## 2017-02-22 RX ADMIN — Medication 2 UNIT(S): at 12:39

## 2017-02-22 RX ADMIN — Medication 100 MILLIGRAM(S): at 14:31

## 2017-02-22 RX ADMIN — Medication: at 16:41

## 2017-02-22 RX ADMIN — HEPARIN SODIUM 7500 UNIT(S): 5000 INJECTION INTRAVENOUS; SUBCUTANEOUS at 06:37

## 2017-02-22 RX ADMIN — Medication 200 GRAM(S): at 06:30

## 2017-02-22 RX ADMIN — Medication 25 MILLIGRAM(S): at 22:08

## 2017-02-22 RX ADMIN — Medication 100 MILLIEQUIVALENT(S): at 04:51

## 2017-02-22 RX ADMIN — CLOPIDOGREL BISULFATE 75 MILLIGRAM(S): 75 TABLET, FILM COATED ORAL at 12:47

## 2017-02-22 RX ADMIN — Medication 100 MILLIGRAM(S): at 06:37

## 2017-02-22 RX ADMIN — Medication 2 UNIT(S): at 16:42

## 2017-02-22 RX ADMIN — ATORVASTATIN CALCIUM 40 MILLIGRAM(S): 80 TABLET, FILM COATED ORAL at 22:08

## 2017-02-22 NOTE — CONSULT NOTE ADULT - ATTENDING COMMENTS
Spoke to his wife and [id number] Had been taking ACTOS and Glipizide at home HbA1c 7.6% Advised to consider stopping both drugs and substituting with metformin and then if needed Januvia 50mg as well

## 2017-02-22 NOTE — PROGRESS NOTE ADULT - SUBJECTIVE AND OBJECTIVE BOX
INTERVAL HPI: oob chair no cp sob palp  	  MEDICATIONS:  metoprolol 25milliGRAM(s) Oral every 8 hours        oxyCODONE  5 mG/acetaminophen 325 mG 1Tablet(s) Oral every 6 hours PRN    famotidine    Tablet 20milliGRAM(s) Oral daily  docusate sodium 100milliGRAM(s) Oral three times a day  senna 1Tablet(s) Oral at bedtime    atorvastatin 40milliGRAM(s) Oral at bedtime  insulin glargine Injectable (LANTUS) 10Unit(s) SubCutaneous every morning  insulin lispro Injectable (HumaLOG) 2Unit(s) SubCutaneous three times a day before meals  insulin lispro (HumaLOG) corrective regimen sliding scale  SubCutaneous Before meals and at bedtime  dextrose Gel 1Dose(s) Oral once PRN  dextrose 50% Injectable 12.5Gram(s) IV Push once  dextrose 50% Injectable 25Gram(s) IV Push once  dextrose 50% Injectable 25Gram(s) IV Push once  glucagon  Injectable 1milliGRAM(s) IntraMuscular once PRN    sodium chloride 0.45%. 1000milliLiter(s) IV Continuous <Continuous>  heparin  Injectable 7500Unit(s) SubCutaneous every 8 hours  clopidogrel Tablet 75milliGRAM(s) Oral daily  aspirin enteric coated 81milliGRAM(s) Oral daily  dextrose 5%. 1000milliLiter(s) IV Continuous <Continuous>      REVIEW OF SYSTEMS:  [x] As per HPI  CONSTITUTIONAL: No fever, weight loss, or fatigue  RESPIRATORY: No cough, wheezing, chills or hemoptysis; No Shortness of Breath  CARDIOVASCULAR: No chest pain, palpitations, dizziness, or leg swelling  GASTROINTESTINAL: No abdominal or epigastric pain. No nausea, vomiting, or hematemesis; No diarrhea or constipation. No melena or hematochezia.  MUSCULOSKELETAL: No joint pain or swelling; No muscle, back, or extremity pain  [x] All others negative	  [ ] Unable to obtain    PHYSICAL EXAM:  T(C): 36.7, Max: 37.5 (02-22 @ 09:58)  HR: 86 (77 - 98)  BP: 107/51 (107/51 - 135/67)  RR: 19 (13 - 25)  SpO2: 95% (92% - 97%)  Wt(kg): --  I&O's Summary  I & Os for 24h ending 22 Feb 2017 07:00  =============================================  IN: 4070 ml / OUT: 3274 ml / NET: 796 ml    I & Os for current day (as of 22 Feb 2017 17:50)  =============================================  IN: 1250 ml / OUT: 1577 ml / NET: -327 ml        Appearance: Normal	  HEENT:   Normal oral mucosa  Cardiovascular: Normal S1 S2, No JVD, No murmurs, No edema  Respiratory: Lungs clear to auscultation	  Gastrointestinal:  Soft, Non-tender, + BS	  Extremities: Normal range of motion, No clubbing, cyanosis or edema  Vascular: Peripheral pulses palpable 2+ bilaterally    TELEMETRY: 	    ECG:    	  RADIOLOGY:   CXR:  CT:  US:    CARDIAC TESTING:  Echocardiogram:  Catheterization:  Stress Test:      LABS:	 	    CARDIAC MARKERS:                                  9.5    10.6  )-----------( 103      ( 22 Feb 2017 02:53 )             27.3     22 Feb 2017 17:09    137    |  98     |  44     ----------------------------<  190    4.1     |  28     |  1.97     Ca    8.8        22 Feb 2017 17:09  Phos  4.6       22 Feb 2017 02:33  Mg     2.3       22 Feb 2017 17:09    TPro  5.8    /  Alb  3.3    /  TBili  0.8    /  DBili  x      /  AST  31     /  ALT  17     /  AlkPhos  47     22 Feb 2017 08:37    proBNP:   Lipid Profile:   HgA1c:   TSH:     ASSESSMENT/PLAN: 	  s/p cabg- creatinine increasing-off pressors, cont beta blockade, monitor renal function

## 2017-02-22 NOTE — CONSULT NOTE ADULT - SUBJECTIVE AND OBJECTIVE BOX
HPI: 65 yo Male with hx of CAD, DM presenting for finding abnormal stress test and now s/p CABG POD #2.  Pt had hx of well controlled DM, was started on ISF-50 on admission, and was well controlled through the day of his surgery.  Following surgery, he was started on an insulin gtt for 13 hours.  Insulin gtt was stopped when pt's FSG was 89.  Four hours later, he was given 10 units of lantus and 2 units of lispro insulin, and was continued on a regimen of 10units of lantus every morning, 2 units of lispro-pre meal and ISF-25.  In this setting, his FSG yesterday were 130, 211, 262, and 201.      DM hx:  Age at Dx:  How dx:  Hx and duration of insulin:  Current Therapy:  Hx of hypoglycemia  Hx of DKA/HHS?    Home FSG:  Fasting  Lunch  Dinner  Bed    Hx of other regimens  Complications:  Outpatient Endo:    PMH & Surgical Hx:  CAD  HTN (hypertension)  HLD (hyperlipidemia)  DM type 2 (diabetes mellitus, type 2)  Renal insufficiency  CABG    FH:  DM:  Thyroid:  Autoimmune:  Other:    SH:  Smoking  Etoh:  Recreational Drugs:  Social Life:    Home Meds:  glipizide 5mg XL once daily  actos 30mg once daily    Current Meds:  sodium chloride 0.45%. 1000milliLiter(s) IV Continuous <Continuous>  heparin  Injectable 7500Unit(s) SubCutaneous every 8 hours  clopidogrel Tablet 75milliGRAM(s) Oral daily  aspirin enteric coated 81milliGRAM(s) Oral daily  atorvastatin 40milliGRAM(s) Oral at bedtime  famotidine    Tablet 20milliGRAM(s) Oral daily  insulin glargine Injectable (LANTUS) 10Unit(s) SubCutaneous every morning  insulin lispro Injectable (HumaLOG) 2Unit(s) SubCutaneous three times a day before meals  insulin lispro (HumaLOG) corrective regimen sliding scale  SubCutaneous Before meals and at bedtime  dextrose 5%. 1000milliLiter(s) IV Continuous <Continuous>  dextrose Gel 1Dose(s) Oral once PRN  dextrose 50% Injectable 12.5Gram(s) IV Push once  dextrose 50% Injectable 25Gram(s) IV Push once  dextrose 50% Injectable 25Gram(s) IV Push once  glucagon  Injectable 1milliGRAM(s) IntraMuscular once PRN  oxyCODONE  5 mG/acetaminophen 325 mG 1Tablet(s) Oral every 6 hours PRN  docusate sodium 100milliGRAM(s) Oral three times a day  senna 1Tablet(s) Oral at bedtime  metoprolol 25milliGRAM(s) Oral every 8 hours      Allergies:  No Known Allergies      ROS:  Denies the following except as indicated.    General: weight loss/weight gain, decreased appetite, fatigue  Eyes: Blurry vision, double vision, visual changes  ENT: Throat pain, changes in voice,   CV: palpitations, SOB, CP, cough  GI: NVD, difficulty swallowing, abdominal pain  : polyuria, dysuria  Endo: abnormal menses, temperature intolerance, decreased libido  MSK: weakness, joint pain  Skin: rash, dryness, diaphoresis  Heme: Easy bruising,bleeding  Neuro: HA, dizziness, lightheadedness, numbness tingling  Psych: Anxiety, Depression    CAPILLARY BLOOD GLUCOSE  138 (22 Feb 2017 06:00)  201 (21 Feb 2017 22:00)        Height (cm): 172.7 (02-17 @ 13:11)  Weight (kg): 104.3 (02-16 @ 13:04)  BMI (kg/m2): 35 (02-17 @ 13:11)    Vital Signs Last 24 Hrs  T(C): 36.6, Max: 37.5 (02-22 @ 09:58)  T(F): 97.8, Max: 99.5 (02-22 @ 09:58)  HR: 86 (76 - 93)  BP: 135/67 (115/55 - 135/67)  BP(mean): 88 (74 - 88)  RR: 21 (13 - 24)  SpO2: 95% (92% - 97%)  Constitutional: wn/wd in NAD.   HEENT: NCAT, MMM, OP clear, EOMI, , no proptosis or lid retraction  Neck: no thyromegaly or palpable thyroid nodules   Respiratory: lungs CTAB.  Cardiovascular: regular rhythm, normal S1 and S2, no audible murmurs, no peripheral edema  GI: soft, NT/ND, no masses/HSM appreciated.  Neurology: no tremors, DTR 2+  Skin: no visible rashes/lesions  Psychiatric: AAO x 3, normal affect/mood.  Ext: radial pulses intact, DP pulses intact, extremities warm, no cyanosis, clubbing or edema.       LABS:                        9.5    10.6  )-----------( 103      ( 22 Feb 2017 02:53 )             27.3     22 Feb 2017 08:37    137    |  103    |  38     ----------------------------<  158    4.2     |  24     |  2.06     Ca    8.1        22 Feb 2017 08:37  Phos  4.6       22 Feb 2017 02:33  Mg     1.9       22 Feb 2017 02:33    TPro  5.8    /  Alb  3.3    /  TBili  0.8    /  DBili  x      /  AST  31     /  ALT  17     /  AlkPhos  47     22 Feb 2017 08:37    PT/INR - ( 22 Feb 2017 02:34 )   PT: 14.7 sec;   INR: 1.32          PTT - ( 22 Feb 2017 02:34 )  PTT:29.6 sec    Hemoglobin A1C, Whole Blood: 7.6 (02-17 @ 07:13)    Thyroid Stimulating Hormone, Serum: 2.021 (02-17 @ 13:05)    RADIOLOGY & ADDITIONAL STUDIES:    A/P:66yMale with history of CAD, DM, presenting for abnormal stress test and now s/p CABG POD #2 with new hyperglycemia.      1.  DM - type 2, well controlled, complicated.  Pt's hyperglycemia likely reflective of post-operative stress and pancreatic suppression from insulin gtt.    Would continue 10 units lantus in the morning and can increase pre-meal lispro to 4 units before each meal.  Please continue moderate dose sliding scale four times daily with meals and at bedtime.  Please obtain nutrition consult, please ensure pt is comfortable with injecting insulin and has diabetic supplies including glucometer, lancets, alcohol pads and test strips.      Pt's fasting glucose and pre-meal goal is 100-180    Will continue to monitor     For discharge, if pt's EUGENIE resolves, he can start metformin 500mg once daily and can increase after one week to twice daily.  He can continue glipizide as well, but should discontinue actos.      Pt can follow up at discharge with [facility] by calling  to make an appointment.   Will discuss case with Dr. Dodson   and update primary team HPI: 65 yo Male with hx of CAD, DM presenting for finding abnormal stress test and now s/p CABG POD #2.  Pt had hx of well controlled DM, was started on ISF-50 on admission, and was well controlled through the day of his surgery.  Following surgery, he was started on an insulin gtt for 13 hours.  Insulin gtt was stopped when pt's FSG was 89.  Four hours later, he was given 10 units of lantus and 2 units of lispro insulin, and was continued on a regimen of 10units of lantus every morning, 2 units of lispro-pre meal and ISF-25.  In this setting, his FSG yesterday were 130, 211, 262, and 201.      DM hx:   Age at Dx:  20+ years  How dx: routine blood work  Hx and duration of insulin: none  Current Therapy: actos 30mg once daily, glipizide 5mg XL  Hx of hypoglycemia: none  Hx of DKA/HHS? none    Home FSG: twice/day    Hx of other regimens  Complications: no microvascular  Outpatient Endo: [facility]    PMH & Surgical Hx:  CAD  HTN (hypertension)  HLD (hyperlipidemia)  DM type 2 (diabetes mellitus, type 2)  Renal insufficiency  CABG    FH:  DM: yes  Thyroid: yes  Autoimmune: no    SH:  none  Smoking: none  Etoh:  Recreational Drugs:  Social Life:    Home Meds:  glipizide 5mg XL once daily  actos 30mg once daily    Current Meds:  sodium chloride 0.45%. 1000milliLiter(s) IV Continuous <Continuous>  heparin  Injectable 7500Unit(s) SubCutaneous every 8 hours  clopidogrel Tablet 75milliGRAM(s) Oral daily  aspirin enteric coated 81milliGRAM(s) Oral daily  atorvastatin 40milliGRAM(s) Oral at bedtime  famotidine    Tablet 20milliGRAM(s) Oral daily  insulin glargine Injectable (LANTUS) 10Unit(s) SubCutaneous every morning  insulin lispro Injectable (HumaLOG) 2Unit(s) SubCutaneous three times a day before meals  insulin lispro (HumaLOG) corrective regimen sliding scale  SubCutaneous Before meals and at bedtime  dextrose 5%. 1000milliLiter(s) IV Continuous <Continuous>  dextrose Gel 1Dose(s) Oral once PRN  dextrose 50% Injectable 12.5Gram(s) IV Push once  dextrose 50% Injectable 25Gram(s) IV Push once  dextrose 50% Injectable 25Gram(s) IV Push once  glucagon  Injectable 1milliGRAM(s) IntraMuscular once PRN  oxyCODONE  5 mG/acetaminophen 325 mG 1Tablet(s) Oral every 6 hours PRN  docusate sodium 100milliGRAM(s) Oral three times a day  senna 1Tablet(s) Oral at bedtime  metoprolol 25milliGRAM(s) Oral every 8 hours      Allergies:  No Known Allergies      ROS:  Denies the following except as indicated.    General: weight loss/weight gain, decreased appetite, fatigue  Eyes: Blurry vision, double vision, visual changes  ENT: Throat pain, changes in voice,   CV: palpitations, SOB, CP, cough  GI: NVD, difficulty swallowing, abdominal pain  : polyuria, dysuria  Endo: abnormal menses, temperature intolerance, decreased libido  MSK: weakness, joint pain  Skin: rash, dryness, diaphoresis  Heme: Easy bruising,bleeding  Neuro: HA, dizziness, lightheadedness, numbness tingling  Psych: Anxiety, Depression    CAPILLARY BLOOD GLUCOSE  138 (22 Feb 2017 06:00)  201 (21 Feb 2017 22:00)        Height (cm): 172.7 (02-17 @ 13:11)  Weight (kg): 104.3 (02-16 @ 13:04)  BMI (kg/m2): 35 (02-17 @ 13:11)    Vital Signs Last 24 Hrs  T(C): 36.6, Max: 37.5 (02-22 @ 09:58)  T(F): 97.8, Max: 99.5 (02-22 @ 09:58)  HR: 86 (76 - 93)  BP: 135/67 (115/55 - 135/67)  BP(mean): 88 (74 - 88)  RR: 21 (13 - 24)  SpO2: 95% (92% - 97%)  Constitutional: wn/wd in NAD.   HEENT: NCAT, MMM, OP clear, EOMI, , no proptosis or lid retraction  Neck: no thyromegaly or palpable thyroid nodules   Respiratory: lungs CTAB.  Cardiovascular: regular rhythm, normal S1 and S2, no audible murmurs, no peripheral edema  GI: soft, NT/ND, no masses/HSM appreciated.  Neurology: no tremors, DTR 2+  Skin: no visible rashes/lesions  Psychiatric: AAO x 3, normal affect/mood.  Ext: radial pulses intact, DP pulses intact, extremities warm, no cyanosis, clubbing or edema.       LABS:                        9.5    10.6  )-----------( 103      ( 22 Feb 2017 02:53 )             27.3     22 Feb 2017 08:37    137    |  103    |  38     ----------------------------<  158    4.2     |  24     |  2.06     Ca    8.1        22 Feb 2017 08:37  Phos  4.6       22 Feb 2017 02:33  Mg     1.9       22 Feb 2017 02:33    TPro  5.8    /  Alb  3.3    /  TBili  0.8    /  DBili  x      /  AST  31     /  ALT  17     /  AlkPhos  47     22 Feb 2017 08:37    PT/INR - ( 22 Feb 2017 02:34 )   PT: 14.7 sec;   INR: 1.32          PTT - ( 22 Feb 2017 02:34 )  PTT:29.6 sec    Hemoglobin A1C, Whole Blood: 7.6 (02-17 @ 07:13)    Thyroid Stimulating Hormone, Serum: 2.021 (02-17 @ 13:05)    RADIOLOGY & ADDITIONAL STUDIES:    A/P:66yMale with history of CAD, DM, presenting for abnormal stress test and now s/p CABG POD #2 with new hyperglycemia.      1.  DM - type 2, well controlled, complicated.  Pt's hyperglycemia likely reflective of post-operative stress and pancreatic suppression from insulin gtt.    Would continue 10 units lantus in the morning and can increase pre-meal lispro to 4 units before each meal.  Please continue moderate dose sliding scale four times daily with meals and at bedtime.  Please obtain nutrition consult, please ensure pt is comfortable with injecting insulin and has diabetic supplies including glucometer, lancets, alcohol pads and test strips.      Pt's fasting glucose and pre-meal goal is 100-180    Will continue to monitor     For discharge, if pt's EUGENIE resolves, he can start metformin 500mg once daily and can increase after one week to twice daily.  He can continue glipizide as well, but should discontinue actos.      Pt can follow up at discharge with [facility] by calling  to make an appointment.   Will discuss case with Dr. Dodson   and update primary team

## 2017-02-22 NOTE — PROGRESS NOTE ADULT - SUBJECTIVE AND OBJECTIVE BOX
CTICU  CRITICAL  CARE  attending     Hand off received 					   Pertinent clinical, laboratory, radiographic, hemodynamic, echocardiographic, respiratory data, microbiologic data and chart were reviewed and analyzed frequently throughout the course of the day and night  Patient seen and examined with CTS/ SH attending at bedside    Pt is a 66y , Male POD # 2 s/p CABG x 2V  EF nl  CAD (coronary artery disease): CAD (coronary artery disease)  Renal insufficiency: Renal insufficiency  DM type 2 (diabetes mellitus, type 2): DM type 2 (diabetes mellitus, type 2)  HLD (hyperlipidemia): HLD (hyperlipidemia)  HTN (hypertension): HTN (hypertension)  Abnormal stress test: Abnormal stress test      , FAMILY HISTORY:  No pertinent family history in first degree relatives  PAST MEDICAL & SURGICAL HISTORY:  HTN (hypertension)  HLD (hyperlipidemia)  DM type 2 (diabetes mellitus, type 2)  H/O heart artery stent    Patient is a 66y old  Male who presents with a chief complaint of Went for a routine stress test and results were positive. Patient denies any symptoms prior to checkup/est. (2017 21:52)      14 system review was unremarkable  acute changes include acute respiratory failure  Vital signs, hemodynamic and respiratory parameters were reviewed from the bedside nursing flowsheet.  ICU Vital Signs Last 24 Hrs  T(C): 36.6, Max: 37.5 (- @ 09:58)  T(F): 97.8, Max: 99.5 ( @ 09:58)  HR: 82 (77 - 98)  BP: 131/57 (115/55 - 135/67)  BP(mean): 72 (72 - 88)  ABP: 144/54 (117/47 - 150/56)  ABP(mean): 76 (64 - 84)  RR: 14 (13 - 25)  SpO2: 97% (92% - 97%)    Adult Advanced Hemodynamics Last 24 Hrs  CVP(mm Hg): 6 (6 - 11)  CVP(cm H2O): --  CO: --  CI: --  PA: --  PA(mean): --  PCWP: --  SVR: --  SVRI: --  PVR: --  PVRI: --, ABG - ( 2017 06:06 )  pH: 7.47  /  pCO2: 30    /  pO2: 70    / HCO3: 22    / Base Excess: -1.3  /  SaO2: 94                  Intake and output was reviewed and the fluid balance was calculated  Daily     Daily Weight in k.6 (2017 05:00)  I&O's Summary  I & Os for 24h ending 2017 07:00  =============================================  IN: 4070 ml / OUT: 3274 ml / NET: 796 ml    I & Os for current day (as of 2017 15:06)  =============================================  IN: 710 ml / OUT: 1252 ml / NET: -542 ml      All lines and drain sites were assessed  Glycemic trend was reviewedCAPILLARY BLOOD GLUCOSE  138 (2017 06:00)    No acute change in mental status  Auscultation of the chest reveals equal bs  Abdomen is soft  Extremities are warm and well perfused  Wounds appear clean and unremarkable  Antibiotics are periop    labs  CBC Full  -  ( 2017 02:53 )  WBC Count : 10.6 K/uL  Hemoglobin : 9.5 g/dL  Hematocrit : 27.3 %  Platelet Count - Automated : 103 K/uL  Mean Cell Volume : 90.1 fL  Mean Cell Hemoglobin : 31.4 pg  Mean Cell Hemoglobin Concentration : 34.8 g/dL  Auto Neutrophil # : x  Auto Lymphocyte # : x  Auto Monocyte # : x  Auto Eosinophil # : x  Auto Basophil # : x  Auto Neutrophil % : x  Auto Lymphocyte % : x  Auto Monocyte % : x  Auto Eosinophil % : x  Auto Basophil % : x    2017 13:36    135    |  97     |  40     ----------------------------<  239    3.8     |  28     |  2.01     Ca    8.8        2017 13:36  Phos  4.6       2017 02:33  Mg     2.3       2017 13:36    TPro  5.8    /  Alb  3.3    /  TBili  0.8    /  DBili  x      /  AST  31     /  ALT  17     /  AlkPhos  47     2017 08:37    PT/INR - ( 2017 02:34 )   PT: 14.7 sec;   INR: 1.32          PTT - ( 2017 02:34 )  PTT:29.6 sec  The current medications were reviewed   MEDICATIONS  (STANDING):  sodium chloride 0.45%. 1000milliLiter(s) IV Continuous <Continuous>  heparin  Injectable 7500Unit(s) SubCutaneous every 8 hours  clopidogrel Tablet 75milliGRAM(s) Oral daily  aspirin enteric coated 81milliGRAM(s) Oral daily  atorvastatin 40milliGRAM(s) Oral at bedtime  famotidine    Tablet 20milliGRAM(s) Oral daily  insulin glargine Injectable (LANTUS) 10Unit(s) SubCutaneous every morning  insulin lispro Injectable (HumaLOG) 2Unit(s) SubCutaneous three times a day before meals  insulin lispro (HumaLOG) corrective regimen sliding scale  SubCutaneous Before meals and at bedtime  dextrose 5%. 1000milliLiter(s) IV Continuous <Continuous>  dextrose 50% Injectable 12.5Gram(s) IV Push once  dextrose 50% Injectable 25Gram(s) IV Push once  dextrose 50% Injectable 25Gram(s) IV Push once  docusate sodium 100milliGRAM(s) Oral three times a day  senna 1Tablet(s) Oral at bedtime  metoprolol 25milliGRAM(s) Oral every 8 hours  potassium chloride    Tablet ER 40milliEquivalent(s) Oral once    MEDICATIONS  (PRN):  dextrose Gel 1Dose(s) Oral once PRN Blood Glucose LESS THAN 70 milliGRAM(s)/deciliter  glucagon  Injectable 1milliGRAM(s) IntraMuscular once PRN Glucose LESS THAN 70 milligrams/deciliter  oxyCODONE  5 mG/acetaminophen 325 mG 1Tablet(s) Oral every 6 hours PRN Moderate Pain (4 - 6)      PROBLEM LIST/ ASSESSMENT:  HEALTH ISSUES - PROBLEM Dx:  CAD (coronary artery disease): CAD (coronary artery disease)  Renal insufficiency: Renal insufficiency  DM type 2 (diabetes mellitus, type 2): DM type 2 (diabetes mellitus, type 2)  HLD (hyperlipidemia): HLD (hyperlipidemia)  HTN (hypertension): HTN (hypertension)  Abnormal stress test: Abnormal stress test      ,   Patient is a 66y old  Male who presents with a chief complaint of Went for a routine stress test and results were positive. Patient denies any symptoms prior to checkup/est. (2017 21:52)     s/p   acute changes include acute respiratory failure    My plan includes :  close hemodynamic, ventilatory and drain monitoring and management per post op routine    Monitor for arrhythmias and monitor parameters for organ perfusion  monitor neurologic status  Head of the bed should remain elevated to 45 deg .   chest PT and IS will be encouraged  monitor adequacy of oxygenation and ventilation and attempt to wean oxygen  Nutritional goals will be met using po eventually , ensure adequate caloric intake and montior the same  Stress ulcer and VTE prophylaxis will be achieved    Glycemic control is satisfactory  Electrolytes have been repleted as necessary and wound care has been carried out. Pain control has been achieved.   agressive physical therapy and early mobility and ambulation goals will be met   The family was updated about the course and plan  CRITICAL CARE TIME SPENT in evaluation and management, reassessments, review and interpretation of labs and x-rays, ventilator and hemodynamic management, formulating a plan and coordinating care: ___90____ MIN.  Time does not include procedural time.  CTICU ATTENDING     					    Adalid Feng MD CTICU  CRITICAL  CARE  attending     Hand off received 					   Pertinent clinical, laboratory, radiographic, hemodynamic, echocardiographic, respiratory data, microbiologic data and chart were reviewed and analyzed frequently throughout the course of the day and night  Patient seen and examined with CTS/ SH attending at bedside    Pt is a 66y , Male POD # 2 s/p CABG x 2V  EF nl    Post op :  EUGENIE (peak Cr 2.36)  Poor resp effort  still requiring supplemental O2  fluid overload    Currently:    S Cr on its way down  Brisk response to IV lasix  Better with incentive spirometry      CAD (coronary artery disease): CAD (coronary artery disease)  Renal insufficiency: Renal insufficiency  DM type 2 (diabetes mellitus, type 2): DM type 2 (diabetes mellitus, type 2)  HLD (hyperlipidemia): HLD (hyperlipidemia)  HTN (hypertension): HTN (hypertension)  Abnormal stress test: Abnormal stress test      FAMILY HISTORY:  No pertinent family history in first degree relatives  PAST MEDICAL & SURGICAL HISTORY:  HTN (hypertension)  HLD (hyperlipidemia)  DM type 2 (diabetes mellitus, type 2)  H/O heart artery stent    Patient is a 66y old  Male who is POD # 2 s/p CABG x 2V    14 system review was unremarkable  acute changes include acute respiratory failure  Vital signs, hemodynamic and respiratory parameters were reviewed from the bedside nursing flowsheet.  ICU Vital Signs Last 24 Hrs  T(C): 36.6, Max: 37.5 (02- @ 09:58)  T(F): 97.8, Max: 99.5 (02- @ 09:58)  HR: 82 (77 - 98)  BP: 131/57 (115/55 - 135/67)  BP(mean): 72 (72 - 88)  ABP: 144/54 (117/47 - 150/56)  ABP(mean): 76 (64 - 84)  RR: 14 (13 - 25)  SpO2: 97% (92% - 97%)    Adult Advanced Hemodynamics Last 24 Hrs  CVP(mm Hg): 6 (6 - 11)  CVP(cm H2O): --  CO: --  CI: --  PA: --  PA(mean): --  PCWP: --  SVR: --  SVRI: --  PVR: --  PVRI: --, ABG - ( 2017 06:06 )  pH: 7.47  /  pCO2: 30    /  pO2: 70    / HCO3: 22    / Base Excess: -1.3  /  SaO2: 94          Intake and output was reviewed and the fluid balance was calculated  Daily     Daily Weight in k.6 (2017 05:00)  I&O's Summary  I & Os for 24h ending 2017 07:00  =============================================  IN: 4070 ml / OUT: 3274 ml / NET: 796 ml    I & Os for current day (as of 2017 15:06)  =============================================  IN: 710 ml / OUT: 1252 ml / NET: -542 ml      All lines and drain sites were assessed  Glycemic trend was reviewedCAPILLARY BLOOD GLUCOSE  138 (2017 06:00)    No acute change in mental status  Auscultation of the chest reveals equal bs  Abdomen is soft  Extremities are warm and well perfused  Wounds appear clean and unremarkable  Antibiotics are periop    labs  CBC Full  -  ( 2017 02:53 )  WBC Count : 10.6 K/uL  Hemoglobin : 9.5 g/dL  Hematocrit : 27.3 %  Platelet Count - Automated : 103 K/uL  Mean Cell Volume : 90.1 fL  Mean Cell Hemoglobin : 31.4 pg  Mean Cell Hemoglobin Concentration : 34.8 g/dL  Auto Neutrophil # : x  Auto Lymphocyte # : x  Auto Monocyte # : x  Auto Eosinophil # : x  Auto Basophil # : x  Auto Neutrophil % : x  Auto Lymphocyte % : x  Auto Monocyte % : x  Auto Eosinophil % : x  Auto Basophil % : x    2017 13:36    135    |  97     |  40     ----------------------------<  239    3.8     |  28     |  2.01     Ca    8.8        2017 13:36  Phos  4.6       2017 02:33  Mg     2.3       2017 13:36    TPro  5.8    /  Alb  3.3    /  TBili  0.8    /  DBili  x      /  AST  31     /  ALT  17     /  AlkPhos  47     2017 08:37    PT/INR - ( 2017 02:34 )   PT: 14.7 sec;   INR: 1.32          PTT - ( 2017 02:34 )  PTT:29.6 sec  The current medications were reviewed   MEDICATIONS  (STANDING):  sodium chloride 0.45%. 1000milliLiter(s) IV Continuous <Continuous>  heparin  Injectable 7500Unit(s) SubCutaneous every 8 hours  clopidogrel Tablet 75milliGRAM(s) Oral daily  aspirin enteric coated 81milliGRAM(s) Oral daily  atorvastatin 40milliGRAM(s) Oral at bedtime  famotidine    Tablet 20milliGRAM(s) Oral daily  insulin glargine Injectable (LANTUS) 10Unit(s) SubCutaneous every morning  insulin lispro Injectable (HumaLOG) 2Unit(s) SubCutaneous three times a day before meals  insulin lispro (HumaLOG) corrective regimen sliding scale  SubCutaneous Before meals and at bedtime  dextrose 5%. 1000milliLiter(s) IV Continuous <Continuous>  dextrose 50% Injectable 12.5Gram(s) IV Push once  dextrose 50% Injectable 25Gram(s) IV Push once  dextrose 50% Injectable 25Gram(s) IV Push once  docusate sodium 100milliGRAM(s) Oral three times a day  senna 1Tablet(s) Oral at bedtime  metoprolol 25milliGRAM(s) Oral every 8 hours  potassium chloride    Tablet ER 40milliEquivalent(s) Oral once    MEDICATIONS  (PRN):  dextrose Gel 1Dose(s) Oral once PRN Blood Glucose LESS THAN 70 milliGRAM(s)/deciliter  glucagon  Injectable 1milliGRAM(s) IntraMuscular once PRN Glucose LESS THAN 70 milligrams/deciliter  oxyCODONE  5 mG/acetaminophen 325 mG 1Tablet(s) Oral every 6 hours PRN Moderate Pain (4 - 6)      PROBLEM LIST/ ASSESSMENT:  HEALTH ISSUES - PROBLEM Dx:  CAD (coronary artery disease): CAD (coronary artery disease)  Renal insufficiency: Renal insufficiency  DM type 2 (diabetes mellitus, type 2): DM type 2 (diabetes mellitus, type 2)  HLD (hyperlipidemia): HLD (hyperlipidemia)  HTN (hypertension): HTN (hypertension)  Abnormal stress test: Abnormal stress test      ,   Patient is a 66y old  Male who is POD # 2 s/p CABG x 2V      My plan includes :  close hemodynamic, ventilatory and drain monitoring and management per post op routine  Monitor for arrhythmias and monitor parameters for organ perfusion  monitor neurologic status  Head of the bed should remain elevated to 45 deg .   chest PT and IS will be encouraged  monitor adequacy of oxygenation and ventilation and attempt to wean oxygen  Nutritional goals will be met using po eventually , ensure adequate caloric intake and montior the same  Stress ulcer and VTE prophylaxis will be achieved    Glycemic control is satisfactory  Electrolytes have been repleted as necessary and wound care has been carried out. Pain control has been achieved.   agressive physical therapy and early mobility and ambulation goals will be met   The family was updated about the course and plan  CRITICAL CARE TIME SPENT in evaluation and management, reassessments, review and interpretation of labs and x-rays, ventilator and hemodynamic management, formulating a plan and coordinating care: ___45____ MIN.  Time does not include procedural time.  CTICU ATTENDING     					    Danny Santiago MD

## 2017-02-23 ENCOUNTER — TRANSCRIPTION ENCOUNTER (OUTPATIENT)
Age: 67
End: 2017-02-23

## 2017-02-23 LAB
ALBUMIN SERPL ELPH-MCNC: 3.1 G/DL — LOW (ref 3.4–5)
ALP SERPL-CCNC: 39 U/L — LOW (ref 40–120)
ALT FLD-CCNC: 14 U/L — SIGNIFICANT CHANGE UP (ref 12–42)
ANION GAP SERPL CALC-SCNC: 6 MMOL/L — LOW (ref 9–16)
ANION GAP SERPL CALC-SCNC: 9 MMOL/L — SIGNIFICANT CHANGE UP (ref 9–16)
APTT BLD: 32.4 SEC — SIGNIFICANT CHANGE UP (ref 27.5–37.4)
APTT BLD: 41.2 SEC — HIGH (ref 27.5–37.4)
AST SERPL-CCNC: 23 U/L — SIGNIFICANT CHANGE UP (ref 15–37)
BILIRUB SERPL-MCNC: 0.9 MG/DL — SIGNIFICANT CHANGE UP (ref 0.2–1.2)
BLD GP AB SCN SERPL QL: NEGATIVE — SIGNIFICANT CHANGE UP
BUN SERPL-MCNC: 43 MG/DL — HIGH (ref 7–23)
BUN SERPL-MCNC: 45 MG/DL — HIGH (ref 7–23)
CALCIUM SERPL-MCNC: 8.2 MG/DL — LOW (ref 8.5–10.5)
CALCIUM SERPL-MCNC: 8.6 MG/DL — SIGNIFICANT CHANGE UP (ref 8.5–10.5)
CHLORIDE SERPL-SCNC: 100 MMOL/L — SIGNIFICANT CHANGE UP (ref 96–108)
CHLORIDE SERPL-SCNC: 97 MMOL/L — SIGNIFICANT CHANGE UP (ref 96–108)
CO2 SERPL-SCNC: 29 MMOL/L — SIGNIFICANT CHANGE UP (ref 22–31)
CO2 SERPL-SCNC: 30 MMOL/L — SIGNIFICANT CHANGE UP (ref 22–31)
CREAT SERPL-MCNC: 1.71 MG/DL — HIGH (ref 0.5–1.3)
CREAT SERPL-MCNC: 1.91 MG/DL — HIGH (ref 0.5–1.3)
GLUCOSE SERPL-MCNC: 121 MG/DL — HIGH (ref 70–99)
GLUCOSE SERPL-MCNC: 172 MG/DL — HIGH (ref 70–99)
HCT VFR BLD CALC: 26.5 % — LOW (ref 39–50)
HCT VFR BLD CALC: 30.5 % — LOW (ref 39–50)
HGB BLD-MCNC: 10.3 G/DL — LOW (ref 13–17)
HGB BLD-MCNC: 8.9 G/DL — LOW (ref 13–17)
INR BLD: 1.12 — SIGNIFICANT CHANGE UP (ref 0.88–1.16)
INR BLD: 1.23 — HIGH (ref 0.88–1.16)
MAGNESIUM SERPL-MCNC: 2.1 MG/DL — SIGNIFICANT CHANGE UP (ref 1.6–2.4)
MCHC RBC-ENTMCNC: 30.4 PG — SIGNIFICANT CHANGE UP (ref 27–34)
MCHC RBC-ENTMCNC: 30.7 PG — SIGNIFICANT CHANGE UP (ref 27–34)
MCHC RBC-ENTMCNC: 33.6 G/DL — SIGNIFICANT CHANGE UP (ref 32–36)
MCHC RBC-ENTMCNC: 33.8 G/DL — SIGNIFICANT CHANGE UP (ref 32–36)
MCV RBC AUTO: 90.4 FL — SIGNIFICANT CHANGE UP (ref 80–100)
MCV RBC AUTO: 90.8 FL — SIGNIFICANT CHANGE UP (ref 80–100)
PHOSPHATE SERPL-MCNC: 3.3 MG/DL — SIGNIFICANT CHANGE UP (ref 2.5–4.5)
PLATELET # BLD AUTO: 113 K/UL — LOW (ref 150–400)
PLATELET # BLD AUTO: 142 K/UL — LOW (ref 150–400)
POTASSIUM SERPL-MCNC: 3.7 MMOL/L — SIGNIFICANT CHANGE UP (ref 3.5–5.3)
POTASSIUM SERPL-MCNC: 4.3 MMOL/L — SIGNIFICANT CHANGE UP (ref 3.5–5.3)
POTASSIUM SERPL-SCNC: 3.7 MMOL/L — SIGNIFICANT CHANGE UP (ref 3.5–5.3)
POTASSIUM SERPL-SCNC: 4.3 MMOL/L — SIGNIFICANT CHANGE UP (ref 3.5–5.3)
PROT SERPL-MCNC: 5.9 G/DL — LOW (ref 6.4–8.2)
PROTHROM AB SERPL-ACNC: 12.4 SEC — SIGNIFICANT CHANGE UP (ref 10–13.1)
PROTHROM AB SERPL-ACNC: 13.7 SEC — HIGH (ref 10–13.1)
RBC # BLD: 2.93 M/UL — LOW (ref 4.2–5.8)
RBC # BLD: 3.36 M/UL — LOW (ref 4.2–5.8)
RBC # FLD: 14.8 % — SIGNIFICANT CHANGE UP (ref 10.3–16.9)
RBC # FLD: 14.8 % — SIGNIFICANT CHANGE UP (ref 10.3–16.9)
RH IG SCN BLD-IMP: POSITIVE — SIGNIFICANT CHANGE UP
SODIUM SERPL-SCNC: 133 MMOL/L — LOW (ref 135–145)
SODIUM SERPL-SCNC: 138 MMOL/L — SIGNIFICANT CHANGE UP (ref 135–145)
WBC # BLD: 10 K/UL — SIGNIFICANT CHANGE UP (ref 3.8–10.5)
WBC # BLD: 10.2 K/UL — SIGNIFICANT CHANGE UP (ref 3.8–10.5)
WBC # FLD AUTO: 10 K/UL — SIGNIFICANT CHANGE UP (ref 3.8–10.5)
WBC # FLD AUTO: 10.2 K/UL — SIGNIFICANT CHANGE UP (ref 3.8–10.5)

## 2017-02-23 PROCEDURE — 71010: CPT | Mod: 26

## 2017-02-23 PROCEDURE — 93306 TTE W/DOPPLER COMPLETE: CPT | Mod: 26

## 2017-02-23 PROCEDURE — 93010 ELECTROCARDIOGRAM REPORT: CPT

## 2017-02-23 PROCEDURE — 99232 SBSQ HOSP IP/OBS MODERATE 35: CPT

## 2017-02-23 RX ORDER — POTASSIUM CHLORIDE 20 MEQ
20 PACKET (EA) ORAL ONCE
Qty: 0 | Refills: 0 | Status: COMPLETED | OUTPATIENT
Start: 2017-02-23 | End: 2017-02-23

## 2017-02-23 RX ORDER — SODIUM CHLORIDE 9 MG/ML
3 INJECTION INTRAMUSCULAR; INTRAVENOUS; SUBCUTANEOUS EVERY 8 HOURS
Qty: 0 | Refills: 0 | Status: DISCONTINUED | OUTPATIENT
Start: 2017-02-23 | End: 2017-02-24

## 2017-02-23 RX ORDER — ACETAMINOPHEN 500 MG
650 TABLET ORAL EVERY 6 HOURS
Qty: 0 | Refills: 0 | Status: DISCONTINUED | OUTPATIENT
Start: 2017-02-23 | End: 2017-02-24

## 2017-02-23 RX ORDER — FUROSEMIDE 40 MG
20 TABLET ORAL ONCE
Qty: 0 | Refills: 0 | Status: COMPLETED | OUTPATIENT
Start: 2017-02-23 | End: 2017-02-23

## 2017-02-23 RX ORDER — POTASSIUM CHLORIDE 20 MEQ
40 PACKET (EA) ORAL ONCE
Qty: 0 | Refills: 0 | Status: COMPLETED | OUTPATIENT
Start: 2017-02-23 | End: 2017-02-23

## 2017-02-23 RX ORDER — POLYETHYLENE GLYCOL 3350 17 G/17G
17 POWDER, FOR SOLUTION ORAL DAILY
Qty: 0 | Refills: 0 | Status: DISCONTINUED | OUTPATIENT
Start: 2017-02-23 | End: 2017-02-24

## 2017-02-23 RX ORDER — LIDOCAINE 4 G/100G
1 CREAM TOPICAL DAILY
Qty: 0 | Refills: 0 | Status: DISCONTINUED | OUTPATIENT
Start: 2017-02-23 | End: 2017-02-24

## 2017-02-23 RX ORDER — ALBUMIN HUMAN 25 %
250 VIAL (ML) INTRAVENOUS ONCE
Qty: 0 | Refills: 0 | Status: COMPLETED | OUTPATIENT
Start: 2017-02-23 | End: 2017-02-23

## 2017-02-23 RX ADMIN — INSULIN GLARGINE 10 UNIT(S): 100 INJECTION, SOLUTION SUBCUTANEOUS at 08:19

## 2017-02-23 RX ADMIN — SODIUM CHLORIDE 3 MILLILITER(S): 9 INJECTION INTRAMUSCULAR; INTRAVENOUS; SUBCUTANEOUS at 13:53

## 2017-02-23 RX ADMIN — Medication 100 MILLIGRAM(S): at 13:51

## 2017-02-23 RX ADMIN — HEPARIN SODIUM 7500 UNIT(S): 5000 INJECTION INTRAVENOUS; SUBCUTANEOUS at 15:40

## 2017-02-23 RX ADMIN — HEPARIN SODIUM 7500 UNIT(S): 5000 INJECTION INTRAVENOUS; SUBCUTANEOUS at 21:52

## 2017-02-23 RX ADMIN — Medication 10 MILLIGRAM(S): at 15:55

## 2017-02-23 RX ADMIN — FAMOTIDINE 20 MILLIGRAM(S): 10 INJECTION INTRAVENOUS at 12:24

## 2017-02-23 RX ADMIN — Medication 650 MILLIGRAM(S): at 22:50

## 2017-02-23 RX ADMIN — Medication 125 MILLILITER(S): at 01:37

## 2017-02-23 RX ADMIN — ATORVASTATIN CALCIUM 40 MILLIGRAM(S): 80 TABLET, FILM COATED ORAL at 21:50

## 2017-02-23 RX ADMIN — Medication 25 MILLIGRAM(S): at 13:52

## 2017-02-23 RX ADMIN — Medication 2: at 11:24

## 2017-02-23 RX ADMIN — CLOPIDOGREL BISULFATE 75 MILLIGRAM(S): 75 TABLET, FILM COATED ORAL at 12:24

## 2017-02-23 RX ADMIN — Medication 2: at 17:18

## 2017-02-23 RX ADMIN — Medication 4 UNIT(S): at 11:24

## 2017-02-23 RX ADMIN — Medication 25 MILLIGRAM(S): at 06:33

## 2017-02-23 RX ADMIN — LIDOCAINE 1 PATCH: 4 CREAM TOPICAL at 22:57

## 2017-02-23 RX ADMIN — Medication 81 MILLIGRAM(S): at 12:24

## 2017-02-23 RX ADMIN — Medication 650 MILLIGRAM(S): at 21:50

## 2017-02-23 RX ADMIN — Medication 40 MILLIEQUIVALENT(S): at 09:06

## 2017-02-23 RX ADMIN — Medication 4 UNIT(S): at 17:18

## 2017-02-23 RX ADMIN — Medication 100 MILLIGRAM(S): at 06:33

## 2017-02-23 RX ADMIN — SENNA PLUS 1 TABLET(S): 8.6 TABLET ORAL at 21:50

## 2017-02-23 RX ADMIN — Medication 4 UNIT(S): at 08:21

## 2017-02-23 RX ADMIN — Medication 100 MILLIGRAM(S): at 21:50

## 2017-02-23 RX ADMIN — Medication 25 MILLIGRAM(S): at 21:50

## 2017-02-23 RX ADMIN — Medication 20 MILLIGRAM(S): at 12:24

## 2017-02-23 RX ADMIN — SODIUM CHLORIDE 3 MILLILITER(S): 9 INJECTION INTRAMUSCULAR; INTRAVENOUS; SUBCUTANEOUS at 22:00

## 2017-02-23 RX ADMIN — HEPARIN SODIUM 7500 UNIT(S): 5000 INJECTION INTRAVENOUS; SUBCUTANEOUS at 06:33

## 2017-02-23 NOTE — PROGRESS NOTE ADULT - SUBJECTIVE AND OBJECTIVE BOX
INTERVAL HPI/OVERNIGHT EVENTS:    Patient is a 66y old  Male who presented following a positive stress test and is now s/p CABG.      Pt reports the following symptoms:    CONSTITUTIONAL:  Negative fever or chills, feels well, good appetite  EYES:  Negative  blurry vision or double vision  CARDIOVASCULAR:  Negative for chest pain or palpitations  RESPIRATORY:  Negative for cough, wheezing, or SOB   GASTROINTESTINAL:  Negative for nausea, vomiting, diarrhea, constipation, or abdominal pain  GENITOURINARY:  Negative frequency, urgency or dysuria  NEUROLOGIC:  No headache, confusion, dizziness, lightheadedness    MEDICATIONS  (STANDING):  sodium chloride 0.45%. 1000milliLiter(s) IV Continuous <Continuous>  heparin  Injectable 7500Unit(s) SubCutaneous every 8 hours  clopidogrel Tablet 75milliGRAM(s) Oral daily  aspirin enteric coated 81milliGRAM(s) Oral daily  atorvastatin 40milliGRAM(s) Oral at bedtime  famotidine    Tablet 20milliGRAM(s) Oral daily  docusate sodium 100milliGRAM(s) Oral three times a day  senna 1Tablet(s) Oral at bedtime  metoprolol 25milliGRAM(s) Oral every 8 hours  insulin glargine Injectable (LANTUS) 10Unit(s) SubCutaneous every morning  insulin lispro Injectable (HumaLOG) 4Unit(s) SubCutaneous three times a day before meals  insulin lispro (HumaLOG) corrective regimen sliding scale  SubCutaneous Before meals and at bedtime  dextrose 5%. 1000milliLiter(s) IV Continuous <Continuous>  dextrose 50% Injectable 12.5Gram(s) IV Push once  dextrose 50% Injectable 25Gram(s) IV Push once  dextrose 50% Injectable 25Gram(s) IV Push once  furosemide   Injectable 20milliGRAM(s) IV Push once    MEDICATIONS  (PRN):  dextrose Gel 1Dose(s) Oral once PRN Blood Glucose LESS THAN 70 milliGRAM(s)/deciliter  glucagon  Injectable 1milliGRAM(s) IntraMuscular once PRN Glucose LESS THAN 70 milligrams/deciliter  polyethylene glycol 3350 17Gram(s) Oral daily PRN Constipation      PHYSICAL EXAM  Vital Signs Last 24 Hrs  T(C): 36.9, Max: 36.9 (02-23 @ 10:22)  T(F): 98.4, Max: 98.4 (02-23 @ 10:22)  HR: 80 (74 - 98)  BP: 126/55 (77/54 - 137/64)  BP(mean): 77 (66 - 92)  RR: 16 (10 - 25)  SpO2: 93% (92% - 97%)    Constitutional: wn/wd in NAD.   HEENT: NCAT, MMM, OP clear, EOMI, , no proptosis or lid retraction  Neck: no thyromegaly or palpable thyroid nodules   Respiratory: lungs CTAB.  Cardiovascular: regular rhythm, normal S1 and S2, no audible murmurs, no peripheral edema  GI: soft, NT/ND, no masses/HSM appreciated.  Neurology: no tremors, DTR 2+  Skin: no visible rashes/lesions  Psychiatric: AAO x 3, normal affect/mood.    LABS:                        8.9    10.0  )-----------( 113      ( 23 Feb 2017 02:47 )             26.5     23 Feb 2017 02:47    138    |  100    |  43     ----------------------------<  121    3.7     |  29     |  1.91     Ca    8.2        23 Feb 2017 02:47  Phos  3.3       23 Feb 2017 02:47  Mg     2.1       23 Feb 2017 02:47    TPro  5.9    /  Alb  3.1    /  TBili  0.9    /  DBili  x      /  AST  23     /  ALT  14     /  AlkPhos  39     23 Feb 2017 02:47    PT/INR - ( 23 Feb 2017 02:47 )   PT: 13.7 sec;   INR: 1.23          PTT - ( 23 Feb 2017 02:47 )  PTT:41.2 sec    Thyroid Stimulating Hormone, Serum: 2.021 uIU/mL (02-17 @ 13:05)      HbA1C: 7.6 % (02-17 @ 07:13)      CAPILLARY BLOOD GLUCOSE  127 (22 Feb 2017 22:00)      Insulin Sliding Scale requirements X 24 Hours:      RADIOLOGY & ADDITIONAL TESTS:      A/P: 66y Male with history of DM type II presenting for finindgs of a positive stress test, now s/p CABG with good glycemic control.      1.  DM - type 2, controlled, complicated.  Pt can continue current regimen of 10 units lantus in the morning and 4 units of lispro pre-meal three times daily.  Please continue moderate dose sliding scale 4 times daily with meals and at bedtime.  Please continue consistent carbohydrate diet.      Goal FSG is 100-180  Will continue to monitor   For discharge, if pt's EUGENIE improves, he can start metformin 500mg once daily and can start januvia 100mg once daily.  Recommend that pt stop actos and glipizide.      Pt can follow up with his primary endocrinologist Dr. Montez at [facility] or with [facility] by calling  to make an appointment.   Will discuss case with Dr. Dodson   and update primary team

## 2017-02-23 NOTE — DISCHARGE NOTE ADULT - CARE PROVIDERS DIRECT ADDRESSES
[email or web address]

## 2017-02-23 NOTE — DISCHARGE NOTE ADULT - MEDICATION SUMMARY - MEDICATIONS TO STOP TAKING
I will STOP taking the medications listed below when I get home from the hospital:    quinapril 20 mg oral tablet  -- 1 tab(s) by mouth once a day

## 2017-02-23 NOTE — DISCHARGE NOTE ADULT - PLAN OF CARE
s/ p CABG -Please follow up with Dr. Mckeon on 3/1/17 at 3:00PM.  The office is located at [facility], [facility], 4th floor. Call us with any questions #[phone]. Prompt #1.  -Please make a follow-up appointment with your primary care provider, cardiologist and endocrinologist within 1-2 weeks of discharge.   -Walk daily as tolerated and use your incentive spirometer ten times every hour.    -No driving or strenuous activity/exercise for 6 weeks, or until cleared by your surgeon. Please do not lift anything greater than ten pounds.   -Gently clean your incisions with anti-bacterial soap and water, pat dry.  You may leave them open to air.    -Call your doctor if you have shortness of breath, chest pain not relieved by pain medication, dizziness, fever >101.5, or increased redness or drainage from incisions.

## 2017-02-23 NOTE — DISCHARGE NOTE ADULT - HOSPITAL COURSE
This is a 65 y/o M PMH HTN, HLD, DM2, CAD s/p multiple PCI's @[facility] (05/24/2005 PCI to mLAD x2, pLAD and 04/18/2005 PCI to mid/prox/distal RCA, OM2), chronic renal insufficiency who was sent to the [facility] ED 02/16/17 in the setting of an abnormal routine nuclear stress test. Pt was admitted to [facility] on 2/16/17 for cardiac catheterization.  Cardiac cath revealed 85% mLAD, 80% pD1, 80%  mLCx, 100% pRCA w/ left to right collaterals, EF 55%. CT Surgery was then consulted for surgical evaluation. Pt underwent uncomplicated CABGx2 on 2/20/17 with Dr. Mckeon. Pt was extubated by POD#0 and POD#1 substernal Ct was removed and BB was started. POD#2 pt remained stable and POD#3 pt receive 1 PRBC and post-op echocardiogram was negative for pericardial effusion. POD#3 pt was also transferred to  and remained stable. POD#4 pacing wires were cut at the bedside and as per  pt was deemed stable for discharge home.     Of note, pt's A1c was elevated during admission to 7.6 and endocrine team was following throughout admission. As per Dr. Mckeon pt was instructed to follow-up with his endocrinologist as an outpatient to monitor his HgbA1c and for DM medication management.

## 2017-02-23 NOTE — DISCHARGE NOTE ADULT - CARE PROVIDER_API CALL
SARA Mckeon (MD), Thoracic and Cardiac Surgery  [address] [place] [address]  Phone: [phone]  Fax: [phone]

## 2017-02-23 NOTE — PROGRESS NOTE ADULT - SUBJECTIVE AND OBJECTIVE BOX
INTERVAL HPI: no events overnight comfortable no cp sob palp oob  	  MEDICATIONS:  metoprolol 25milliGRAM(s) Oral every 8 hours  furosemide   Injectable 20milliGRAM(s) IV Push once          famotidine    Tablet 20milliGRAM(s) Oral daily  docusate sodium 100milliGRAM(s) Oral three times a day  senna 1Tablet(s) Oral at bedtime  polyethylene glycol 3350 17Gram(s) Oral daily PRN    atorvastatin 40milliGRAM(s) Oral at bedtime  insulin glargine Injectable (LANTUS) 10Unit(s) SubCutaneous every morning  insulin lispro Injectable (HumaLOG) 4Unit(s) SubCutaneous three times a day before meals  insulin lispro (HumaLOG) corrective regimen sliding scale  SubCutaneous Before meals and at bedtime  dextrose Gel 1Dose(s) Oral once PRN  dextrose 50% Injectable 12.5Gram(s) IV Push once  dextrose 50% Injectable 25Gram(s) IV Push once  dextrose 50% Injectable 25Gram(s) IV Push once  glucagon  Injectable 1milliGRAM(s) IntraMuscular once PRN    sodium chloride 0.45%. 1000milliLiter(s) IV Continuous <Continuous>  heparin  Injectable 7500Unit(s) SubCutaneous every 8 hours  clopidogrel Tablet 75milliGRAM(s) Oral daily  aspirin enteric coated 81milliGRAM(s) Oral daily  dextrose 5%. 1000milliLiter(s) IV Continuous <Continuous>      REVIEW OF SYSTEMS:  [x] As per HPI  CONSTITUTIONAL: No fever, weight loss, or fatigue  RESPIRATORY: No cough, wheezing, chills or hemoptysis; No Shortness of Breath  CARDIOVASCULAR: No chest pain, palpitations, dizziness, or leg swelling  GASTROINTESTINAL: No abdominal or epigastric pain. No nausea, vomiting, or hematemesis; No diarrhea or constipation. No melena or hematochezia.  MUSCULOSKELETAL: No joint pain or swelling; No muscle, back, or extremity pain  [x] All others negative	  [ ] Unable to obtain    PHYSICAL EXAM:  T(C): 36.9, Max: 36.9 (02-23 @ 10:22)  HR: 80 (74 - 98)  BP: 126/55 (77/54 - 137/64)  RR: 16 (10 - 25)  SpO2: 93% (92% - 97%)  Wt(kg): --  I&O's Summary  I & Os for 24h ending 23 Feb 2017 07:00  =============================================  IN: 1720 ml / OUT: 3292 ml / NET: -1572 ml    I & Os for current day (as of 23 Feb 2017 11:57)  =============================================  IN: 240 ml / OUT: 0 ml / NET: 240 ml        Appearance: Normal	  HEENT:   Normal oral mucosa  Cardiovascular: Normal S1 S2, No JVD, No murmurs, No edema  Respiratory: Lungs clear to auscultation	  Gastrointestinal:  Soft, Non-tender, + BS	  Extremities: Normal range of motion, No clubbing, cyanosis or edema  Vascular: Peripheral pulses palpable 2+ bilaterally    TELEMETRY: 	    ECG:    	  RADIOLOGY:   CXR:  CT:  US:    CARDIAC TESTING:  Echocardiogram:  Catheterization:  Stress Test:      LABS:	 	    CARDIAC MARKERS:                                  8.9    10.0  )-----------( 113      ( 23 Feb 2017 02:47 )             26.5     23 Feb 2017 02:47    138    |  100    |  43     ----------------------------<  121    3.7     |  29     |  1.91     Ca    8.2        23 Feb 2017 02:47  Phos  3.3       23 Feb 2017 02:47  Mg     2.1       23 Feb 2017 02:47    TPro  5.9    /  Alb  3.1    /  TBili  0.9    /  DBili  x      /  AST  23     /  ALT  14     /  AlkPhos  39     23 Feb 2017 02:47    proBNP:   Lipid Profile:   HgA1c:   TSH:     ASSESSMENT/PLAN: 	  s/p cabg-  creatinine stabilized   cont beta blockade,   monitor renal function  reoplete electrolytes

## 2017-02-23 NOTE — DISCHARGE NOTE ADULT - PATIENT PORTAL LINK FT
“You can access the [employer] Patient Portal, offered by [facility], by registering with the following website: [email or web address]”

## 2017-02-23 NOTE — DISCHARGE NOTE ADULT - MEDICATION SUMMARY - MEDICATIONS TO TAKE
I will START or STAY ON the medications listed below when I get home from the hospital:    acetaminophen 325 mg oral tablet  -- 2 tab(s) by mouth every 6 hours, As needed, Moderate Pain (4 - 6)  -- Indication: For As needed for mild-moderate pain    aspirin 81 mg oral tablet  -- 1 tab(s) by mouth once a day  -- Indication: For Heart disease    Percocet 2.5/325 325 mg-2.5 mg oral tablet  -- 1 tab(s) by mouth every 6 hours, As Needed severe pain MDD:2 tablets  -- Caution federal law prohibits the transfer of this drug to any person other  than the person for whom it was prescribed.  May cause drowsiness.  Alcohol may intensify this effect.  Use care when operating dangerous machinery.  This prescription cannot be refilled.  This product contains acetaminophen.  Do not use  with any other product containing acetaminophen to prevent possible liver damage.  Using more of this medication than prescribed may cause serious breathing problems.    -- Indication: For As needed for severe pain    glipiZIDE 5 mg oral tablet  -- 1 tab(s) by mouth 3 times a day  -- Indication: For Diabetes    Actos 30 mg oral tablet  -- 1 tab(s) by mouth once a day  -- Indication: For Diabetes     simvastatin 40 mg oral tablet  -- 1 tab(s) by mouth once a day (at bedtime)  -- Indication: For Cholesterol    Zetia 10 mg oral tablet  -- 0.5 tab(s) by mouth once a day  -- Indication: For Cholesterol    clopidogrel 75 mg oral tablet  -- 1 tab(s) by mouth once a day  -- Indication: For Heart disease    Metoprolol Tartrate 50 mg oral tablet  -- 1 tab(s) by mouth 2 times a day  -- It is very important that you take or use this exactly as directed.  Do not skip doses or discontinue unless directed by your doctor.  May cause drowsiness.  Alcohol may intensify this effect.  Use care when operating dangerous machinery.  Some non-prescription drugs may aggravate your condition.  Read all labels carefully.  If a warning appears, check with your doctor before taking.  Take with food or milk.  This drug may impair the ability to drive or operate machinery.  Use care until you become familiar with its effects.    -- Indication: For blood pressure    polyethylene glycol 3350 oral powder for reconstitution  -- 17 gram(s) by mouth once a day, As needed, Constipation    Dispense one month supply  -- Indication: For As needed for constipation

## 2017-02-23 NOTE — DISCHARGE NOTE ADULT - REASON FOR ADMISSION
Went for a routine stress test and results were positive. Patient denies any symptoms prior to checkup/est. CAD

## 2017-02-24 VITALS — TEMPERATURE: 99 F

## 2017-02-24 LAB
ANION GAP SERPL CALC-SCNC: 8 MMOL/L — LOW (ref 9–16)
BASOPHILS NFR BLD AUTO: 0.4 % — SIGNIFICANT CHANGE UP (ref 0–2)
BUN SERPL-MCNC: 43 MG/DL — HIGH (ref 7–23)
CALCIUM SERPL-MCNC: 8.5 MG/DL — SIGNIFICANT CHANGE UP (ref 8.5–10.5)
CHLORIDE SERPL-SCNC: 99 MMOL/L — SIGNIFICANT CHANGE UP (ref 96–108)
CO2 SERPL-SCNC: 28 MMOL/L — SIGNIFICANT CHANGE UP (ref 22–31)
CREAT SERPL-MCNC: 1.69 MG/DL — HIGH (ref 0.5–1.3)
EOSINOPHIL NFR BLD AUTO: 3.4 % — SIGNIFICANT CHANGE UP (ref 0–6)
GLUCOSE SERPL-MCNC: 125 MG/DL — HIGH (ref 70–99)
HCT VFR BLD CALC: 30.3 % — LOW (ref 39–50)
HGB BLD-MCNC: 10.1 G/DL — LOW (ref 13–17)
LYMPHOCYTES # BLD AUTO: 21.2 % — SIGNIFICANT CHANGE UP (ref 13–44)
MAGNESIUM SERPL-MCNC: 2 MG/DL — SIGNIFICANT CHANGE UP (ref 1.6–2.4)
MCHC RBC-ENTMCNC: 30.2 PG — SIGNIFICANT CHANGE UP (ref 27–34)
MCHC RBC-ENTMCNC: 33.3 G/DL — SIGNIFICANT CHANGE UP (ref 32–36)
MCV RBC AUTO: 90.7 FL — SIGNIFICANT CHANGE UP (ref 80–100)
MONOCYTES NFR BLD AUTO: 14.7 % — HIGH (ref 2–14)
NEUTROPHILS NFR BLD AUTO: 60.3 % — SIGNIFICANT CHANGE UP (ref 43–77)
PLATELET # BLD AUTO: 152 K/UL — SIGNIFICANT CHANGE UP (ref 150–400)
POTASSIUM SERPL-MCNC: 4.1 MMOL/L — SIGNIFICANT CHANGE UP (ref 3.5–5.3)
POTASSIUM SERPL-SCNC: 4.1 MMOL/L — SIGNIFICANT CHANGE UP (ref 3.5–5.3)
RBC # BLD: 3.34 M/UL — LOW (ref 4.2–5.8)
RBC # FLD: 14.6 % — SIGNIFICANT CHANGE UP (ref 10.3–16.9)
SODIUM SERPL-SCNC: 135 MMOL/L — SIGNIFICANT CHANGE UP (ref 135–145)
WBC # BLD: 8.9 K/UL — SIGNIFICANT CHANGE UP (ref 3.8–10.5)
WBC # FLD AUTO: 8.9 K/UL — SIGNIFICANT CHANGE UP (ref 3.8–10.5)

## 2017-02-24 PROCEDURE — 83880 ASSAY OF NATRIURETIC PEPTIDE: CPT

## 2017-02-24 PROCEDURE — 94150 VITAL CAPACITY TEST: CPT

## 2017-02-24 PROCEDURE — 82553 CREATINE MB FRACTION: CPT

## 2017-02-24 PROCEDURE — C1769: CPT

## 2017-02-24 PROCEDURE — 93005 ELECTROCARDIOGRAM TRACING: CPT

## 2017-02-24 PROCEDURE — 85730 THROMBOPLASTIN TIME PARTIAL: CPT

## 2017-02-24 PROCEDURE — 80048 BASIC METABOLIC PNL TOTAL CA: CPT

## 2017-02-24 PROCEDURE — 80061 LIPID PANEL: CPT

## 2017-02-24 PROCEDURE — 71010: CPT | Mod: 26

## 2017-02-24 PROCEDURE — 82330 ASSAY OF CALCIUM: CPT

## 2017-02-24 PROCEDURE — 84443 ASSAY THYROID STIM HORMONE: CPT

## 2017-02-24 PROCEDURE — 71045 X-RAY EXAM CHEST 1 VIEW: CPT

## 2017-02-24 PROCEDURE — 93880 EXTRACRANIAL BILAT STUDY: CPT

## 2017-02-24 PROCEDURE — 82803 BLOOD GASES ANY COMBINATION: CPT

## 2017-02-24 PROCEDURE — 36415 COLL VENOUS BLD VENIPUNCTURE: CPT

## 2017-02-24 PROCEDURE — 81003 URINALYSIS AUTO W/O SCOPE: CPT

## 2017-02-24 PROCEDURE — A9505: CPT

## 2017-02-24 PROCEDURE — 36430 TRANSFUSION BLD/BLD COMPNT: CPT

## 2017-02-24 PROCEDURE — 99232 SBSQ HOSP IP/OBS MODERATE 35: CPT

## 2017-02-24 PROCEDURE — 93017 CV STRESS TEST TRACING ONLY: CPT

## 2017-02-24 PROCEDURE — 80053 COMPREHEN METABOLIC PANEL: CPT

## 2017-02-24 PROCEDURE — 84100 ASSAY OF PHOSPHORUS: CPT

## 2017-02-24 PROCEDURE — 83735 ASSAY OF MAGNESIUM: CPT

## 2017-02-24 PROCEDURE — 82550 ASSAY OF CK (CPK): CPT

## 2017-02-24 PROCEDURE — C1889: CPT

## 2017-02-24 PROCEDURE — P9016: CPT

## 2017-02-24 PROCEDURE — 86850 RBC ANTIBODY SCREEN: CPT

## 2017-02-24 PROCEDURE — 85018 HEMOGLOBIN: CPT

## 2017-02-24 PROCEDURE — 85027 COMPLETE CBC AUTOMATED: CPT

## 2017-02-24 PROCEDURE — 99285 EMERGENCY DEPT VISIT HI MDM: CPT | Mod: 25

## 2017-02-24 PROCEDURE — 85025 COMPLETE CBC W/AUTO DIFF WBC: CPT

## 2017-02-24 PROCEDURE — 81001 URINALYSIS AUTO W/SCOPE: CPT

## 2017-02-24 PROCEDURE — A9500: CPT

## 2017-02-24 PROCEDURE — 86901 BLOOD TYPING SEROLOGIC RH(D): CPT

## 2017-02-24 PROCEDURE — C1887: CPT

## 2017-02-24 PROCEDURE — 84295 ASSAY OF SERUM SODIUM: CPT

## 2017-02-24 PROCEDURE — 86900 BLOOD TYPING SEROLOGIC ABO: CPT

## 2017-02-24 PROCEDURE — 84484 ASSAY OF TROPONIN QUANT: CPT

## 2017-02-24 PROCEDURE — 83036 HEMOGLOBIN GLYCOSYLATED A1C: CPT

## 2017-02-24 PROCEDURE — 86923 COMPATIBILITY TEST ELECTRIC: CPT

## 2017-02-24 PROCEDURE — 78452 HT MUSCLE IMAGE SPECT MULT: CPT

## 2017-02-24 PROCEDURE — 83605 ASSAY OF LACTIC ACID: CPT

## 2017-02-24 PROCEDURE — P9045: CPT

## 2017-02-24 PROCEDURE — 93306 TTE W/DOPPLER COMPLETE: CPT

## 2017-02-24 PROCEDURE — 97162 PT EVAL MOD COMPLEX 30 MIN: CPT

## 2017-02-24 PROCEDURE — 84132 ASSAY OF SERUM POTASSIUM: CPT

## 2017-02-24 PROCEDURE — 97116 GAIT TRAINING THERAPY: CPT

## 2017-02-24 PROCEDURE — 85610 PROTHROMBIN TIME: CPT

## 2017-02-24 RX ORDER — ASPIRIN/CALCIUM CARB/MAGNESIUM 324 MG
1 TABLET ORAL
Qty: 0 | Refills: 0 | COMMUNITY

## 2017-02-24 RX ORDER — PIOGLITAZONE HYDROCHLORIDE 15 MG/1
1 TABLET ORAL
Qty: 0 | Refills: 0 | COMMUNITY

## 2017-02-24 RX ORDER — CLOPIDOGREL BISULFATE 75 MG/1
1 TABLET, FILM COATED ORAL
Qty: 30 | Refills: 0
Start: 2017-02-24 | End: 2017-03-26

## 2017-02-24 RX ORDER — QUINAPRIL HYDROCHLORIDE 40 MG/1
1 TABLET, FILM COATED ORAL
Qty: 0 | Refills: 0 | COMMUNITY

## 2017-02-24 RX ORDER — METOPROLOL TARTRATE 50 MG
1 TABLET ORAL
Qty: 60 | Refills: 0
Start: 2017-02-24 | End: 2017-03-26

## 2017-02-24 RX ORDER — SIMVASTATIN 20 MG/1
1 TABLET, FILM COATED ORAL
Qty: 30 | Refills: 0
Start: 2017-02-24 | End: 2017-03-26

## 2017-02-24 RX ORDER — POLYETHYLENE GLYCOL 3350 17 G/17G
17 POWDER, FOR SOLUTION ORAL
Qty: 1 | Refills: 0
Start: 2017-02-24

## 2017-02-24 RX ORDER — PIOGLITAZONE HYDROCHLORIDE 15 MG/1
1 TABLET ORAL
Qty: 30 | Refills: 0
Start: 2017-02-24 | End: 2017-03-26

## 2017-02-24 RX ORDER — ASPIRIN/CALCIUM CARB/MAGNESIUM 324 MG
1 TABLET ORAL
Qty: 30 | Refills: 0
Start: 2017-02-24 | End: 2017-03-26

## 2017-02-24 RX ORDER — EZETIMIBE 10 MG/1
0.5 TABLET ORAL
Qty: 0 | Refills: 0 | COMMUNITY

## 2017-02-24 RX ORDER — EZETIMIBE 10 MG/1
0.5 TABLET ORAL
Qty: 15 | Refills: 0
Start: 2017-02-24 | End: 2017-03-26

## 2017-02-24 RX ORDER — OXYCODONE AND ACETAMINOPHEN 5; 325 MG/1; MG/1
1 TABLET ORAL
Qty: 25 | Refills: 0
Start: 2017-02-24

## 2017-02-24 RX ORDER — ACETAMINOPHEN 500 MG
2 TABLET ORAL
Qty: 100 | Refills: 0
Start: 2017-02-24

## 2017-02-24 RX ORDER — SIMVASTATIN 20 MG/1
1 TABLET, FILM COATED ORAL
Qty: 0 | Refills: 0 | COMMUNITY

## 2017-02-24 RX ADMIN — CLOPIDOGREL BISULFATE 75 MILLIGRAM(S): 75 TABLET, FILM COATED ORAL at 12:13

## 2017-02-24 RX ADMIN — Medication 4: at 12:48

## 2017-02-24 RX ADMIN — Medication 81 MILLIGRAM(S): at 12:13

## 2017-02-24 RX ADMIN — Medication 4 UNIT(S): at 08:04

## 2017-02-24 RX ADMIN — Medication 4 UNIT(S): at 12:48

## 2017-02-24 RX ADMIN — Medication 100 MILLIGRAM(S): at 05:27

## 2017-02-24 RX ADMIN — HEPARIN SODIUM 7500 UNIT(S): 5000 INJECTION INTRAVENOUS; SUBCUTANEOUS at 05:27

## 2017-02-24 RX ADMIN — SODIUM CHLORIDE 3 MILLILITER(S): 9 INJECTION INTRAMUSCULAR; INTRAVENOUS; SUBCUTANEOUS at 05:35

## 2017-02-24 RX ADMIN — FAMOTIDINE 20 MILLIGRAM(S): 10 INJECTION INTRAVENOUS at 12:13

## 2017-02-24 RX ADMIN — INSULIN GLARGINE 10 UNIT(S): 100 INJECTION, SOLUTION SUBCUTANEOUS at 08:04

## 2017-02-24 RX ADMIN — Medication 25 MILLIGRAM(S): at 05:28

## 2017-02-24 NOTE — PROGRESS NOTE ADULT - SUBJECTIVE AND OBJECTIVE BOX
Patient discussed on morning rounds with Dr. Mckeon     Operation / Date: CABGx2 2/20/17    Surgeon:Dr. Mckeon     Referring Physician: Dr. Sharpe    SUBJECTIVE ASSESSMENT:  66y Male POD#4 from CABG. Pt doing well this AM denies CP or SOB and states that he is ready for discharge home.    Hospital Course:  65 y/o M PMH HTN, HLD, DM2, CAD s/p multiple PCI's @[facility] (05/24/2005 PCI to mLAD x2, pLAD and 04/18/2005 PCI to mid/prox/distal RCA, OM2), chronic renal insufficiency who was sent to the [facility] ED 02/16/17 in the setting of an abnormal routine nuclear stress test. Pt was admitted to [facility] on 2/16/17 for cardiac catheterization.  Cardiac cath revealed 85% mLAD, 80% pD1, 80%  mLCx, 100% pRCA w/ left to right collaterals, EF 55%. CT Surgery was then consulted for surgical evaluation. Pt underwent uncomplicated CABGx2 on 2/20/17 with Dr. Mckeon. Pt was extubated by POD#0 and POD#1 substernal Ct was removed and BB was started. POD#2 pt remained stable and POD#3 pt receive 1 PRBC and post-op echocardiogram was negative for pericardial effusion. POD#3 pt was also transferred to  and remained stable. POD#4 pacing wires were cut at the bedside and as per  pt was deemed stable for discharge home.     Of note, pt's A1c was elevated during admission to 7.6 and endocrine team was following throughout admission. As per Dr. Mckeon pt was instructed to follow-up with his endocrinologist as an outpatient to monitor his HgbA1c and for DM medication management.       Vital Signs Last 24 Hrs  T(C): 37.2, Max: 37.2 (02-24 @ 10:00)  T(F): 98.9, Max: 98.9 (02-24 @ 10:00)  HR: 104 (78 - 104)  BP: 135/70 (98/53 - 135/70)  BP(mean): 94 (69 - 94)  RR: 12 (12 - 20)  SpO2: 93% (93% - 94%)  I&O's Detail    I & Os for current day (as of 24 Feb 2017 11:44)  =============================================  IN:    Oral Fluid: 390 ml    Total IN: 390 ml  ---------------------------------------------  OUT:    Voided: 1000 ml    Total OUT: 1000 ml  ---------------------------------------------  Total NET: -610 ml      EPICARDIAL WIRES REMOVED: Yes  TIE DOWNS REMOVED: Yes.    PHYSICAL EXAM:    General: NAD sitting upright in bed  Neurological: A&Ox3, BARBOZA with no focal deficits  Cardiovascular: RRR, no m/r/g  Respiratory: Mild crackles at RLL, all other lung fields grossly CTA  Gastrointestinal: NT-ND, soft  Extremities: WWP, mild edema localized to Left LE (EVH site), no calf tenderness b/l   Incision Sites: sternotomy and Left EVH site CDI no erythema or drainage. +ecchymosis extending from left EVH site to medial thigh. Sternotomy with localized ecchymosis.    LABS:                        10.1   8.9   )-----------( 152      ( 24 Feb 2017 06:34 )             30.3       COUMADIN: No    PT/INR - ( 23 Feb 2017 16:53 )   PT: 12.4 sec;   INR: 1.12     PTT - ( 23 Feb 2017 16:53 )  PTT:32.4 sec    135    |  99     |  43     ----------------------------<  125    4.1     |  28     |  1.69     Ca    8.5        24 Feb 2017 06:34  Phos  3.3       23 Feb 2017 02:47  Mg     2.0       24 Feb 2017 06:34    TPro  5.9    /  Alb  3.1    /  TBili  0.9    /  DBili  x      /  AST  23     /  ALT  14     /  AlkPhos  39     23 Feb 2017 02:47    MEDICATIONS  (STANDING):  heparin  Injectable 7500Unit(s) SubCutaneous every 8 hours  clopidogrel Tablet 75milliGRAM(s) Oral daily  aspirin enteric coated 81milliGRAM(s) Oral daily  atorvastatin 40milliGRAM(s) Oral at bedtime  famotidine    Tablet 20milliGRAM(s) Oral daily  docusate sodium 100milliGRAM(s) Oral three times a day  senna 1Tablet(s) Oral at bedtime  metoprolol 25milliGRAM(s) Oral every 8 hours  insulin glargine Injectable (LANTUS) 10Unit(s) SubCutaneous every morning  insulin lispro Injectable (HumaLOG) 4Unit(s) SubCutaneous three times a day before meals  insulin lispro (HumaLOG) corrective regimen sliding scale  SubCutaneous Before meals and at bedtime  sodium chloride 0.9% lock flush 3milliLiter(s) IV Push every 8 hours  lidocaine   Patch 1Patch Transdermal daily      Discharge CXR: reviewed by Dr. Mckeon. no obvious PTX, stable CXR    Discharge ECHO: left ventricular ejection fraction is estimatedto be 50-55%The left atrial size is normal.Right atrial size is normal.There is mild to moderate aortic valve thickening.No aortic regurgitation noted.No hemodynamically significant valvular aortic stenosis.Other valves normal in structure and function.There is no echocardiographic evidence for pulmonary hypertension.No aortic root dilatation.There is no pericardial effusion.    D/C Instructions:    -Please follow up with Dr. Mckeon on 3/1/17 at 3:00PM.  The office is located at [facility], [facility], 4th floor. Call us with any questions #[phone]. Prompt #1.  -Please make a follow-up appointment with your primary care provider, cardiologist and endocrinologist within 1-2 weeks of discharge.   -Walk daily as tolerated and use your incentive spirometer ten times every hour.  -No driving or strenuous activity/exercise for 6 weeks, or until cleared by your surgeon. Please do not lift anything greater than ten pounds.   -Gently clean your incisions with anti-bacterial soap and water, pat dry.  You may leave them open to air.  -Call your doctor if you have shortness of breath, chest pain not relieved by pain medication, dizziness, fever >101.5, or increased redness or drainage from incisions..

## 2017-02-24 NOTE — PROGRESS NOTE ADULT - PROVIDER SPECIALTY LIST ADULT
CT Surgery
CT Surgery
Cardiology
Critical Care
Endocrinology
Intervent Cardiology
Intervent Cardiology
CT Surgery
Critical Care

## 2017-02-24 NOTE — CHART NOTE - NSCHARTNOTEFT_GEN_A_CORE
CT SURGERY NOTE:    As instructed by Dr. Mckeon, Pacing wires cut at the bedside without incident. Pt tolerated procedure well. Nursing staff was notified.

## 2017-02-24 NOTE — PROGRESS NOTE ADULT - SUBJECTIVE AND OBJECTIVE BOX
INTERVAL HPI: comfortable no events overnight no cp sob palp  	  MEDICATIONS:  metoprolol 25milliGRAM(s) Oral every 8 hours        acetaminophen   Tablet. 650milliGRAM(s) Oral every 6 hours PRN    famotidine    Tablet 20milliGRAM(s) Oral daily  docusate sodium 100milliGRAM(s) Oral three times a day  senna 1Tablet(s) Oral at bedtime  polyethylene glycol 3350 17Gram(s) Oral daily PRN    atorvastatin 40milliGRAM(s) Oral at bedtime  insulin glargine Injectable (LANTUS) 10Unit(s) SubCutaneous every morning  insulin lispro Injectable (HumaLOG) 4Unit(s) SubCutaneous three times a day before meals  insulin lispro (HumaLOG) corrective regimen sliding scale  SubCutaneous Before meals and at bedtime  dextrose Gel 1Dose(s) Oral once PRN  dextrose 50% Injectable 12.5Gram(s) IV Push once  dextrose 50% Injectable 25Gram(s) IV Push once  dextrose 50% Injectable 25Gram(s) IV Push once  glucagon  Injectable 1milliGRAM(s) IntraMuscular once PRN    heparin  Injectable 7500Unit(s) SubCutaneous every 8 hours  clopidogrel Tablet 75milliGRAM(s) Oral daily  aspirin enteric coated 81milliGRAM(s) Oral daily  dextrose 5%. 1000milliLiter(s) IV Continuous <Continuous>  lidocaine   Patch 1Patch Transdermal daily      REVIEW OF SYSTEMS:  [x] As per HPI  CONSTITUTIONAL: No fever, weight loss, or fatigue  RESPIRATORY: No cough, wheezing, chills or hemoptysis; No Shortness of Breath  CARDIOVASCULAR: No chest pain, palpitations, dizziness, or leg swelling  GASTROINTESTINAL: No abdominal or epigastric pain. No nausea, vomiting, or hematemesis; No diarrhea or constipation. No melena or hematochezia.  MUSCULOSKELETAL: No joint pain or swelling; No muscle, back, or extremity pain  [x] All others negative	  [ ] Unable to obtain    PHYSICAL EXAM:  T(C): 37.2, Max: 37.2 (02-24 @ 10:00)  HR: 104 (78 - 104)  BP: 135/70 (98/53 - 135/70)  RR: 12 (12 - 20)  SpO2: 93% (93% - 94%)  Wt(kg): --  I&O's Summary    I & Os for current day (as of 24 Feb 2017 11:49)  =============================================  IN: 390 ml / OUT: 1000 ml / NET: -610 ml        Appearance: Normal	  HEENT:   Normal oral mucosa  Cardiovascular: Normal S1 S2, No JVD, No murmurs, No edema  Respiratory: Lungs clear to auscultation	  Gastrointestinal:  Soft, Non-tender, + BS	  Extremities: Normal range of motion, No clubbing, cyanosis or edema  Vascular: Peripheral pulses palpable 2+ bilaterally    TELEMETRY: 	    ECG:    	  RADIOLOGY:   CXR:  CT:  US:    CARDIAC TESTING:  Echocardiogram:  Catheterization:  Stress Test:      LABS:	 	    CARDIAC MARKERS:                                  10.1   8.9   )-----------( 152      ( 24 Feb 2017 06:34 )             30.3     24 Feb 2017 06:34    135    |  99     |  43     ----------------------------<  125    4.1     |  28     |  1.69     Ca    8.5        24 Feb 2017 06:34  Phos  3.3       23 Feb 2017 02:47  Mg     2.0       24 Feb 2017 06:34    TPro  5.9    /  Alb  3.1    /  TBili  0.9    /  DBili  x      /  AST  23     /  ALT  14     /  AlkPhos  39     23 Feb 2017 02:47    proBNP:   Lipid Profile:   HgA1c:   TSH:     ASSESSMENT/PLAN: 	  s/p cabg-  creatinine stabilized   cont beta blockade,   monitor renal function  reoplete electrolytes

## 2017-02-28 DIAGNOSIS — E11.65 TYPE 2 DIABETES MELLITUS WITH HYPERGLYCEMIA: ICD-10-CM

## 2017-02-28 DIAGNOSIS — D64.9 ANEMIA, UNSPECIFIED: ICD-10-CM

## 2017-02-28 DIAGNOSIS — E66.9 OBESITY, UNSPECIFIED: ICD-10-CM

## 2017-02-28 DIAGNOSIS — I25.118 ATHEROSCLEROTIC HEART DISEASE OF NATIVE CORONARY ARTERY WITH OTHER FORMS OF ANGINA PECTORIS: ICD-10-CM

## 2017-02-28 DIAGNOSIS — Z79.82 LONG TERM (CURRENT) USE OF ASPIRIN: ICD-10-CM

## 2017-02-28 DIAGNOSIS — N18.3 CHRONIC KIDNEY DISEASE, STAGE 3 (MODERATE): ICD-10-CM

## 2017-02-28 DIAGNOSIS — I12.9 HYPERTENSIVE CHRONIC KIDNEY DISEASE WITH STAGE 1 THROUGH STAGE 4 CHRONIC KIDNEY DISEASE, OR UNSPECIFIED CHRONIC KIDNEY DISEASE: ICD-10-CM

## 2017-02-28 DIAGNOSIS — Z79.84 LONG TERM (CURRENT) USE OF ORAL HYPOGLYCEMIC DRUGS: ICD-10-CM

## 2017-02-28 DIAGNOSIS — R94.39 ABNORMAL RESULT OF OTHER CARDIOVASCULAR FUNCTION STUDY: ICD-10-CM

## 2017-02-28 DIAGNOSIS — N17.9 ACUTE KIDNEY FAILURE, UNSPECIFIED: ICD-10-CM

## 2017-02-28 DIAGNOSIS — E78.5 HYPERLIPIDEMIA, UNSPECIFIED: ICD-10-CM

## 2017-02-28 DIAGNOSIS — Z95.5 PRESENCE OF CORONARY ANGIOPLASTY IMPLANT AND GRAFT: ICD-10-CM

## 2017-02-28 PROBLEM — I10 ESSENTIAL (PRIMARY) HYPERTENSION: Chronic | Status: ACTIVE | Noted: 2017-02-16

## 2017-02-28 PROBLEM — E11.9 TYPE 2 DIABETES MELLITUS WITHOUT COMPLICATIONS: Chronic | Status: ACTIVE | Noted: 2017-02-16

## 2017-03-01 ENCOUNTER — APPOINTMENT (OUTPATIENT)
Dept: CARDIOTHORACIC SURGERY | Facility: CLINIC | Age: 67
End: 2017-03-01

## 2017-03-01 ENCOUNTER — OUTPATIENT (OUTPATIENT)
Dept: OUTPATIENT SERVICES | Facility: HOSPITAL | Age: 67
LOS: 1 days | End: 2017-03-01
Payer: MEDICARE

## 2017-03-01 VITALS
HEART RATE: 84 BPM | RESPIRATION RATE: 18 BRPM | OXYGEN SATURATION: 95 % | TEMPERATURE: 97.2 F | SYSTOLIC BLOOD PRESSURE: 119 MMHG | BODY MASS INDEX: 34.1 KG/M2 | HEIGHT: 68 IN | DIASTOLIC BLOOD PRESSURE: 62 MMHG | WEIGHT: 225 LBS

## 2017-03-01 DIAGNOSIS — Z95.5 PRESENCE OF CORONARY ANGIOPLASTY IMPLANT AND GRAFT: Chronic | ICD-10-CM

## 2017-03-01 DIAGNOSIS — I35.0 NONRHEUMATIC AORTIC (VALVE) STENOSIS: ICD-10-CM

## 2017-03-01 DIAGNOSIS — Z09 ENCOUNTER FOR FOLLOW-UP EXAMINATION AFTER COMPLETED TREATMENT FOR CONDITIONS OTHER THAN MALIGNANT NEOPLASM: ICD-10-CM

## 2017-03-01 LAB
ALBUMIN SERPL ELPH-MCNC: 3.4 G/DL — SIGNIFICANT CHANGE UP (ref 3.4–5)
ALP SERPL-CCNC: 56 U/L — SIGNIFICANT CHANGE UP (ref 40–120)
ALT FLD-CCNC: 28 U/L — SIGNIFICANT CHANGE UP (ref 12–42)
ANION GAP SERPL CALC-SCNC: 10 MMOL/L — SIGNIFICANT CHANGE UP (ref 9–16)
AST SERPL-CCNC: 26 U/L — SIGNIFICANT CHANGE UP (ref 15–37)
BASOPHILS NFR BLD AUTO: 0.4 % — SIGNIFICANT CHANGE UP (ref 0–2)
BILIRUB SERPL-MCNC: 0.9 MG/DL — SIGNIFICANT CHANGE UP (ref 0.2–1.2)
BUN SERPL-MCNC: 39 MG/DL — HIGH (ref 7–23)
CALCIUM SERPL-MCNC: 8.8 MG/DL — SIGNIFICANT CHANGE UP (ref 8.5–10.5)
CHLORIDE SERPL-SCNC: 103 MMOL/L — SIGNIFICANT CHANGE UP (ref 96–108)
CO2 SERPL-SCNC: 24 MMOL/L — SIGNIFICANT CHANGE UP (ref 22–31)
CREAT SERPL-MCNC: 1.2 MG/DL — SIGNIFICANT CHANGE UP (ref 0.5–1.3)
EOSINOPHIL NFR BLD AUTO: 4.4 % — SIGNIFICANT CHANGE UP (ref 0–6)
GLUCOSE SERPL-MCNC: 166 MG/DL — HIGH (ref 70–99)
HCT VFR BLD CALC: 30.6 % — LOW (ref 39–50)
HGB BLD-MCNC: 10.1 G/DL — LOW (ref 13–17)
LYMPHOCYTES # BLD AUTO: 15.9 % — SIGNIFICANT CHANGE UP (ref 13–44)
MCHC RBC-ENTMCNC: 30.2 PG — SIGNIFICANT CHANGE UP (ref 27–34)
MCHC RBC-ENTMCNC: 33 G/DL — SIGNIFICANT CHANGE UP (ref 32–36)
MCV RBC AUTO: 91.6 FL — SIGNIFICANT CHANGE UP (ref 80–100)
MONOCYTES NFR BLD AUTO: 11.1 % — SIGNIFICANT CHANGE UP (ref 2–14)
NEUTROPHILS NFR BLD AUTO: 68.2 % — SIGNIFICANT CHANGE UP (ref 43–77)
PLATELET # BLD AUTO: 454 K/UL — HIGH (ref 150–400)
POTASSIUM SERPL-MCNC: 4.1 MMOL/L — SIGNIFICANT CHANGE UP (ref 3.5–5.3)
POTASSIUM SERPL-SCNC: 4.1 MMOL/L — SIGNIFICANT CHANGE UP (ref 3.5–5.3)
PROT SERPL-MCNC: 7 G/DL — SIGNIFICANT CHANGE UP (ref 6.4–8.2)
RBC # BLD: 3.34 M/UL — LOW (ref 4.2–5.8)
RBC # FLD: 14.1 % — SIGNIFICANT CHANGE UP (ref 10.3–16.9)
SODIUM SERPL-SCNC: 137 MMOL/L — SIGNIFICANT CHANGE UP (ref 135–145)
WBC # BLD: 9.6 K/UL — SIGNIFICANT CHANGE UP (ref 3.8–10.5)
WBC # FLD AUTO: 9.6 K/UL — SIGNIFICANT CHANGE UP (ref 3.8–10.5)

## 2017-03-01 PROCEDURE — 85025 COMPLETE CBC W/AUTO DIFF WBC: CPT

## 2017-03-01 PROCEDURE — 36415 COLL VENOUS BLD VENIPUNCTURE: CPT

## 2017-03-01 PROCEDURE — 80053 COMPREHEN METABOLIC PANEL: CPT

## 2017-03-01 RX ORDER — CLOPIDOGREL BISULFATE 75 MG/1
75 TABLET, FILM COATED ORAL DAILY
Qty: 30 | Refills: 5 | Status: DISCONTINUED | COMMUNITY
End: 2017-03-01

## 2017-03-01 RX ORDER — ACETAMINOPHEN 325 MG/1
325 TABLET ORAL
Refills: 0 | Status: DISCONTINUED | COMMUNITY
End: 2017-03-01

## 2017-03-07 ENCOUNTER — APPOINTMENT (OUTPATIENT)
Dept: HEART AND VASCULAR | Facility: CLINIC | Age: 67
End: 2017-03-07

## 2017-03-07 VITALS
BODY MASS INDEX: 33.65 KG/M2 | HEART RATE: 87 BPM | SYSTOLIC BLOOD PRESSURE: 132 MMHG | WEIGHT: 222 LBS | HEIGHT: 68 IN | DIASTOLIC BLOOD PRESSURE: 69 MMHG

## 2017-03-07 RX ORDER — POTASSIUM CHLORIDE 1500 MG/1
20 TABLET, FILM COATED, EXTENDED RELEASE ORAL DAILY
Qty: 30 | Refills: 0 | Status: DISCONTINUED | COMMUNITY
End: 2017-03-06

## 2017-03-07 RX ORDER — OXYCODONE HYDROCHLORIDE AND ACETAMINOPHEN 5; 325 MG/1; MG/1
5-325 TABLET ORAL
Refills: 0 | Status: DISCONTINUED | COMMUNITY
End: 2017-03-07

## 2017-03-27 ENCOUNTER — RX RENEWAL (OUTPATIENT)
Age: 67
End: 2017-03-27

## 2017-04-03 ENCOUNTER — APPOINTMENT (OUTPATIENT)
Dept: CARDIOTHORACIC SURGERY | Facility: CLINIC | Age: 67
End: 2017-04-03

## 2017-04-03 ENCOUNTER — APPOINTMENT (OUTPATIENT)
Dept: HEART AND VASCULAR | Facility: CLINIC | Age: 67
End: 2017-04-03

## 2017-04-03 VITALS
BODY MASS INDEX: 33.15 KG/M2 | TEMPERATURE: 97.4 F | DIASTOLIC BLOOD PRESSURE: 76 MMHG | OXYGEN SATURATION: 96 % | WEIGHT: 218 LBS | HEART RATE: 78 BPM | RESPIRATION RATE: 18 BRPM | SYSTOLIC BLOOD PRESSURE: 141 MMHG

## 2017-04-03 VITALS
DIASTOLIC BLOOD PRESSURE: 72 MMHG | BODY MASS INDEX: 33.04 KG/M2 | HEART RATE: 81 BPM | SYSTOLIC BLOOD PRESSURE: 136 MMHG | HEIGHT: 68 IN | WEIGHT: 218 LBS

## 2017-06-20 ENCOUNTER — APPOINTMENT (OUTPATIENT)
Dept: HEART AND VASCULAR | Facility: CLINIC | Age: 67
End: 2017-06-20

## 2017-06-20 VITALS
SYSTOLIC BLOOD PRESSURE: 164 MMHG | HEART RATE: 60 BPM | BODY MASS INDEX: 33.34 KG/M2 | DIASTOLIC BLOOD PRESSURE: 82 MMHG | HEIGHT: 68 IN | WEIGHT: 220 LBS

## 2017-06-20 VITALS — DIASTOLIC BLOOD PRESSURE: 90 MMHG | SYSTOLIC BLOOD PRESSURE: 150 MMHG

## 2017-06-20 DIAGNOSIS — Z95.1 PRESENCE OF AORTOCORONARY BYPASS GRAFT: ICD-10-CM

## 2017-06-20 RX ORDER — POTASSIUM CHLORIDE 1500 MG/1
20 TABLET, EXTENDED RELEASE ORAL
Qty: 30 | Refills: 0 | Status: DISCONTINUED | COMMUNITY
Start: 2017-03-01

## 2017-06-20 RX ORDER — BLOOD SUGAR DIAGNOSTIC
STRIP MISCELLANEOUS
Qty: 300 | Refills: 0 | Status: DISCONTINUED | COMMUNITY
Start: 2016-12-19

## 2017-06-26 ENCOUNTER — CLINICAL ADVICE (OUTPATIENT)
Age: 67
End: 2017-06-26

## 2017-08-27 ENCOUNTER — RX RENEWAL (OUTPATIENT)
Age: 67
End: 2017-08-27

## 2017-10-10 ENCOUNTER — RX RENEWAL (OUTPATIENT)
Age: 67
End: 2017-10-10

## 2017-11-20 ENCOUNTER — APPOINTMENT (OUTPATIENT)
Dept: HEART AND VASCULAR | Facility: CLINIC | Age: 67
End: 2017-11-20
Payer: MEDICARE

## 2017-11-20 VITALS
SYSTOLIC BLOOD PRESSURE: 124 MMHG | DIASTOLIC BLOOD PRESSURE: 80 MMHG | BODY MASS INDEX: 35.43 KG/M2 | HEART RATE: 84 BPM | WEIGHT: 233 LBS

## 2017-11-20 PROCEDURE — 99214 OFFICE O/P EST MOD 30 MIN: CPT | Mod: 25

## 2017-11-20 PROCEDURE — 93000 ELECTROCARDIOGRAM COMPLETE: CPT

## 2018-02-06 ENCOUNTER — RX RENEWAL (OUTPATIENT)
Age: 68
End: 2018-02-06

## 2018-05-22 ENCOUNTER — APPOINTMENT (OUTPATIENT)
Dept: HEART AND VASCULAR | Facility: CLINIC | Age: 68
End: 2018-05-22
Payer: MEDICARE

## 2018-05-22 VITALS — DIASTOLIC BLOOD PRESSURE: 70 MMHG | HEART RATE: 62 BPM | SYSTOLIC BLOOD PRESSURE: 128 MMHG

## 2018-05-22 DIAGNOSIS — R74.8 ABNORMAL LEVELS OF OTHER SERUM ENZYMES: ICD-10-CM

## 2018-05-22 PROCEDURE — 93000 ELECTROCARDIOGRAM COMPLETE: CPT

## 2018-05-22 PROCEDURE — 99214 OFFICE O/P EST MOD 30 MIN: CPT | Mod: 25

## 2018-05-22 PROCEDURE — 93923 UPR/LXTR ART STDY 3+ LVLS: CPT

## 2018-05-22 RX ORDER — ATORVASTATIN CALCIUM 80 MG/1
80 TABLET, FILM COATED ORAL
Qty: 90 | Refills: 0 | Status: DISCONTINUED | COMMUNITY
Start: 2018-02-06 | End: 2018-05-22

## 2018-05-22 RX ORDER — ATORVASTATIN CALCIUM 80 MG/1
80 TABLET, FILM COATED ORAL
Qty: 1 | Refills: 1 | Status: DISCONTINUED | COMMUNITY
Start: 2017-11-20 | End: 2018-05-22

## 2018-06-05 ENCOUNTER — CLINICAL ADVICE (OUTPATIENT)
Age: 68
End: 2018-06-05

## 2018-06-05 RX ORDER — ROSUVASTATIN CALCIUM 40 MG/1
40 TABLET, FILM COATED ORAL DAILY
Qty: 1 | Refills: 3 | Status: DISCONTINUED | COMMUNITY
Start: 2018-05-22 | End: 2018-06-05

## 2018-06-26 ENCOUNTER — MEDICATION RENEWAL (OUTPATIENT)
Age: 68
End: 2018-06-26

## 2018-07-12 NOTE — PATIENT PROFILE ADULT. - FUNCTIONAL SCREEN CURRENT LEVEL: TOILETING, MLM
0724  Pt is awake, alert, oriented x 4. Sitting in chair. PICOdressing in place and light is blinking. With mepilex dressings to NILTON and HVAC sites. Drains with serosanguinous drainage. BP this morning is 91/54. Pt without complaint of dizziness or light headedness. On IV LR at 125 ml/hr. Seen by JULIO C Pittman and aware of low BP. As per PA,dressing to be changed by PA tomorrow. Hvac to be removed tomorrow and pt to go home with NILTON and be removed by [facility] on Monday. Pain level 7/10 after working with PT. Ice pack applied to right hip.  0916  Pt was seen by PT. Pt ambulated with PT in hallway and stairs. Pt was cleared by PT for discharge. Lungs are clear. Abdomen soft with active BS.    1100  Pt medicated with Norco 5/325 mg 2 tabs po for pain level 9/10.    12:29 PM   Pain level 9/10. Pt returned to bed. Medicated with Toradol 15 mg IV for pain level 9/10.    1:21 PM   Pt able to doze off briefly. Now awake with pain level 8/10. Offered Dilaudid for breakthrough pain. Pt refused at this time. 2:20 PM  Pt ambulated in hallway and stairs with her sister. Did well with ambulation. 3:45 PM  Resting in bed. Sister at bedside. Pain level 8/10. Norco5/325 2 tabs po given. 4:30 PM   Pt's pain is more severe. Pain starts from right thigh and goes down to outer shin. Denies calf pain and able to dorsiflex right foot. Medicated with dilaudid 0.5 mg IV. (0) independent

## 2018-09-06 ENCOUNTER — MEDICATION RENEWAL (OUTPATIENT)
Age: 68
End: 2018-09-06

## 2018-10-04 ENCOUNTER — APPOINTMENT (OUTPATIENT)
Dept: HEART AND VASCULAR | Facility: CLINIC | Age: 68
End: 2018-10-04
Payer: MEDICARE

## 2018-10-04 VITALS
HEIGHT: 68 IN | BODY MASS INDEX: 36.07 KG/M2 | HEART RATE: 63 BPM | WEIGHT: 238 LBS | SYSTOLIC BLOOD PRESSURE: 142 MMHG | DIASTOLIC BLOOD PRESSURE: 80 MMHG

## 2018-10-04 PROCEDURE — 93306 TTE W/DOPPLER COMPLETE: CPT

## 2018-10-04 PROCEDURE — 93000 ELECTROCARDIOGRAM COMPLETE: CPT

## 2018-10-04 PROCEDURE — 99214 OFFICE O/P EST MOD 30 MIN: CPT | Mod: 25

## 2018-10-04 RX ORDER — PRAVASTATIN SODIUM 40 MG/1
40 TABLET ORAL DAILY
Qty: 1 | Refills: 1 | Status: DISCONTINUED | COMMUNITY
Start: 2018-06-05 | End: 2018-10-04

## 2018-10-18 ENCOUNTER — CLINICAL ADVICE (OUTPATIENT)
Age: 68
End: 2018-10-18

## 2019-02-28 ENCOUNTER — NON-APPOINTMENT (OUTPATIENT)
Age: 69
End: 2019-02-28

## 2019-02-28 ENCOUNTER — APPOINTMENT (OUTPATIENT)
Dept: HEART AND VASCULAR | Facility: CLINIC | Age: 69
End: 2019-02-28
Payer: MEDICARE

## 2019-02-28 VITALS
DIASTOLIC BLOOD PRESSURE: 80 MMHG | BODY MASS INDEX: 37.44 KG/M2 | HEIGHT: 68 IN | WEIGHT: 247 LBS | HEART RATE: 72 BPM | SYSTOLIC BLOOD PRESSURE: 132 MMHG

## 2019-02-28 PROCEDURE — 99214 OFFICE O/P EST MOD 30 MIN: CPT | Mod: 25

## 2019-02-28 PROCEDURE — 93000 ELECTROCARDIOGRAM COMPLETE: CPT

## 2019-02-28 PROCEDURE — 36415 COLL VENOUS BLD VENIPUNCTURE: CPT

## 2019-02-28 RX ORDER — EVOLOCUMAB 140 MG/ML
140 INJECTION, SOLUTION SUBCUTANEOUS
Qty: 8 | Refills: 1 | Status: DISCONTINUED | COMMUNITY
Start: 2018-10-04 | End: 2019-02-28

## 2019-02-28 NOTE — DISCUSSION/SUMMARY
[FreeTextEntry1] : EKG:NSR\par cont Livalo for lipids, would add Zetia if CPK good\par as cough occasional and minimal will maintain zestril for hptn;DM f/u with PCP\par asa  + metoprolol CAD\par reviewed labs done 2/14/19:\par .8mg%, TC= 218mg%\par if start zetia will need frequent labs(RX given)

## 2019-02-28 NOTE — HISTORY OF PRESENT ILLNESS
[FreeTextEntry1] : never started Losartan, Repatha was too expensive and on Livalo. no cp,sob- gets occasional cough. still with chronic mild caludication bilaterally

## 2019-02-28 NOTE — REVIEW OF SYSTEMS
[Recent Weight Gain (___ Lbs)] : recent [unfilled] ~Ulb weight gain [Eyeglasses] : currently wearing eyeglasses [Eyesight Problems] : eyesight problems [see HPI] : see HPI [Cough] : cough [Nocturia] : nocturia [Muscle Cramps] : muscle cramps [Negative] : Heme/Lymph [Fever] : no fever [Headache] : no headache [Chills] : no chills [Feeling Fatigued] : not feeling fatigued [Recent Weight Loss (___ Lbs)] : no recent weight loss [Loss Of Hearing] : no hearing loss [Wheezing] : no wheezing [Coughing Up Blood] : no hemoptysis [Hematuria] : no hematuria [Joint Pain] : no joint pain [Confusion] : no confusion was observed [Memory Lapses Or Loss] : no memory lapses or loss [Depression] : no depression [Anxiety] : no anxiety [Under Stress] : not under stress [Suicidal] : not suicidal

## 2019-02-28 NOTE — PHYSICAL EXAM
[General Appearance - Well Developed] : well developed [Normal Appearance] : normal appearance [Well Groomed] : well groomed [General Appearance - Well Nourished] : well nourished [No Deformities] : no deformities [General Appearance - In No Acute Distress] : no acute distress [Normal Conjunctiva] : the conjunctiva exhibited no abnormalities [Normal Oral Mucosa] : normal oral mucosa [Normal Jugular Venous A Waves Present] : normal jugular venous A waves present [Normal Jugular Venous V Waves Present] : normal jugular venous V waves present [No Jugular Venous Rice A Waves] : no jugular venous rice A waves [] : no respiratory distress [Respiration, Rhythm And Depth] : normal respiratory rhythm and effort [Exaggerated Use Of Accessory Muscles For Inspiration] : no accessory muscle use [Auscultation Breath Sounds / Voice Sounds] : lungs were clear to auscultation bilaterally [Heart Rate And Rhythm] : heart rate and rhythm were normal [Heart Sounds] : normal S1 and S2 [Murmurs] : no murmurs present [Bowel Sounds] : normal bowel sounds [Abdomen Soft] : soft [Abdomen Tenderness] : non-tender [Abnormal Walk] : normal gait [Nail Clubbing] : no clubbing of the fingernails [Skin Color & Pigmentation] : normal skin color and pigmentation [Oriented To Time, Place, And Person] : oriented to person, place, and time [FreeTextEntry1] : trace bipedal edema

## 2019-03-01 ENCOUNTER — MEDICATION RENEWAL (OUTPATIENT)
Age: 69
End: 2019-03-01

## 2019-03-01 ENCOUNTER — RX RENEWAL (OUTPATIENT)
Age: 69
End: 2019-03-01

## 2019-03-01 RX ORDER — PITAVASTATIN CALCIUM 4.18 MG/1
4 TABLET, FILM COATED ORAL
Refills: 0 | Status: DISCONTINUED | COMMUNITY
End: 2019-03-01

## 2019-03-03 LAB — CK SERPL-CCNC: 260 U/L

## 2019-03-15 ENCOUNTER — RX RENEWAL (OUTPATIENT)
Age: 69
End: 2019-03-15

## 2019-03-15 RX ORDER — LISINOPRIL 20 MG/1
20 TABLET ORAL DAILY
Qty: 30 | Refills: 1 | Status: DISCONTINUED | COMMUNITY
Start: 2017-06-26 | End: 2019-03-15

## 2019-06-25 ENCOUNTER — NON-APPOINTMENT (OUTPATIENT)
Age: 69
End: 2019-06-25

## 2019-06-25 ENCOUNTER — APPOINTMENT (OUTPATIENT)
Dept: HEART AND VASCULAR | Facility: CLINIC | Age: 69
End: 2019-06-25
Payer: MEDICARE

## 2019-06-25 VITALS
HEIGHT: 68 IN | DIASTOLIC BLOOD PRESSURE: 78 MMHG | BODY MASS INDEX: 37.13 KG/M2 | SYSTOLIC BLOOD PRESSURE: 142 MMHG | WEIGHT: 245 LBS | HEART RATE: 68 BPM | OXYGEN SATURATION: 98 %

## 2019-06-25 PROCEDURE — 93000 ELECTROCARDIOGRAM COMPLETE: CPT

## 2019-06-25 PROCEDURE — 99214 OFFICE O/P EST MOD 30 MIN: CPT | Mod: 25

## 2019-06-25 RX ORDER — LOSARTAN POTASSIUM 50 MG/1
50 TABLET, FILM COATED ORAL DAILY
Qty: 90 | Refills: 1 | Status: DISCONTINUED | COMMUNITY
Start: 2019-03-15 | End: 2019-06-25

## 2019-06-25 NOTE — REVIEW OF SYSTEMS
[Recent Weight Gain (___ Lbs)] : recent [unfilled] ~Ulb weight gain [Eyeglasses] : currently wearing eyeglasses [Eyesight Problems] : eyesight problems [see HPI] : see HPI [Cough] : cough [Nocturia] : nocturia [Muscle Cramps] : muscle cramps [Negative] : Endocrine [Fever] : no fever [Headache] : no headache [Chills] : no chills [Feeling Fatigued] : not feeling fatigued [Recent Weight Loss (___ Lbs)] : no recent weight loss [Loss Of Hearing] : no hearing loss [Wheezing] : no wheezing [Coughing Up Blood] : no hemoptysis [Hematuria] : no hematuria [Joint Pain] : no joint pain [Confusion] : no confusion was observed [Memory Lapses Or Loss] : no memory lapses or loss [Depression] : no depression [Anxiety] : no anxiety [Under Stress] : not under stress [Suicidal] : not suicidal

## 2019-06-25 NOTE — DISCUSSION/SUMMARY
[FreeTextEntry1] : EKG:NSR,PRWP\par ,first degree AVB\par advised to f/u with PCP for persistent cellulitis- has cough- not CHF( had echo) /? [facility]- will change to norvasc( for hptn) and they will call me\par reviewed labs- LDL= 122.6=- target LDL is <70mg%- will add Repatha and repeat labs in 6-8 weeks( may be able to stop Zetia) cont Zocor for lipids\par  dm f/u with PCP;\par PVD is stable-

## 2019-06-25 NOTE — HISTORY OF PRESENT ILLNESS
[FreeTextEntry1] : was in hospital and on abx for LLE cellulitis, today was last day( was on Cefadroxil 500mg BID; Adoxa 100mg)\par no cp,sob- claudication stable( was able to walk > 1 mile)\par has cough(chronic) states was told of " water in lungs" but not given anything for it

## 2019-06-25 NOTE — PHYSICAL EXAM
[General Appearance - Well Developed] : well developed [Normal Appearance] : normal appearance [Well Groomed] : well groomed [General Appearance - Well Nourished] : well nourished [No Deformities] : no deformities [General Appearance - In No Acute Distress] : no acute distress [Normal Conjunctiva] : the conjunctiva exhibited no abnormalities [Normal Oral Mucosa] : normal oral mucosa [Normal Jugular Venous A Waves Present] : normal jugular venous A waves present [Normal Jugular Venous V Waves Present] : normal jugular venous V waves present [No Jugular Venous Rice A Waves] : no jugular venous rice A waves [] : no respiratory distress [Respiration, Rhythm And Depth] : normal respiratory rhythm and effort [Exaggerated Use Of Accessory Muscles For Inspiration] : no accessory muscle use [Auscultation Breath Sounds / Voice Sounds] : lungs were clear to auscultation bilaterally [Heart Rate And Rhythm] : heart rate and rhythm were normal [Heart Sounds] : normal S1 and S2 [Murmurs] : no murmurs present [Abdomen Soft] : soft [Bowel Sounds] : normal bowel sounds [Abdomen Tenderness] : non-tender [Abnormal Walk] : normal gait [Nail Clubbing] : no clubbing of the fingernails [Oriented To Time, Place, And Person] : oriented to person, place, and time [Skin Color & Pigmentation] : normal skin color and pigmentation [FreeTextEntry1] : LLE redness ,mild swelling

## 2019-07-01 RX ORDER — AMLODIPINE BESYLATE 5 MG/1
5 TABLET ORAL DAILY
Qty: 30 | Refills: 3 | Status: DISCONTINUED | COMMUNITY
Start: 2019-06-25 | End: 2019-07-01

## 2019-08-08 ENCOUNTER — MEDICATION RENEWAL (OUTPATIENT)
Age: 69
End: 2019-08-08

## 2019-09-15 NOTE — PRE-OP CHECKLIST - IDENTIFICATION BAND VERIFIED
Bedside shift change report given to Emilee Leon RN (oncoming nurse) by Kingston Hatchet (offgoing nurse). Report included the following information SBAR, Kardex and MAR. done

## 2019-09-26 ENCOUNTER — APPOINTMENT (OUTPATIENT)
Dept: HEART AND VASCULAR | Facility: CLINIC | Age: 69
End: 2019-09-26

## 2019-10-07 ENCOUNTER — APPOINTMENT (OUTPATIENT)
Dept: HEART AND VASCULAR | Facility: CLINIC | Age: 69
End: 2019-10-07
Payer: MEDICARE

## 2019-10-07 ENCOUNTER — NON-APPOINTMENT (OUTPATIENT)
Age: 69
End: 2019-10-07

## 2019-10-07 VITALS — DIASTOLIC BLOOD PRESSURE: 70 MMHG | SYSTOLIC BLOOD PRESSURE: 138 MMHG

## 2019-10-07 VITALS — WEIGHT: 248 LBS | BODY MASS INDEX: 37.71 KG/M2

## 2019-10-07 VITALS
DIASTOLIC BLOOD PRESSURE: 82 MMHG | BODY MASS INDEX: 34.56 KG/M2 | HEIGHT: 68 IN | SYSTOLIC BLOOD PRESSURE: 160 MMHG | WEIGHT: 228 LBS | HEART RATE: 75 BPM

## 2019-10-07 DIAGNOSIS — R94.31 ABNORMAL ELECTROCARDIOGRAM [ECG] [EKG]: ICD-10-CM

## 2019-10-07 PROCEDURE — 93000 ELECTROCARDIOGRAM COMPLETE: CPT

## 2019-10-07 PROCEDURE — 99214 OFFICE O/P EST MOD 30 MIN: CPT | Mod: 25

## 2019-10-07 NOTE — DISCUSSION/SUMMARY
[FreeTextEntry1] : EKG:NSR, ?IWMI\par will ask repatha group if something can be done for cost of repatha- recent labs aug) LDL=87.4 cont Vytorin for lipids; asa + metoprolol for cad;dm f/u with PCP;losartan for hptn, claudication is stable- continued exercise

## 2019-10-07 NOTE — PHYSICAL EXAM
[General Appearance - Well Developed] : well developed [Normal Appearance] : normal appearance [Well Groomed] : well groomed [General Appearance - Well Nourished] : well nourished [General Appearance - In No Acute Distress] : no acute distress [No Deformities] : no deformities [Normal Conjunctiva] : the conjunctiva exhibited no abnormalities [Normal Oral Mucosa] : normal oral mucosa [Normal Jugular Venous A Waves Present] : normal jugular venous A waves present [Normal Jugular Venous V Waves Present] : normal jugular venous V waves present [No Jugular Venous Rice A Waves] : no jugular venous rice A waves [] : no respiratory distress [Respiration, Rhythm And Depth] : normal respiratory rhythm and effort [Auscultation Breath Sounds / Voice Sounds] : lungs were clear to auscultation bilaterally [Exaggerated Use Of Accessory Muscles For Inspiration] : no accessory muscle use [Heart Rate And Rhythm] : heart rate and rhythm were normal [Heart Sounds] : normal S1 and S2 [Murmurs] : no murmurs present [Bowel Sounds] : normal bowel sounds [Abdomen Soft] : soft [Abdomen Tenderness] : non-tender [Nail Clubbing] : no clubbing of the fingernails [Abnormal Walk] : normal gait [Skin Color & Pigmentation] : normal skin color and pigmentation [Oriented To Time, Place, And Person] : oriented to person, place, and time [FreeTextEntry1] : carotids w/o bruits

## 2019-10-07 NOTE — REVIEW OF SYSTEMS
[Eyesight Problems] : eyesight problems [Eyeglasses] : currently wearing eyeglasses [Leg Claudication] : intermittent leg claudication [see HPI] : see HPI [Nocturia] : nocturia [Negative] : Heme/Lymph [Headache] : no headache [Fever] : no fever [Feeling Fatigued] : not feeling fatigued [Recent Weight Gain (___ Lbs)] : no recent weight gain [Chills] : no chills [Loss Of Hearing] : no hearing loss [Recent Weight Loss (___ Lbs)] : no recent weight loss [Dyspnea on exertion] : not dyspnea during exertion [Chest  Pressure] : no chest pressure [Shortness Of Breath] : no shortness of breath [Lower Ext Edema] : no extremity edema [Palpitations] : no palpitations [Chest Pain] : no chest pain [Cough] : no cough [Coughing Up Blood] : no hemoptysis [Wheezing] : no wheezing [Joint Pain] : no joint pain [Hematuria] : no hematuria [Confusion] : no confusion was observed [Memory Lapses Or Loss] : no memory lapses or loss [Muscle Cramps] : no muscle cramps [Depression] : no depression [Anxiety] : no anxiety [Suicidal] : not suicidal [Under Stress] : not under stress

## 2019-10-07 NOTE — HISTORY OF PRESENT ILLNESS
[FreeTextEntry1] : no cp,sob- gets intermittent claudication but was able to walk > 20 ([address]) blocks on [facility]. repeat was expensive

## 2020-01-10 ENCOUNTER — RX RENEWAL (OUTPATIENT)
Age: 70
End: 2020-01-10

## 2020-05-27 ENCOUNTER — RX RENEWAL (OUTPATIENT)
Age: 70
End: 2020-05-27

## 2020-07-13 ENCOUNTER — RX RENEWAL (OUTPATIENT)
Age: 70
End: 2020-07-13

## 2020-09-17 ENCOUNTER — APPOINTMENT (OUTPATIENT)
Dept: HEART AND VASCULAR | Facility: CLINIC | Age: 70
End: 2020-09-17
Payer: MEDICARE

## 2020-09-17 ENCOUNTER — NON-APPOINTMENT (OUTPATIENT)
Age: 70
End: 2020-09-17

## 2020-09-17 VITALS
DIASTOLIC BLOOD PRESSURE: 86 MMHG | HEIGHT: 68 IN | BODY MASS INDEX: 36.98 KG/M2 | TEMPERATURE: 97 F | WEIGHT: 244 LBS | SYSTOLIC BLOOD PRESSURE: 138 MMHG | HEART RATE: 64 BPM

## 2020-09-17 VITALS — TEMPERATURE: 98 F

## 2020-09-17 DIAGNOSIS — I34.0 NONRHEUMATIC MITRAL (VALVE) INSUFFICIENCY: ICD-10-CM

## 2020-09-17 PROCEDURE — 93000 ELECTROCARDIOGRAM COMPLETE: CPT | Mod: 59

## 2020-09-17 PROCEDURE — 99214 OFFICE O/P EST MOD 30 MIN: CPT | Mod: 25

## 2020-09-17 PROCEDURE — 93923 UPR/LXTR ART STDY 3+ LVLS: CPT

## 2020-09-17 PROCEDURE — 93306 TTE W/DOPPLER COMPLETE: CPT

## 2020-09-17 RX ORDER — EVOLOCUMAB 140 MG/ML
140 INJECTION, SOLUTION SUBCUTANEOUS
Qty: 3 | Refills: 1 | Status: DISCONTINUED | COMMUNITY
Start: 2019-06-25 | End: 2020-09-17

## 2020-09-17 NOTE — DISCUSSION/SUMMARY
[FreeTextEntry1] : EKG:NSR,First degree AVB\par ECHO: AV sclerosis w/o stenosis,, normal LVEF,mild AR/TR\par JESENIA:evidence of PVD\par reviewed labs done Aug 63818; LDL=77.4mg%,JY=937cu%,HDL=45mg%\par he was advised to walk more for PVD/claudication- cont Vytorin for lipids(target LDL< 70 mg%- advised vascular evaluation\par DM f/u with PCP; cont losartan for HPTN;metoprolol for CAD

## 2020-09-17 NOTE — PHYSICAL EXAM
[General Appearance - Well Developed] : well developed [Normal Appearance] : normal appearance [Well Groomed] : well groomed [General Appearance - Well Nourished] : well nourished [No Deformities] : no deformities [General Appearance - In No Acute Distress] : no acute distress [Normal Conjunctiva] : the conjunctiva exhibited no abnormalities [Normal Oral Mucosa] : normal oral mucosa [Normal Jugular Venous A Waves Present] : normal jugular venous A waves present [Normal Jugular Venous V Waves Present] : normal jugular venous V waves present [No Jugular Venous Rice A Waves] : no jugular venous rice A waves [] : no respiratory distress [Respiration, Rhythm And Depth] : normal respiratory rhythm and effort [Exaggerated Use Of Accessory Muscles For Inspiration] : no accessory muscle use [Auscultation Breath Sounds / Voice Sounds] : lungs were clear to auscultation bilaterally [Heart Rate And Rhythm] : heart rate and rhythm were normal [Heart Sounds] : normal S1 and S2 [Murmurs] : no murmurs present [Bowel Sounds] : normal bowel sounds [Abdomen Soft] : soft [Abdomen Tenderness] : non-tender [Abnormal Walk] : normal gait [Nail Clubbing] : no clubbing of the fingernails [Skin Color & Pigmentation] : normal skin color and pigmentation [Oriented To Time, Place, And Person] : oriented to person, place, and time [FreeTextEntry1] : decreased peripheral pulses

## 2020-09-17 NOTE — REVIEW OF SYSTEMS
[Eyeglasses] : currently wearing eyeglasses [see HPI] : see HPI [Leg Claudication] : intermittent leg claudication [Nocturia] : nocturia [Negative] : Heme/Lymph [Fever] : no fever [Headache] : no headache [Recent Weight Gain (___ Lbs)] : no recent weight gain [Chills] : no chills [Feeling Fatigued] : not feeling fatigued [Recent Weight Loss (___ Lbs)] : no recent weight loss [Loss Of Hearing] : no hearing loss [Shortness Of Breath] : no shortness of breath [Dyspnea on exertion] : not dyspnea during exertion [Chest  Pressure] : no chest pressure [Chest Pain] : no chest pain [Lower Ext Edema] : no extremity edema [Palpitations] : no palpitations [Cough] : no cough [Wheezing] : no wheezing [Coughing Up Blood] : no hemoptysis [Hematuria] : no hematuria [Joint Pain] : no joint pain [Muscle Cramps] : no muscle cramps [Confusion] : no confusion was observed [Memory Lapses Or Loss] : no memory lapses or loss [Depression] : no depression [Anxiety] : no anxiety [Under Stress] : not under stress [Suicidal] : not suicidal

## 2020-11-22 ENCOUNTER — NON-APPOINTMENT (OUTPATIENT)
Age: 70
End: 2020-11-22

## 2020-11-22 ENCOUNTER — INPATIENT (INPATIENT)
Facility: HOSPITAL | Age: 70
LOS: 1 days | Discharge: HOME | DRG: 682 | End: 2020-11-24
Attending: INTERNAL MEDICINE | Admitting: INTERNAL MEDICINE
Payer: MEDICARE

## 2020-11-22 VITALS
HEART RATE: 70 BPM | OXYGEN SATURATION: 97 % | SYSTOLIC BLOOD PRESSURE: 232 MMHG | RESPIRATION RATE: 20 BRPM | HEIGHT: 68 IN | DIASTOLIC BLOOD PRESSURE: 104 MMHG | TEMPERATURE: 98 F

## 2020-11-22 DIAGNOSIS — E78.2 MIXED HYPERLIPIDEMIA: ICD-10-CM

## 2020-11-22 DIAGNOSIS — Z95.5 PRESENCE OF CORONARY ANGIOPLASTY IMPLANT AND GRAFT: Chronic | ICD-10-CM

## 2020-11-22 DIAGNOSIS — I16.0 HYPERTENSIVE URGENCY: ICD-10-CM

## 2020-11-22 DIAGNOSIS — L03.116 CELLULITIS OF LEFT LOWER LIMB: ICD-10-CM

## 2020-11-22 DIAGNOSIS — Z95.1 PRESENCE OF AORTOCORONARY BYPASS GRAFT: Chronic | ICD-10-CM

## 2020-11-22 DIAGNOSIS — I25.10 ATHEROSCLEROTIC HEART DISEASE OF NATIVE CORONARY ARTERY WITHOUT ANGINA PECTORIS: ICD-10-CM

## 2020-11-22 DIAGNOSIS — E11.9 TYPE 2 DIABETES MELLITUS WITHOUT COMPLICATIONS: ICD-10-CM

## 2020-11-22 DIAGNOSIS — N17.9 ACUTE KIDNEY FAILURE, UNSPECIFIED: ICD-10-CM

## 2020-11-22 DIAGNOSIS — I50.9 HEART FAILURE, UNSPECIFIED: ICD-10-CM

## 2020-11-22 LAB
ALBUMIN SERPL ELPH-MCNC: 3.6 G/DL — SIGNIFICANT CHANGE UP (ref 3.3–5)
ALP SERPL-CCNC: 62 U/L — SIGNIFICANT CHANGE UP (ref 40–120)
ALT FLD-CCNC: 55 U/L — HIGH (ref 10–45)
ANION GAP SERPL CALC-SCNC: 12 MMOL/L — SIGNIFICANT CHANGE UP (ref 5–17)
APTT BLD: 35.5 SEC — SIGNIFICANT CHANGE UP (ref 27.5–35.5)
AST SERPL-CCNC: 43 U/L — HIGH (ref 10–40)
BASOPHILS # BLD AUTO: 0.04 K/UL — SIGNIFICANT CHANGE UP (ref 0–0.2)
BASOPHILS NFR BLD AUTO: 0.6 % — SIGNIFICANT CHANGE UP (ref 0–2)
BILIRUB SERPL-MCNC: 0.2 MG/DL — SIGNIFICANT CHANGE UP (ref 0.2–1.2)
BUN SERPL-MCNC: 89 MG/DL — HIGH (ref 7–23)
CALCIUM SERPL-MCNC: 9.2 MG/DL — SIGNIFICANT CHANGE UP (ref 8.4–10.5)
CHLORIDE SERPL-SCNC: 106 MMOL/L — SIGNIFICANT CHANGE UP (ref 96–108)
CO2 SERPL-SCNC: 17 MMOL/L — LOW (ref 22–31)
CREAT SERPL-MCNC: 2.48 MG/DL — HIGH (ref 0.5–1.3)
EOSINOPHIL # BLD AUTO: 0.26 K/UL — SIGNIFICANT CHANGE UP (ref 0–0.5)
EOSINOPHIL NFR BLD AUTO: 4 % — SIGNIFICANT CHANGE UP (ref 0–6)
GLUCOSE SERPL-MCNC: 163 MG/DL — HIGH (ref 70–99)
HCT VFR BLD CALC: 34.6 % — LOW (ref 39–50)
HGB BLD-MCNC: 10.8 G/DL — LOW (ref 13–17)
IMM GRANULOCYTES NFR BLD AUTO: 0.5 % — SIGNIFICANT CHANGE UP (ref 0–1.5)
INR BLD: 0.96 — SIGNIFICANT CHANGE UP (ref 0.88–1.16)
LYMPHOCYTES # BLD AUTO: 1.21 K/UL — SIGNIFICANT CHANGE UP (ref 1–3.3)
LYMPHOCYTES # BLD AUTO: 18.8 % — SIGNIFICANT CHANGE UP (ref 13–44)
MCHC RBC-ENTMCNC: 30.3 PG — SIGNIFICANT CHANGE UP (ref 27–34)
MCHC RBC-ENTMCNC: 31.2 GM/DL — LOW (ref 32–36)
MCV RBC AUTO: 96.9 FL — SIGNIFICANT CHANGE UP (ref 80–100)
MONOCYTES # BLD AUTO: 0.81 K/UL — SIGNIFICANT CHANGE UP (ref 0–0.9)
MONOCYTES NFR BLD AUTO: 12.6 % — SIGNIFICANT CHANGE UP (ref 2–14)
NEUTROPHILS # BLD AUTO: 4.09 K/UL — SIGNIFICANT CHANGE UP (ref 1.8–7.4)
NEUTROPHILS NFR BLD AUTO: 63.5 % — SIGNIFICANT CHANGE UP (ref 43–77)
NRBC # BLD: 0 /100 WBCS — SIGNIFICANT CHANGE UP (ref 0–0)
NT-PROBNP SERPL-SCNC: 4373 PG/ML — HIGH (ref 0–300)
PLATELET # BLD AUTO: 254 K/UL — SIGNIFICANT CHANGE UP (ref 150–400)
POTASSIUM SERPL-MCNC: 5.5 MMOL/L — HIGH (ref 3.5–5.3)
POTASSIUM SERPL-SCNC: 5.5 MMOL/L — HIGH (ref 3.5–5.3)
PROT SERPL-MCNC: 6.8 G/DL — SIGNIFICANT CHANGE UP (ref 6–8.3)
PROTHROM AB SERPL-ACNC: 11.5 SEC — SIGNIFICANT CHANGE UP (ref 10.6–13.6)
RBC # BLD: 3.57 M/UL — LOW (ref 4.2–5.8)
RBC # FLD: 14.8 % — HIGH (ref 10.3–14.5)
SODIUM SERPL-SCNC: 135 MMOL/L — SIGNIFICANT CHANGE UP (ref 135–145)
TROPONIN T SERPL-MCNC: <0.01 NG/ML — SIGNIFICANT CHANGE UP (ref 0–0.01)
WBC # BLD: 6.44 K/UL — SIGNIFICANT CHANGE UP (ref 3.8–10.5)
WBC # FLD AUTO: 6.44 K/UL — SIGNIFICANT CHANGE UP (ref 3.8–10.5)

## 2020-11-22 PROCEDURE — 99291 CRITICAL CARE FIRST HOUR: CPT | Mod: CS

## 2020-11-22 PROCEDURE — 71045 X-RAY EXAM CHEST 1 VIEW: CPT | Mod: 26

## 2020-11-22 RX ORDER — SODIUM CHLORIDE 9 MG/ML
1000 INJECTION, SOLUTION INTRAVENOUS
Refills: 0 | Status: DISCONTINUED | OUTPATIENT
Start: 2020-11-22 | End: 2020-11-24

## 2020-11-22 RX ORDER — DEXTROSE 50 % IN WATER 50 %
25 SYRINGE (ML) INTRAVENOUS ONCE
Refills: 0 | Status: DISCONTINUED | OUTPATIENT
Start: 2020-11-22 | End: 2020-11-24

## 2020-11-22 RX ORDER — DEXTROSE 50 % IN WATER 50 %
15 SYRINGE (ML) INTRAVENOUS ONCE
Refills: 0 | Status: DISCONTINUED | OUTPATIENT
Start: 2020-11-22 | End: 2020-11-24

## 2020-11-22 RX ORDER — ATORVASTATIN CALCIUM 80 MG/1
40 TABLET, FILM COATED ORAL AT BEDTIME
Refills: 0 | Status: DISCONTINUED | OUTPATIENT
Start: 2020-11-23 | End: 2020-11-24

## 2020-11-22 RX ORDER — GLUCAGON INJECTION, SOLUTION 0.5 MG/.1ML
1 INJECTION, SOLUTION SUBCUTANEOUS ONCE
Refills: 0 | Status: DISCONTINUED | OUTPATIENT
Start: 2020-11-22 | End: 2020-11-24

## 2020-11-22 RX ORDER — ISOSORBIDE MONONITRATE 60 MG/1
30 TABLET, EXTENDED RELEASE ORAL DAILY
Refills: 0 | Status: DISCONTINUED | OUTPATIENT
Start: 2020-11-22 | End: 2020-11-23

## 2020-11-22 RX ORDER — METOPROLOL TARTRATE 50 MG
25 TABLET ORAL
Refills: 0 | Status: DISCONTINUED | OUTPATIENT
Start: 2020-11-22 | End: 2020-11-24

## 2020-11-22 RX ORDER — NITROGLYCERIN 6.5 MG
1 CAPSULE, EXTENDED RELEASE ORAL ONCE
Refills: 0 | Status: COMPLETED | OUTPATIENT
Start: 2020-11-22 | End: 2020-11-22

## 2020-11-22 RX ORDER — FUROSEMIDE 40 MG
20 TABLET ORAL ONCE
Refills: 0 | Status: COMPLETED | OUTPATIENT
Start: 2020-11-22 | End: 2020-11-22

## 2020-11-22 RX ORDER — DEXTROSE 50 % IN WATER 50 %
12.5 SYRINGE (ML) INTRAVENOUS ONCE
Refills: 0 | Status: DISCONTINUED | OUTPATIENT
Start: 2020-11-22 | End: 2020-11-24

## 2020-11-22 RX ORDER — INSULIN LISPRO 100/ML
VIAL (ML) SUBCUTANEOUS
Refills: 0 | Status: DISCONTINUED | OUTPATIENT
Start: 2020-11-22 | End: 2020-11-24

## 2020-11-22 RX ORDER — ASPIRIN/CALCIUM CARB/MAGNESIUM 324 MG
81 TABLET ORAL DAILY
Refills: 0 | Status: DISCONTINUED | OUTPATIENT
Start: 2020-11-22 | End: 2020-11-24

## 2020-11-22 RX ORDER — FUROSEMIDE 40 MG
40 TABLET ORAL
Refills: 0 | Status: DISCONTINUED | OUTPATIENT
Start: 2020-11-23 | End: 2020-11-24

## 2020-11-22 RX ORDER — SIMVASTATIN 20 MG/1
40 TABLET, FILM COATED ORAL AT BEDTIME
Refills: 0 | Status: DISCONTINUED | OUTPATIENT
Start: 2020-11-22 | End: 2020-11-22

## 2020-11-22 RX ADMIN — Medication 20 MILLIGRAM(S): at 21:54

## 2020-11-22 RX ADMIN — ISOSORBIDE MONONITRATE 30 MILLIGRAM(S): 60 TABLET, EXTENDED RELEASE ORAL at 23:57

## 2020-11-22 RX ADMIN — Medication 1 INCH(S): at 21:54

## 2020-11-22 NOTE — ED ADULT NURSE NOTE - OBJECTIVE STATEMENT
BIBS accompanied by wife c/o sob on exertion. elevated BP.  Patient is ambulatory with left leg lower leg redness and swelling , right upper thigh wound with no drainage. Patient is currently on atibiotic for cellulitis. Denies CP, fevers , GI and  problems. Hx of CHF, heart stent and heart valve replacement.

## 2020-11-22 NOTE — ED PROVIDER NOTE - NS ED MD DISPO ISOLATION TYPES
Case discussed with resident at time of visit.  Patient seen and evaluated by me.  I have reviewed and concur with the resident's history, physical exam, assessment, and plan.     None

## 2020-11-22 NOTE — ED PROVIDER NOTE - PROGRESS NOTE DETAILS
Klepfish: remains asymptomatic and well appearing at rest. CXr w/ mild pulm edema. k 5.5, no hyperkalemic EKG changes. Cr 2.48, unknown most recent outpt cr. BNP 4000s. BP slowly improving. Will admit for further care. as per discussion w/  dr. bolden earlier, will admit house cards. d/w fellow/PA.

## 2020-11-22 NOTE — H&P ADULT - RS GEN PE MLT RESP DETAILS PC
airway patent/good air movement/breath sounds equal/diminished breath sounds, L/diminished breath sounds, R

## 2020-11-22 NOTE — H&P ADULT - NSHPLABSRESULTS_GEN_ALL_CORE
Serum Pro-Brain Natriuretic Peptide (11.22.20 @ 21:50)    Serum Pro-Brain Natriuretic Peptide: 4373: NT-proBNP Interpretive comments: Acute Congestive Heart Failure is  unlikely if NT-proBNP is  less than 300 pg/mL, for any age. Consider Acute Congestive Heart Failure  if:  Age                                                 NP-proBNP Result  <50 yrs                                           >450 pg/mL  50-75 yrs                                        >900 pg/mL  >75 yrs                                           >1800 pg/mL  All results require clinical correlation.  Consider obtaining a base line  or "dry" NT-proBNP level when the patient is stabilized, so that  subsequent levels can be related to that.  Patients with recurring CHF  Troponin T, Serum (11.22.20 @ 21:50)    Troponin T, Serum: <0.01: Reference interval for troponin T is </= 0.01 ng/mL which includes the  99th percentile of a healthy population. Troponin T results are not  interchangeable with troponin I results. ng/mL    may have elevated levels.  Acute failure episodes generally produce  levels at least 25% greater than baseline levels.  The above values are  derived from a large multi-center study, "SURYA Weber, et al, European  Heart Journal, 2006, 27:330-337. pg/mL

## 2020-11-22 NOTE — ED PROVIDER NOTE - CLINICAL SUMMARY MEDICAL DECISION MAKING FREE TEXT BOX
70M PMH HTN, HLD, DM2, CAD s/p multiple PCI's @H (05/24/2005 PCI to mLAD x2, pLAD and 04/18/2005 PCI to mid/prox/distal RCA, OM2), CKD, CABGx2 (2/20/17 Dr. Mckeon), COVID April 2020, p/w COTA/HTN. Primary cards Dr. Sharpe - had echo ~2mos ago grossly wnl. Not on diuretics. Pt c/o several days of worsening COTA - usually able to ambulate 4 long blocks, now can barely walk 1. Also worsening b/l LE swelling. BP ~1w ago at PMD office was ~180s. Today was 200s so called Dr. Sharpe who referred to ED. On ROS pt notes chronic R medial thigh wound. Also has RLE redness for which he was dx w/ cellulitis and started on keflex 9d ago - redness now improved. No other systemic symptoms. Hypertensive, other vitals wnl. Exam as above. No resp distress.  ddx: Clinically fluid overloaded. Possibly 2/2 HTN.   Labs, CXR, nitro, lasix.   d/w dr. sharpe, recommending admission under service. 70M PMH HTN, HLD, DM2, CAD s/p multiple PCI's @H (05/24/2005 PCI to mLAD x2, pLAD and 04/18/2005 PCI to mid/prox/distal RCA, OM2), CKD, CABGx2 (2/20/17 Dr. Mckeon), COVID April 2020, p/w COTA/HTN. Primary cards Dr. Sharpe - had echo ~2mos ago grossly wnl. Not on diuretics. Pt c/o several days of worsening COTA - usually able to ambulate 4 long blocks, now can barely walk 1. Also worsening b/l LE swelling. BP ~1w ago at PMD office was ~180s. Today was 200s so called Dr. Sharpe who referred to ED. On ROS pt notes chronic R medial thigh wound. Also has RLE redness for which he was dx w/ cellulitis and started on keflex 9d ago - redness now improved. No other systemic symptoms. Hypertensive, other vitals wnl. Exam as above. No resp distress.  ddx: Clinically fluid overloaded. Possibly 2/2 HTN.   Labs, CXR, nitro, lasix.   d/w dr. sharpe, recommending admission under service.  Clinically improving cellulitis and likely unrelated to current presentation.

## 2020-11-22 NOTE — ED PROVIDER NOTE - RECENT EXPOSURE TO
Patient believes she has a yeast infection.  She is hoping that you can get her the Fluconazole     Pharmacy - Formerly named [facility]   none known

## 2020-11-22 NOTE — H&P ADULT - PROBLEM SELECTOR PLAN 5
Cont Zocor 40  Resume Zetia on dc Lipitor 40--theraputic interchange for zocor 80  Resume Zetia on dc

## 2020-11-22 NOTE — H&P ADULT - EYES
[FreeTextEntry1] : Ms. SAMSON MENJIVAR detailed exam PERRL/EOMI/conjunctiva clear

## 2020-11-22 NOTE — H&P ADULT - ATTENDING COMMENTS
pt seen/examined- discussed with 5U staff  will get echo to evaluate LVEF and nuc stress test to r/o significant ischemia as etiology but suspect sx due to ARF

## 2020-11-22 NOTE — H&P ADULT - PROBLEM SELECTOR PLAN 3
Monitor renal function closely  Strct I+O  Avoid nephrotoxic agents Monitor renal function closely  Baseline creat 1.7  Strct I+O  Avoid nephrotoxic agents Monitor renal function closely  Baseline creat 1.7  Strct I+O  Avoid nephrotoxic agents  Hold Losartan

## 2020-11-22 NOTE — H&P ADULT - ASSESSMENT
Patient is a 70 year old male with prior history of CAD and S/P CABG who presented to [facility] ED with decompensated volume overload with unknown LV function.

## 2020-11-22 NOTE — ED PROVIDER NOTE - OBJECTIVE STATEMENT
70M PMH HTN, HLD, DM2, CAD s/p multiple PCI's @H (05/24/2005 PCI to mLAD x2, pLAD and 04/18/2005 PCI to mid/prox/distal RCA, OM2), CKD, CABGx2 (2/20/17 Dr. Mckeon), COVID April 2020, p/w COTA/HTN. Primary cards Dr. Sharpe - had echo ~2mos ago grossly wnl. Not on diuretics. Pt c/o several days of worsening COTA - usually able to ambulate 4 long blocks, now can barely walk 1. Also worsening b/l LE swelling. BP ~1w ago at PMD office was ~180s. Today was 200s so called Dr. Sharpe who referred to ED. On ROS pt notes chronic R medial thigh wound. Also has RLE redness for which he was dx w/ cellulitis and started on keflex 9d ago - redness now improved. Denies f/c, URI symptoms, HA, urinary complaints, abd pain, focal weakness/numbness, black/bloody stool. Abdomen soft, non-tender, no guarding.

## 2020-11-22 NOTE — H&P ADULT - HISTORY OF PRESENT ILLNESS
Patient is a 71 y/o M PMH HTN, HLD, DM2, CAD s/p multiple PCI's in the past and CABG x2 in 2017, chronic renal insufficiency, previous COVID-19 infection who was sent to the [facility] ED with complaints of SOB. Patient reports over the past few days his breathing has become labored and now having SOB on exertion. He is also experiencing LE edema during that same time frame. He denies any chest pain, palpitations dizziness or syncope. No fever or chills. No melena or BRBPR. He also reports his blood pressure has been in the systolic 180-200 range. His BNP was elevated at 4373 and troponin was negative. His Creat is elevated at 2.4 Potassium is 5.5. 12 Lead EKG was performed and revealed  SR with long first deg AVB, RAD and no ischemic changes. He is being admitted to [facility]. Patient is a 69 y/o M PMH HTN, HLD, DM2, CAD s/p multiple PCI's in the past and CABG x2 in 2017, chronic renal insufficiency, previous COVID-19 infection who was sent to the [facility] ED with complaints of SOB. Patient reports over the past few days his breathing has become labored and now having SOB on exertion. He is also experiencing LE edema during that same time frame. He denies any chest pain, palpitations dizziness or syncope. No fever or chills. No melena or BRBPR. He also reports his blood pressure has been in the systolic 180-200 range. His BNP was elevated at 4373 and troponin was negative. His Creat is elevated at 2.4 Potassium is 5.5. 12 Lead EKG was performed and revealed  SR with long first deg AVB, RAD and no ischemic changes. Of note he was on keflex 500 q6H for 9 days for a LE Cellulitis.  He is being admitted to [facility].

## 2020-11-22 NOTE — H&P ADULT - PROBLEM SELECTOR PLAN 2
Cont home BP medications  Titrate BP Meds as tolerated  Monitor on tele Cont home BP medications  Titrate BP Meds as tolerated  Monitor on tele  Add Imdur 30

## 2020-11-22 NOTE — H&P ADULT - PROBLEM SELECTOR PLAN 1
Decompensated CHF  Start IV lasix 40mg BID  2/2 LV dysfunction? Vs HTN urgency  Check 2D echo  Serial trops to R/O ACS

## 2020-11-22 NOTE — ED ADULT TRIAGE NOTE - OTHER COMPLAINTS
per family pt has been complaining of SOB, mostly on exertion since Friday, family also reports high BP at home, no CP/fevers/cough, hx of heart valve replacement, HTN, diabetes, currently on abx for leg cellulitits

## 2020-11-23 DIAGNOSIS — R63.8 OTHER SYMPTOMS AND SIGNS CONCERNING FOOD AND FLUID INTAKE: ICD-10-CM

## 2020-11-23 LAB
A1C WITH ESTIMATED AVERAGE GLUCOSE RESULT: 8 % — HIGH (ref 4–5.6)
ANION GAP SERPL CALC-SCNC: 10 MMOL/L — SIGNIFICANT CHANGE UP (ref 5–17)
BUN SERPL-MCNC: 88 MG/DL — HIGH (ref 7–23)
CALCIUM SERPL-MCNC: 8.7 MG/DL — SIGNIFICANT CHANGE UP (ref 8.4–10.5)
CHLORIDE SERPL-SCNC: 109 MMOL/L — HIGH (ref 96–108)
CHOLEST SERPL-MCNC: 108 MG/DL — SIGNIFICANT CHANGE UP
CO2 SERPL-SCNC: 17 MMOL/L — LOW (ref 22–31)
CREAT SERPL-MCNC: 2.48 MG/DL — HIGH (ref 0.5–1.3)
ESTIMATED AVERAGE GLUCOSE: 183 MG/DL — HIGH (ref 68–114)
GLUCOSE BLDC GLUCOMTR-MCNC: 148 MG/DL — HIGH (ref 70–99)
GLUCOSE BLDC GLUCOMTR-MCNC: 153 MG/DL — HIGH (ref 70–99)
GLUCOSE BLDC GLUCOMTR-MCNC: 211 MG/DL — HIGH (ref 70–99)
GLUCOSE SERPL-MCNC: 134 MG/DL — HIGH (ref 70–99)
HCT VFR BLD CALC: 30.7 % — LOW (ref 39–50)
HCV AB S/CO SERPL IA: 0.16 S/CO — SIGNIFICANT CHANGE UP
HCV AB SERPL-IMP: SIGNIFICANT CHANGE UP
HDLC SERPL-MCNC: 41 MG/DL — SIGNIFICANT CHANGE UP
HGB BLD-MCNC: 9.9 G/DL — LOW (ref 13–17)
LIPID PNL WITH DIRECT LDL SERPL: 45 MG/DL — SIGNIFICANT CHANGE UP
MCHC RBC-ENTMCNC: 30.5 PG — SIGNIFICANT CHANGE UP (ref 27–34)
MCHC RBC-ENTMCNC: 32.2 GM/DL — SIGNIFICANT CHANGE UP (ref 32–36)
MCV RBC AUTO: 94.5 FL — SIGNIFICANT CHANGE UP (ref 80–100)
NON HDL CHOLESTEROL: 67 MG/DL — SIGNIFICANT CHANGE UP
NRBC # BLD: 0 /100 WBCS — SIGNIFICANT CHANGE UP (ref 0–0)
PLATELET # BLD AUTO: 240 K/UL — SIGNIFICANT CHANGE UP (ref 150–400)
POTASSIUM SERPL-MCNC: 5.2 MMOL/L — SIGNIFICANT CHANGE UP (ref 3.5–5.3)
POTASSIUM SERPL-SCNC: 5.2 MMOL/L — SIGNIFICANT CHANGE UP (ref 3.5–5.3)
RBC # BLD: 3.25 M/UL — LOW (ref 4.2–5.8)
RBC # FLD: 14.8 % — HIGH (ref 10.3–14.5)
SARS-COV-2 RNA SPEC QL NAA+PROBE: SIGNIFICANT CHANGE UP
SODIUM SERPL-SCNC: 136 MMOL/L — SIGNIFICANT CHANGE UP (ref 135–145)
TRIGL SERPL-MCNC: 111 MG/DL — SIGNIFICANT CHANGE UP
TROPONIN T SERPL-MCNC: <0.01 NG/ML — SIGNIFICANT CHANGE UP (ref 0–0.01)
TSH SERPL-MCNC: 2.31 UIU/ML — SIGNIFICANT CHANGE UP (ref 0.35–4.94)
WBC # BLD: 7.02 K/UL — SIGNIFICANT CHANGE UP (ref 3.8–10.5)
WBC # FLD AUTO: 7.02 K/UL — SIGNIFICANT CHANGE UP (ref 3.8–10.5)

## 2020-11-23 PROCEDURE — 99223 1ST HOSP IP/OBS HIGH 75: CPT

## 2020-11-23 PROCEDURE — 93018 CV STRESS TEST I&R ONLY: CPT

## 2020-11-23 PROCEDURE — 78452 HT MUSCLE IMAGE SPECT MULT: CPT | Mod: 26

## 2020-11-23 PROCEDURE — 93306 TTE W/DOPPLER COMPLETE: CPT | Mod: 26

## 2020-11-23 PROCEDURE — 93016 CV STRESS TEST SUPVJ ONLY: CPT

## 2020-11-23 RX ORDER — HEPARIN SODIUM 5000 [USP'U]/ML
5000 INJECTION INTRAVENOUS; SUBCUTANEOUS EVERY 8 HOURS
Refills: 0 | Status: DISCONTINUED | OUTPATIENT
Start: 2020-11-23 | End: 2020-11-23

## 2020-11-23 RX ORDER — ISOSORBIDE MONONITRATE 60 MG/1
60 TABLET, EXTENDED RELEASE ORAL DAILY
Refills: 0 | Status: DISCONTINUED | OUTPATIENT
Start: 2020-11-24 | End: 2020-11-24

## 2020-11-23 RX ORDER — CEFAZOLIN SODIUM 1 G
1000 VIAL (EA) INJECTION EVERY 8 HOURS
Refills: 0 | Status: DISCONTINUED | OUTPATIENT
Start: 2020-11-23 | End: 2020-11-24

## 2020-11-23 RX ORDER — ISOSORBIDE MONONITRATE 60 MG/1
30 TABLET, EXTENDED RELEASE ORAL ONCE
Refills: 0 | Status: COMPLETED | OUTPATIENT
Start: 2020-11-23 | End: 2020-11-23

## 2020-11-23 RX ORDER — AMLODIPINE BESYLATE 2.5 MG/1
5 TABLET ORAL DAILY
Refills: 0 | Status: DISCONTINUED | OUTPATIENT
Start: 2020-11-23 | End: 2020-11-24

## 2020-11-23 RX ORDER — HEPARIN SODIUM 5000 [USP'U]/ML
7500 INJECTION INTRAVENOUS; SUBCUTANEOUS EVERY 8 HOURS
Refills: 0 | Status: DISCONTINUED | OUTPATIENT
Start: 2020-11-23 | End: 2020-11-24

## 2020-11-23 RX ADMIN — ATORVASTATIN CALCIUM 40 MILLIGRAM(S): 80 TABLET, FILM COATED ORAL at 21:34

## 2020-11-23 RX ADMIN — ISOSORBIDE MONONITRATE 30 MILLIGRAM(S): 60 TABLET, EXTENDED RELEASE ORAL at 15:23

## 2020-11-23 RX ADMIN — Medication 100 MILLIGRAM(S): at 23:25

## 2020-11-23 RX ADMIN — Medication 100 MILLIGRAM(S): at 15:23

## 2020-11-23 RX ADMIN — HEPARIN SODIUM 7500 UNIT(S): 5000 INJECTION INTRAVENOUS; SUBCUTANEOUS at 15:22

## 2020-11-23 RX ADMIN — Medication 81 MILLIGRAM(S): at 15:23

## 2020-11-23 RX ADMIN — Medication 25 MILLIGRAM(S): at 17:52

## 2020-11-23 RX ADMIN — Medication 1: at 22:00

## 2020-11-23 RX ADMIN — Medication 25 MILLIGRAM(S): at 06:39

## 2020-11-23 RX ADMIN — ISOSORBIDE MONONITRATE 30 MILLIGRAM(S): 60 TABLET, EXTENDED RELEASE ORAL at 17:10

## 2020-11-23 RX ADMIN — Medication 2: at 17:10

## 2020-11-23 RX ADMIN — AMLODIPINE BESYLATE 5 MILLIGRAM(S): 2.5 TABLET ORAL at 07:58

## 2020-11-23 RX ADMIN — HEPARIN SODIUM 7500 UNIT(S): 5000 INJECTION INTRAVENOUS; SUBCUTANEOUS at 21:34

## 2020-11-23 RX ADMIN — Medication 40 MILLIGRAM(S): at 17:09

## 2020-11-23 RX ADMIN — Medication 40 MILLIGRAM(S): at 06:39

## 2020-11-23 RX ADMIN — Medication 100 MILLIGRAM(S): at 07:58

## 2020-11-23 RX ADMIN — Medication 1 INCH(S): at 09:06

## 2020-11-23 NOTE — CONSULT NOTE ADULT - SUBJECTIVE AND OBJECTIVE BOX
NEPHROLOGY CONSULT NOTE    HPI: 70yoM with a PMH HTN, HLD, DM2, CAD s/p multiple PCI's in the past and CABG x2 in 2017, chronic renal insufficiency, previous COVID-19 infection who was sent to the [facility] ED with complaints of SOB. Patient reports over the past few days his breathing has become labored and now having SOB on exertion. He is also experiencing LE edema during that same time frame. On arrival had a /104. Labs significant for K 5.5, bicarb 17, BUN/Cr 89/2.8, BNP 4373. Was given 60 of lasix w/ 650cc UOP. Repeat chemistry without improvement of renal function. CXR was clear. Echo performed today without e/o heart failure. NST pending. No UA, urine lytes, or US renal available for review. Pt's baseline Cr known to be 1.7.    ROS  CONSTITUTIONAL: No weakness, fevers or chills  EYES/ENT: No visual changes;  No vertigo or throat pain   NECK: No pain or stiffness  RESPIRATORY: No cough, wheezing, hemoptysis; + shortness of breath  CARDIOVASCULAR: No chest pain or palpitations + edema  GASTROINTESTINAL: No abdominal or epigastric pain. No nausea, vomiting, or hematemesis; No diarrhea or constipation. No melena or hematochezia.  GENITOURINARY: No dysuria, frequency or hematuria  NEUROLOGICAL: No numbness or weakness  SKIN: No itching, burning, rashes, or lesions   All other review of systems is negative unless indicated above.    PMH: as above  meds: losartan 50, lopressor 50 BID, actos, glipizide, ASA/plavix, simva/zetia  PSH: as above  allergies:NKDA  SH: denies tobacco, etoh, or drug use    VITAL SIGNS:  Vital Signs Last 24 Hrs  T(C): 36.3 (23 Nov 2020 06:15), Max: 36.7 (22 Nov 2020 23:58)  T(F): 97.3 (23 Nov 2020 06:15), Max: 98.1 (22 Nov 2020 23:58)  HR: 58 (23 Nov 2020 08:40) (58 - 84)  BP: 159/78 (23 Nov 2020 08:40) (159/78 - 232/104)  BP(mean): 109 (23 Nov 2020 08:40) (107 - 125)  RR: 18 (23 Nov 2020 08:40) (14 - 20)  SpO2: 95% (23 Nov 2020 08:40) (95% - 98%)    PHYSICAL EXAM:    General: WDWN  HEENT: NCAT; PERRL, anicteric sclera; MMM  Neck: supple, trachea midline  Cardiovascular: S1, S2 normal; RRR, no M/G/R  Respiratory: CTABL; no W/R/R  Gastrointestinal: soft, nontender, nondistended. bowel sounds present.  Skin: no ulcerations or visible rashes appreciated  Extremities: WWP; no edema, clubbing or cyanosis  Vascular: 2+ radial, DP/PT pulses B/L  Neurological: AAOx3; CN II-XII grossly intact; no focal deficits    MEDICATIONS:  MEDICATIONS  (STANDING):  amLODIPine   Tablet 5 milliGRAM(s) Oral daily  aspirin  chewable 81 milliGRAM(s) Oral daily  atorvastatin 40 milliGRAM(s) Oral at bedtime  ceFAZolin   IVPB 1000 milliGRAM(s) IV Intermittent every 8 hours  dextrose 40% Gel 15 Gram(s) Oral once  dextrose 5%. 1000 milliLiter(s) (50 mL/Hr) IV Continuous <Continuous>  dextrose 5%. 1000 milliLiter(s) (100 mL/Hr) IV Continuous <Continuous>  dextrose 50% Injectable 25 Gram(s) IV Push once  dextrose 50% Injectable 12.5 Gram(s) IV Push once  dextrose 50% Injectable 25 Gram(s) IV Push once  furosemide   Injectable 40 milliGRAM(s) IV Push two times a day  glucagon  Injectable 1 milliGRAM(s) IntraMuscular once  heparin   Injectable 7500 Unit(s) SubCutaneous every 8 hours  insulin lispro (ADMELOG) corrective regimen sliding scale   SubCutaneous Before meals and at bedtime  isosorbide   mononitrate ER Tablet (IMDUR) 30 milliGRAM(s) Oral daily  metoprolol tartrate 25 milliGRAM(s) Oral two times a day    MEDICATIONS  (PRN):      ALLERGIES:  Allergies    No Known Allergies    Intolerances        LABS:                        9.9    7.02  )-----------( 240      ( 23 Nov 2020 05:46 )             30.7     11-23    136  |  109<H>  |  88<H>  ----------------------------<  134<H>  5.2   |  17<L>  |  2.48<H>    Ca    8.7      23 Nov 2020 05:46    TPro  6.8  /  Alb  3.6  /  TBili  0.2  /  DBili  x   /  AST  43<H>  /  ALT  55<H>  /  AlkPhos  62  11-22    PT/INR - ( 22 Nov 2020 21:50 )   PT: 11.5 sec;   INR: 0.96          PTT - ( 22 Nov 2020 21:50 )  PTT:35.5 sec    CAPILLARY BLOOD GLUCOSE      POCT Blood Glucose.: 148 mg/dL (23 Nov 2020 06:43)      RADIOLOGY & ADDITIONAL TESTS: Reviewed. NEPHROLOGY CONSULT NOTE    HPI: 70yoM with a PMH HTN, HLD, DM2, CAD s/p multiple PCI's in the past and CABG x2 in 2017, chronic renal insufficiency, previous COVID-19 infection who was sent to the [facility] ED with complaints of SOB. Patient reports over the past few days his breathing has become labored and now having SOB on exertion. He is also experiencing LE edema during that same time frame. On arrival had a /104. Labs significant for K 5.5, bicarb 17, BUN/Cr 89/2.8, BNP 4373. Was given 60 of lasix w/ 650cc UOP. Repeat chemistry without improvement of renal function. CXR was clear. Echo performed today without e/o heart failure. NST pending. No UA, urine lytes, or US renal available for review. Pt's baseline Cr known to be 1.7.    ROS  CONSTITUTIONAL: No weakness, fevers or chills  RESPIRATORY: No cough, wheezing, hemoptysis; + shortness of breath  CARDIOVASCULAR: No chest pain or palpitations + edema  GASTROINTESTINAL: No abdominal or epigastric pain. No nausea, vomiting, or hematemesis; No diarrhea or constipation. No melena or hematochezia.  GENITOURINARY: No dysuria, frequency or hematuria  NEUROLOGICAL: No numbness or weakness      PMH: as above  meds: losartan 50, lopressor 50 BID, actos, glipizide, ASA/plavix, simva/zetia  PSH: as above  allergies:NKDA  SH: denies tobacco, etoh, or drug use    VITAL SIGNS:  Vital Signs Last 24 Hrs  T(C): 36.3 (23 Nov 2020 06:15), Max: 36.7 (22 Nov 2020 23:58)  T(F): 97.3 (23 Nov 2020 06:15), Max: 98.1 (22 Nov 2020 23:58)  HR: 58 (23 Nov 2020 08:40) (58 - 84)  BP: 159/78 (23 Nov 2020 08:40) (159/78 - 232/104)  BP(mean): 109 (23 Nov 2020 08:40) (107 - 125)  RR: 18 (23 Nov 2020 08:40) (14 - 20)  SpO2: 95% (23 Nov 2020 08:40) (95% - 98%)    PHYSICAL EXAM:    General: WDWN  HEENT: NCAT  Cardiovascular: S1, S2 normal; RRR, no M/G/R  Respiratory: CTABL; no W/R/R  Gastrointestinal: soft, nontender, nondistended. bowel sounds present.  Skin: no ulcerations or visible rashes appreciated  Extremities: WWP; no edema  Neurological: AAOx3; CN II-XII grossly intact; no focal deficits    MEDICATIONS:  MEDICATIONS  (STANDING):  amLODIPine   Tablet 5 milliGRAM(s) Oral daily  aspirin  chewable 81 milliGRAM(s) Oral daily  atorvastatin 40 milliGRAM(s) Oral at bedtime  ceFAZolin   IVPB 1000 milliGRAM(s) IV Intermittent every 8 hours  dextrose 40% Gel 15 Gram(s) Oral once  dextrose 5%. 1000 milliLiter(s) (50 mL/Hr) IV Continuous <Continuous>  dextrose 5%. 1000 milliLiter(s) (100 mL/Hr) IV Continuous <Continuous>  dextrose 50% Injectable 25 Gram(s) IV Push once  dextrose 50% Injectable 12.5 Gram(s) IV Push once  dextrose 50% Injectable 25 Gram(s) IV Push once  furosemide   Injectable 40 milliGRAM(s) IV Push two times a day  glucagon  Injectable 1 milliGRAM(s) IntraMuscular once  heparin   Injectable 7500 Unit(s) SubCutaneous every 8 hours  insulin lispro (ADMELOG) corrective regimen sliding scale   SubCutaneous Before meals and at bedtime  isosorbide   mononitrate ER Tablet (IMDUR) 30 milliGRAM(s) Oral daily  metoprolol tartrate 25 milliGRAM(s) Oral two times a day    MEDICATIONS  (PRN):      ALLERGIES:  Allergies    No Known Allergies    Intolerances        LABS:                        9.9    7.02  )-----------( 240      ( 23 Nov 2020 05:46 )             30.7     11-23    136  |  109<H>  |  88<H>  ----------------------------<  134<H>  5.2   |  17<L>  |  2.48<H>    Ca    8.7      23 Nov 2020 05:46    TPro  6.8  /  Alb  3.6  /  TBili  0.2  /  DBili  x   /  AST  43<H>  /  ALT  55<H>  /  AlkPhos  62  11-22    PT/INR - ( 22 Nov 2020 21:50 )   PT: 11.5 sec;   INR: 0.96          PTT - ( 22 Nov 2020 21:50 )  PTT:35.5 sec    CAPILLARY BLOOD GLUCOSE      POCT Blood Glucose.: 148 mg/dL (23 Nov 2020 06:43)      RADIOLOGY & ADDITIONAL TESTS: Reviewed. NEPHROLOGY CONSULT NOTE    HPI: 70yoM with a PMH HTN, HLD, DM2, CAD s/p multiple PCI's in the past and CABG x2 in 2017, chronic renal insufficiency, previous COVID-19 infection who was sent to the [facility] ED with complaints of SOB. Patient reports over the past few days his breathing has become labored and now having SOB on exertion. He is also experiencing LE edema during that same time frame. On arrival had a /104. Labs significant for K 5.5, bicarb 17, BUN/Cr 89/2.8, BNP 4373. Was given 60 of lasix w/ 650cc UOP. Repeat chemistry without improvement of renal function. CXR was clear. Echo performed today without e/o heart failure. NST pending. No UA, urine lytes, or US renal available for review. Pt's baseline Cr known to be 1.7.    ROS  CONSTITUTIONAL: No weakness, fevers or chills  RESPIRATORY: No cough, wheezing, hemoptysis; + shortness of breath  CARDIOVASCULAR: No chest pain or palpitations + edema  GASTROINTESTINAL: No abdominal or epigastric pain. No nausea, vomiting, or hematemesis; No diarrhea or constipation. No melena or hematochezia.  GENITOURINARY: No dysuria, frequency or hematuria  NEUROLOGICAL: No numbness or weakness      PMH: as above  meds: losartan 50, lopressor 50 BID, actos, glipizide, ASA/plavix, simva/zetia  PSH: as above  allergies:NKDA  SH: denies tobacco, etoh, or drug use    VITAL SIGNS:  Vital Signs Last 24 Hrs  T(C): 36.3 (23 Nov 2020 06:15), Max: 36.7 (22 Nov 2020 23:58)  T(F): 97.3 (23 Nov 2020 06:15), Max: 98.1 (22 Nov 2020 23:58)  HR: 58 (23 Nov 2020 08:40) (58 - 84)  BP: 159/78 (23 Nov 2020 08:40) (159/78 - 232/104)  BP(mean): 109 (23 Nov 2020 08:40) (107 - 125)  RR: 18 (23 Nov 2020 08:40) (14 - 20)  SpO2: 95% (23 Nov 2020 08:40) (95% - 98%)    PHYSICAL EXAM:    General: WDWN  HEENT: NCAT  Cardiovascular: S1, S2 normal; RRR, no M/G/R  Respiratory: CTABL; no W/R/R  Gastrointestinal: soft, nontender, nondistended. bowel sounds present.  Skin: no ulcerations or visible rashes appreciated  Extremities:  2+ edema b/l LE  Neurological: AAOx3; CN II-XII grossly intact; no focal deficits    MEDICATIONS:  MEDICATIONS  (STANDING):  amLODIPine   Tablet 5 milliGRAM(s) Oral daily  aspirin  chewable 81 milliGRAM(s) Oral daily  atorvastatin 40 milliGRAM(s) Oral at bedtime  ceFAZolin   IVPB 1000 milliGRAM(s) IV Intermittent every 8 hours  dextrose 40% Gel 15 Gram(s) Oral once  dextrose 5%. 1000 milliLiter(s) (50 mL/Hr) IV Continuous <Continuous>  dextrose 5%. 1000 milliLiter(s) (100 mL/Hr) IV Continuous <Continuous>  dextrose 50% Injectable 25 Gram(s) IV Push once  dextrose 50% Injectable 12.5 Gram(s) IV Push once  dextrose 50% Injectable 25 Gram(s) IV Push once  furosemide   Injectable 40 milliGRAM(s) IV Push two times a day  glucagon  Injectable 1 milliGRAM(s) IntraMuscular once  heparin   Injectable 7500 Unit(s) SubCutaneous every 8 hours  insulin lispro (ADMELOG) corrective regimen sliding scale   SubCutaneous Before meals and at bedtime  isosorbide   mononitrate ER Tablet (IMDUR) 30 milliGRAM(s) Oral daily  metoprolol tartrate 25 milliGRAM(s) Oral two times a day    MEDICATIONS  (PRN):      ALLERGIES:  Allergies    No Known Allergies    Intolerances        LABS:                        9.9    7.02  )-----------( 240      ( 23 Nov 2020 05:46 )             30.7     11-23    136  |  109<H>  |  88<H>  ----------------------------<  134<H>  5.2   |  17<L>  |  2.48<H>    Ca    8.7      23 Nov 2020 05:46    TPro  6.8  /  Alb  3.6  /  TBili  0.2  /  DBili  x   /  AST  43<H>  /  ALT  55<H>  /  AlkPhos  62  11-22    PT/INR - ( 22 Nov 2020 21:50 )   PT: 11.5 sec;   INR: 0.96          PTT - ( 22 Nov 2020 21:50 )  PTT:35.5 sec    CAPILLARY BLOOD GLUCOSE      POCT Blood Glucose.: 148 mg/dL (23 Nov 2020 06:43)      RADIOLOGY & ADDITIONAL TESTS: Reviewed.

## 2020-11-23 NOTE — CONSULT NOTE ADULT - ATTENDING COMMENTS
ARF on CKD w/o obvious cause - in setting of fluid overload and hypertensive  agree with continue diuresis   w/u as above incl urine studies, sono  pioglitazone is probably not best med for him as causes fluid retention and hypertension-- consider change diabetic agent

## 2020-11-23 NOTE — PROGRESS NOTE ADULT - PROBLEM SELECTOR PLAN 2
Cont home BP medications  Titrate BP Meds as tolerated  Monitor on tele  Add Imdur 30 HX HTN. SBP 230s on admission  - SBP 160s-180s  - c/w metoprolol tartrate 25mg BID   - c/w Imdur 30mg  - Norvasc 5mg added to regimen   - continue to HOLD home Losartan 2/2 Renal insufficiency

## 2020-11-23 NOTE — PROGRESS NOTE ADULT - PROBLEM SELECTOR PLAN 8
F: No IVF  N: NPO pending NST today.   E: Replete lytes PRN K<4, Mg<2  P: DVT PPX: on HSQ  C: FULL CODE  Dispo: Admit to tele 5 Uris

## 2020-11-23 NOTE — PROGRESS NOTE ADULT - PROBLEM SELECTOR PLAN 7
Treated with 9 days of keflex. Will hold given renal failure. Re-eval in am ELMA DM.  - hgba1c 8.0  - c/w TOM

## 2020-11-23 NOTE — PROGRESS NOTE ADULT - PROBLEM SELECTOR PROBLEM 6
Type 2 diabetes mellitus without complication, with long-term current use of insulin Mixed hyperlipidemia

## 2020-11-23 NOTE — PROGRESS NOTE ADULT - PROBLEM SELECTOR PLAN 3
Monitor renal function closely  Baseline creat 1.7  Strct I+O  Avoid nephrotoxic agents  Hold Losartan HX Chronic Renal Insufficiency , baseline Cr 1.4  - Creat 2.48 today  - c/w IV diuresis  - continue to HOLD home Losartan 2/2 Renal insufficiency  - renally dose meds  - avoid nephrotoxic agents  - renal consulted, follow up recs. HX Chronic Renal Insufficiency , baseline Cr 1.4  - Creat 2.48 today  - c/w IV diuresis  - continue to HOLD home Losartan 2/2 Renal insufficiency  - renally dose meds  - avoid nephrotoxic agents  - renal consulted, appreciate recs.   - pending renal/abdominal ultrasound

## 2020-11-23 NOTE — PROGRESS NOTE ADULT - PROBLEM SELECTOR PLAN 4
S/P CABG  Cont ASA  Serial trops Completed 9 day Course of Keflex for LE Cellulitis   - BL LE erythema/cellulitis LLE Simental > RLE   - afebrile, no leukocytosis and non-toxic appearing  - IV Ancef 1g q8h added to regimen as d/w Dr. Sharpe

## 2020-11-23 NOTE — PROGRESS NOTE ADULT - PROBLEM SELECTOR PROBLEM 7
Cellulitis of left lower extremity Type 2 diabetes mellitus without complication, with long-term current use of insulin

## 2020-11-23 NOTE — CONSULT NOTE ADULT - ASSESSMENT
70yoM with a PMH HTN, HLD, DM2, CAD s/p multiple PCI's in the past and CABG x2 in 2017, chronic renal insufficiency, previous COVID-19 infection who was sent to the [facility] ED with complaints of SOB. Found to be in hypertensive urgency w/ EUGENIE     # Anemia  # EUGENIE on CKD  - EUGENIE in setting of hypertensive urgency  - baseline Cr 1.7  - on arrival K 5.5, bicarb 17, BUN/Cr 89/2.8, BNP 4373. Was given 60 of lasix w/ 650cc UOP  - Repeat chemistry without improvement of renal function.   - No UA, urine lytes, or US renal available for review.   - Hgb 9.9 MCV 94. No e/o acute bleed    Recommendations  - diuresis per primary team  - please send UA, urine Cr, Urea, protein/Cr ratio  - US renal w/ dopplers  - agree w/ holding ARB, avoid nephrotoxic drugs  - for anemia send iron panel/B12/folate  - nephrology will continue to follow    d/w attending and fellow   70yoM with a PMH HTN, HLD, DM2, CAD s/p multiple PCI's in the past and CABG x2 in 2017, chronic renal insufficiency, previous COVID-19 infection who was sent to the [facility] ED with complaints of SOB. Found to be in hypertensive urgency w/ EUGENIE     # Anemia  # EUGENIE on CKD  - EUGENIE in setting of hypertensive urgency. CKD likely from HTN/DM. A1c 8%  - baseline Cr 1.7  - on arrival K 5.5, bicarb 17, BUN/Cr 89/2.8, BNP 4373. Was given 60 of lasix w/ 650cc UOP  - Repeat chemistry without improvement of renal function.   - No UA, urine lytes, or US renal available for review.   - Hgb 9.9 MCV 94. No e/o acute bleed    Recommendations  - diuresis per primary team  - please send UA, urine Cr, Urea, protein/Cr ratio  - US renal w/ dopplers  - agree w/ holding ARB, avoid nephrotoxic drugs  - for anemia send iron panel/B12/folate  - nephrology will continue to follow    d/w attending and fellow   70yoM with a PMH HTN, HLD, DM2, CAD s/p multiple PCI's in the past and CABG x2 in 2017, chronic renal insufficiency, previous COVID-19 infection who was sent to the [facility] ED with complaints of SOB. Found to be in hypertensive urgency w/ EUGENIE     # Anemia  # EUGENIE on CKD  - EUGENIE in setting of hypertensive urgency. CKD likely from HTN/DM. A1c 8%  - baseline Cr 1.7  - on arrival K 5.5, bicarb 17, BUN/Cr 89/2.8, BNP 4373. Was given 60 of lasix w/ 650cc UOP  - Repeat chemistry without improvement of renal function.   - No UA, urine lytes, or US renal available for review.   - Hgb 9.9 MCV 94. No e/o acute bleed    Recommendations  - continue with norvasc, metoprolol, and imdur home meds for BP control   - continue diuresis w/40mg IV lasix BID   - commence sodium bicarb 650mg PO q12h   - Lokelma 10g PO PRN for K > 5.5   - please send UA, urine Cr, Urea, protein/Cr ratio  - US renal w/ dopplers   - agree w/ holding ARB, avoid nephrotoxic drugs   - for anemia send iron panel/B12/folate   - renal diet   - strict IOs     Thank you for the opportunity to participate in the care of your patient. The nephrology service remains available to assist with any questions or concerns. Please feel free to reach us by paging the on-call nephrology fellow for urgent issues or as below.     Arian Prather M.D.   PGY-4, Nephrology Fellow   C: [phone]   P: [phone]

## 2020-11-23 NOTE — PROGRESS NOTE ADULT - PROBLEM SELECTOR PLAN 1
Decompensated CHF  Start IV lasix 40mg BID  2/2 LV dysfunction? Vs HTN urgency  Check 2D echo  Serial trops to R/O ACS P/W increased COTA. BNP 4373. Trop neg x 2. EKG w/out acute ischemic changes.   - Last ECHO 2017 s/f AV sclerosis w/o stenosis, normal LVEF,mild AR/TR  - NST 2017 w/out evidence ischemia   - pending ECHO and NST today  - c/w IV Lasix 40mg BID  - c/w metoprolol tartrate 25mg BID   - continue to HOLD home Losartan 2/2 Renal insufficiency   - Strict I/Os, daily weights. core measures P/W increased COTA. BNP 4373. Trop neg x 2. EKG w/out acute ischemic changes.   - Last ECHO 2017 s/f AV sclerosis w/o stenosis, normal LVEF,mild AR/TR  - pending ECHO and NST today  - c/w IV Lasix 40mg BID  - c/w metoprolol tartrate 25mg BID   - continue to HOLD home Losartan 2/2 Renal insufficiency   - Strict I/Os, daily weights. core measures

## 2020-11-23 NOTE — PROGRESS NOTE ADULT - SUBJECTIVE AND OBJECTIVE BOX
CARDIOLOGY NP PROGRESS NOTE    Subjective: Received pt awake laying flat in bed in NAD. Patient states only dyspneic on exertion. Denies CP, dizziness/diaphoresis, n/v, palpitations.   Remainder ROS otherwise negative.    Overnight Events:  No acute events overnight     TELEMETRY: SR (HR 60s-80s)      VITAL SIGNS:  T(C): 36.3 (11-23-20 @ 06:15), Max: 36.7 (11-22-20 @ 23:58)  HR: 84 (11-23-20 @ 06:38) (64 - 84)  BP: 164/74 (11-23-20 @ 06:38) (162/82 - 232/104)  RR: 17 (11-23-20 @ 06:38) (14 - 20)  SpO2: 96% (11-23-20 @ 06:38) (96% - 98%)  Wt(kg): --    I&O's Summary    22 Nov 2020 07:01  -  23 Nov 2020 07:00  --------------------------------------------------------  IN: 180 mL / OUT: 650 mL / NET: -470 mL    23 Nov 2020 07:01  -  23 Nov 2020 09:22  --------------------------------------------------------  IN: 0 mL / OUT: 0 mL / NET: 0 mL          PHYSICAL EXAM:    General: A/ox 3, No acute Distress  Neck: Supple, NO JVD  Cardiac: S1 S2, No M/R/G  Pulmonary: CTAB, Breathing unlabored on RA, No Rhonchi/Rales/Wheezing  Abdomen: Soft, Non -tender, +BS x 4 quads  Extremities: No Rashes, +2 pitting edema BL LE; erythema/cellulitis LLE Shin > RLE   Neuro: A/o x 3, No focal deficits          LABS:                          9.9    7.02  )-----------( 240      ( 23 Nov 2020 05:46 )             30.7                              11-23    136  |  109<H>  |  88<H>  ----------------------------<  134<H>  5.2   |  17<L>  |  2.48<H>    Ca    8.7      23 Nov 2020 05:46    TPro  6.8  /  Alb  3.6  /  TBili  0.2  /  DBili  x   /  AST  43<H>  /  ALT  55<H>  /  AlkPhos  62  11-22    LIVER FUNCTIONS - ( 22 Nov 2020 21:50 )  Alb: 3.6 g/dL / Pro: 6.8 g/dL / ALK PHOS: 62 U/L / ALT: 55 U/L / AST: 43 U/L / GGT: x                                 PT/INR - ( 22 Nov 2020 21:50 )   PT: 11.5 sec;   INR: 0.96          PTT - ( 22 Nov 2020 21:50 )  PTT:35.5 sec  CAPILLARY BLOOD GLUCOSE      POCT Blood Glucose.: 148 mg/dL (23 Nov 2020 06:43)    CARDIAC MARKERS ( 23 Nov 2020 05:46 )  x     / <0.01 ng/mL / x     / x     / x      CARDIAC MARKERS ( 22 Nov 2020 21:50 )  x     / <0.01 ng/mL / x     / x     / x              Allergies:  No Known Allergies    MEDICATIONS  (STANDING):  amLODIPine   Tablet 5 milliGRAM(s) Oral daily  aspirin  chewable 81 milliGRAM(s) Oral daily  atorvastatin 40 milliGRAM(s) Oral at bedtime  ceFAZolin   IVPB 1000 milliGRAM(s) IV Intermittent every 8 hours  dextrose 40% Gel 15 Gram(s) Oral once  dextrose 5%. 1000 milliLiter(s) (50 mL/Hr) IV Continuous <Continuous>  dextrose 5%. 1000 milliLiter(s) (100 mL/Hr) IV Continuous <Continuous>  dextrose 50% Injectable 25 Gram(s) IV Push once  dextrose 50% Injectable 12.5 Gram(s) IV Push once  dextrose 50% Injectable 25 Gram(s) IV Push once  furosemide   Injectable 40 milliGRAM(s) IV Push two times a day  glucagon  Injectable 1 milliGRAM(s) IntraMuscular once  insulin lispro (ADMELOG) corrective regimen sliding scale   SubCutaneous Before meals and at bedtime  isosorbide   mononitrate ER Tablet (IMDUR) 30 milliGRAM(s) Oral daily  metoprolol tartrate 25 milliGRAM(s) Oral two times a day    MEDICATIONS  (PRN):        DIAGNOSTIC TESTS:

## 2020-11-23 NOTE — PROGRESS NOTE ADULT - PROBLEM SELECTOR PLAN 5
Lipitor 40--theraputic interchange for zocor 80  Resume Zetia on dc HX CAD w/PCIs and CABG in the past   - CP free at this time  - trop neg x 2  - NST 2017 w/out evidence ischemia   - pending ECHO and NST today  - c/w ASA 81mg and Atorvastatin 40mg HX CAD w/PCIs and CABG in the past   - CP free at this time  - trop neg x 2  - pending ECHO and NST today  - c/w ASA 81mg and Atorvastatin 40mg

## 2020-11-23 NOTE — PROGRESS NOTE ADULT - PROBLEM SELECTOR PLAN 6
insulin coverage  Hold PO meds HX HLD.   - Chol 108  HDL 41 LDL 45  -  c/w Atorvastatin 40mg (therapeutic interchange for Zocor 80)  - Resume Zetia on dc HX HLD.   - Chol 108  HDL 41 LDL 45  - c/w Atorvastatin 40mg (therapeutic interchange for Zocor 80)  - Resume Zetia on dc

## 2020-11-23 NOTE — PROGRESS NOTE ADULT - ASSESSMENT
70 y/oM PMHx HTN, HLD, DM1, CAD s/p multiple PCIs and CABG x 2 in 2017, chronic renal insufficiency (baseline Cr 1.4), previous COVID infection presented to ED 11/22 w. increased COTA and LE edema     70 y/oM PMHx HTN, HLD, DM1, CAD s/p multiple PCIs and CABG x 2 in 2017, chronic renal insufficiency (baseline Cr 1.4), previous COVID infection presented to ED 11/22 w. increased COTA and LE edema, admitted for acute HF exacerbation, on IV diuresis pending NST today.

## 2020-11-24 ENCOUNTER — TRANSCRIPTION ENCOUNTER (OUTPATIENT)
Age: 70
End: 2020-11-24

## 2020-11-24 VITALS — TEMPERATURE: 98 F

## 2020-11-24 LAB
ANION GAP SERPL CALC-SCNC: 12 MMOL/L — SIGNIFICANT CHANGE UP (ref 5–17)
BUN SERPL-MCNC: 81 MG/DL — HIGH (ref 7–23)
CALCIUM SERPL-MCNC: 8.8 MG/DL — SIGNIFICANT CHANGE UP (ref 8.4–10.5)
CHLORIDE SERPL-SCNC: 110 MMOL/L — HIGH (ref 96–108)
CO2 SERPL-SCNC: 17 MMOL/L — LOW (ref 22–31)
CREAT SERPL-MCNC: 2.55 MG/DL — HIGH (ref 0.5–1.3)
FERRITIN SERPL-MCNC: 277 NG/ML — SIGNIFICANT CHANGE UP (ref 30–400)
FOLATE SERPL-MCNC: >20 NG/ML — SIGNIFICANT CHANGE UP
GLUCOSE BLDC GLUCOMTR-MCNC: 135 MG/DL — HIGH (ref 70–99)
GLUCOSE BLDC GLUCOMTR-MCNC: 184 MG/DL — HIGH (ref 70–99)
GLUCOSE SERPL-MCNC: 142 MG/DL — HIGH (ref 70–99)
HCT VFR BLD CALC: 30.9 % — LOW (ref 39–50)
HGB BLD-MCNC: 9.8 G/DL — LOW (ref 13–17)
IRON SATN MFR SERPL: 102 UG/DL — SIGNIFICANT CHANGE UP (ref 45–165)
IRON SATN MFR SERPL: 42 % — SIGNIFICANT CHANGE UP (ref 16–55)
MAGNESIUM SERPL-MCNC: 3.3 MG/DL — HIGH (ref 1.6–2.6)
MCHC RBC-ENTMCNC: 30.5 PG — SIGNIFICANT CHANGE UP (ref 27–34)
MCHC RBC-ENTMCNC: 31.7 GM/DL — LOW (ref 32–36)
MCV RBC AUTO: 96.3 FL — SIGNIFICANT CHANGE UP (ref 80–100)
NRBC # BLD: 0 /100 WBCS — SIGNIFICANT CHANGE UP (ref 0–0)
PLATELET # BLD AUTO: 236 K/UL — SIGNIFICANT CHANGE UP (ref 150–400)
POTASSIUM SERPL-MCNC: 4.8 MMOL/L — SIGNIFICANT CHANGE UP (ref 3.5–5.3)
POTASSIUM SERPL-SCNC: 4.8 MMOL/L — SIGNIFICANT CHANGE UP (ref 3.5–5.3)
RBC # BLD: 3.21 M/UL — LOW (ref 4.2–5.8)
RBC # FLD: 14.7 % — HIGH (ref 10.3–14.5)
SODIUM SERPL-SCNC: 139 MMOL/L — SIGNIFICANT CHANGE UP (ref 135–145)
TIBC SERPL-MCNC: 243 UG/DL — SIGNIFICANT CHANGE UP (ref 220–430)
TRANSFERRIN SERPL-MCNC: 204 MG/DL — SIGNIFICANT CHANGE UP (ref 200–360)
UIBC SERPL-MCNC: 141 UG/DL — SIGNIFICANT CHANGE UP (ref 110–370)
VIT B12 SERPL-MCNC: 1765 PG/ML — HIGH (ref 232–1245)
WBC # BLD: 6.78 K/UL — SIGNIFICANT CHANGE UP (ref 3.8–10.5)
WBC # FLD AUTO: 6.78 K/UL — SIGNIFICANT CHANGE UP (ref 3.8–10.5)

## 2020-11-24 PROCEDURE — 82607 VITAMIN B-12: CPT

## 2020-11-24 PROCEDURE — 83880 ASSAY OF NATRIURETIC PEPTIDE: CPT

## 2020-11-24 PROCEDURE — 84484 ASSAY OF TROPONIN QUANT: CPT

## 2020-11-24 PROCEDURE — 85610 PROTHROMBIN TIME: CPT

## 2020-11-24 PROCEDURE — 86803 HEPATITIS C AB TEST: CPT

## 2020-11-24 PROCEDURE — 85730 THROMBOPLASTIN TIME PARTIAL: CPT

## 2020-11-24 PROCEDURE — U0003: CPT

## 2020-11-24 PROCEDURE — 99285 EMERGENCY DEPT VISIT HI MDM: CPT | Mod: 25

## 2020-11-24 PROCEDURE — 93306 TTE W/DOPPLER COMPLETE: CPT

## 2020-11-24 PROCEDURE — 93017 CV STRESS TEST TRACING ONLY: CPT

## 2020-11-24 PROCEDURE — 80053 COMPREHEN METABOLIC PANEL: CPT

## 2020-11-24 PROCEDURE — 78452 HT MUSCLE IMAGE SPECT MULT: CPT

## 2020-11-24 PROCEDURE — 84443 ASSAY THYROID STIM HORMONE: CPT

## 2020-11-24 PROCEDURE — 82746 ASSAY OF FOLIC ACID SERUM: CPT

## 2020-11-24 PROCEDURE — 76700 US EXAM ABDOM COMPLETE: CPT

## 2020-11-24 PROCEDURE — 83540 ASSAY OF IRON: CPT

## 2020-11-24 PROCEDURE — 36415 COLL VENOUS BLD VENIPUNCTURE: CPT

## 2020-11-24 PROCEDURE — 83735 ASSAY OF MAGNESIUM: CPT

## 2020-11-24 PROCEDURE — 71045 X-RAY EXAM CHEST 1 VIEW: CPT

## 2020-11-24 PROCEDURE — 85027 COMPLETE CBC AUTOMATED: CPT

## 2020-11-24 PROCEDURE — 99232 SBSQ HOSP IP/OBS MODERATE 35: CPT

## 2020-11-24 PROCEDURE — 96374 THER/PROPH/DIAG INJ IV PUSH: CPT

## 2020-11-24 PROCEDURE — A9500: CPT

## 2020-11-24 PROCEDURE — 84466 ASSAY OF TRANSFERRIN: CPT

## 2020-11-24 PROCEDURE — 83550 IRON BINDING TEST: CPT

## 2020-11-24 PROCEDURE — 76700 US EXAM ABDOM COMPLETE: CPT | Mod: 26,59

## 2020-11-24 PROCEDURE — 80061 LIPID PANEL: CPT

## 2020-11-24 PROCEDURE — 82728 ASSAY OF FERRITIN: CPT

## 2020-11-24 PROCEDURE — 93976 VASCULAR STUDY: CPT

## 2020-11-24 PROCEDURE — 85025 COMPLETE CBC W/AUTO DIFF WBC: CPT

## 2020-11-24 PROCEDURE — 82962 GLUCOSE BLOOD TEST: CPT

## 2020-11-24 PROCEDURE — 83036 HEMOGLOBIN GLYCOSYLATED A1C: CPT

## 2020-11-24 PROCEDURE — 93976 VASCULAR STUDY: CPT | Mod: 26

## 2020-11-24 PROCEDURE — 80048 BASIC METABOLIC PNL TOTAL CA: CPT

## 2020-11-24 RX ORDER — ISOSORBIDE MONONITRATE 60 MG/1
1 TABLET, EXTENDED RELEASE ORAL
Qty: 30 | Refills: 0
Start: 2020-11-24 | End: 2020-12-23

## 2020-11-24 RX ORDER — AMLODIPINE BESYLATE 2.5 MG/1
1 TABLET ORAL
Qty: 30 | Refills: 0
Start: 2020-11-24 | End: 2020-12-23

## 2020-11-24 RX ORDER — METOPROLOL TARTRATE 50 MG
1 TABLET ORAL
Qty: 60 | Refills: 0
Start: 2020-11-24 | End: 2020-12-23

## 2020-11-24 RX ORDER — FUROSEMIDE 40 MG
1 TABLET ORAL
Qty: 60 | Refills: 0
Start: 2020-11-24 | End: 2020-12-23

## 2020-11-24 RX ORDER — CEPHALEXIN 500 MG
1 CAPSULE ORAL
Qty: 12 | Refills: 0
Start: 2020-11-24 | End: 2020-11-26

## 2020-11-24 RX ADMIN — Medication 1: at 12:28

## 2020-11-24 RX ADMIN — Medication 100 MILLIGRAM(S): at 06:52

## 2020-11-24 RX ADMIN — Medication 81 MILLIGRAM(S): at 12:28

## 2020-11-24 RX ADMIN — AMLODIPINE BESYLATE 5 MILLIGRAM(S): 2.5 TABLET ORAL at 06:51

## 2020-11-24 RX ADMIN — ISOSORBIDE MONONITRATE 60 MILLIGRAM(S): 60 TABLET, EXTENDED RELEASE ORAL at 12:28

## 2020-11-24 RX ADMIN — Medication 25 MILLIGRAM(S): at 06:51

## 2020-11-24 RX ADMIN — HEPARIN SODIUM 7500 UNIT(S): 5000 INJECTION INTRAVENOUS; SUBCUTANEOUS at 15:02

## 2020-11-24 RX ADMIN — HEPARIN SODIUM 7500 UNIT(S): 5000 INJECTION INTRAVENOUS; SUBCUTANEOUS at 06:51

## 2020-11-24 RX ADMIN — Medication 40 MILLIGRAM(S): at 06:51

## 2020-11-24 NOTE — DIETITIAN INITIAL EVALUATION ADULT. - PERTINENT MEDS FT
Admelog: Corrective regimen  Lipitor  Oxycodone  Serum Vit B12: 1765 Admelog: Corrective regimen  Lipitor  Oxycodone  Zefazolin  Metoprolol Tartrate Reviewed 11/24

## 2020-11-24 NOTE — DISCHARGE NOTE PROVIDER - CARE PROVIDER_API CALL
Kristofer Sharpe S  CARDIOVASCULAR DISEASE  [address], [address]  [place] [address]  Phone: [phone]  Fax: [phone]  Established Patient  Follow Up Time: 1 week    Rafy Fry)  Internal Medicine; Nephrology  [address] [address]  Phone: [phone]  Fax: [phone]  Follow Up Time: 2 weeks   Kristofer Sharpe S  CARDIOVASCULAR DISEASE  [address], [address]  [place] [address]  Phone: [phone]  Fax: [phone]  Established Patient  Scheduled Appointment: 11/30/2020 11:45 AM    Rafy Fry)  Internal Medicine; Nephrology  [address] [address]  Phone: [phone]  Fax: [phone]  Follow Up Time: 2 weeks

## 2020-11-24 NOTE — DIETITIAN INITIAL EVALUATION ADULT. - PERSON TAUGHT/METHOD
Encouraged HBV protein + low-carb food choices./verbal instruction/patient instructed patient instructed/verbal instruction/Encouraged protein + low-carb food choices. Pt declined further diet education for DM/low sodium during visit.

## 2020-11-24 NOTE — DIETITIAN INITIAL EVALUATION ADULT. - PROBLEM SELECTOR PLAN 3
Monitor renal function closely  Baseline creat 1.7  Strct I+O  Avoid nephrotoxic agents  Hold Losartan

## 2020-11-24 NOTE — PROGRESS NOTE ADULT - ATTENDING COMMENTS
cont lasix diuresis   renal fxn unchanged may be able to f/u as outpt on 40 BID PO -   send urine studies

## 2020-11-24 NOTE — DISCHARGE NOTE PROVIDER - PROVIDER TOKENS
PROVIDER:[TOKEN:[[id number]],FOLLOWUP:[1 week],ESTABLISHEDPATIENT:[T]],PROVIDER:[TOKEN:[[id number]],FOLLOWUP:[2 weeks]] PROVIDER:[TOKEN:[[id number]],SCHEDULEDAPPT:[11/30/2020],SCHEDULEDAPPTTIME:[11:45 AM],ESTABLISHEDPATIENT:[T]],PROVIDER:[TOKEN:[[id number]],FOLLOWUP:[2 weeks]]

## 2020-11-24 NOTE — DISCHARGE NOTE NURSING/CASE MANAGEMENT/SOCIAL WORK - PATIENT PORTAL LINK FT
You can access the FollowMyHealth Patient Portal offered by [facility] by registering at the following website: [email or web address]. By joining [employer]’s FollowMyHealth portal, you will also be able to view your health information using other applications (apps) compatible with our system.

## 2020-11-24 NOTE — DISCHARGE NOTE PROVIDER - NSDCCPCAREPLAN_GEN_ALL_CORE_FT
PRINCIPAL DISCHARGE DIAGNOSIS  Diagnosis: Acute diastolic heart failure  Assessment and Plan of Treatment: You were admitted to the cardiac telemetry floor with congestive heart failure.  You were given diuretic medication intravenous Lasix to get rid of the fluid in your lungs and body to help you breathe better. Please take Lasix 40mg twice a day as prescribed without missing doses. Please maintain a low salt diet and fluid restriction of 1.5 liters per day to prevent from fluid being reaccumulated. Weigh yourself daily and report any weight gain over 2 pounds/day or per week to your cardiologist.      SECONDARY DISCHARGE DIAGNOSES  Diagnosis: Hypertensive urgency  Assessment and Plan of Treatment: You were found to have high blood pressure on arrival to the hospital. Due to your abnormal kidney levels, your previous blood pressure medication Losartan have been STOPPED. Start taking Amlodipine 5mg once a day, Imdur 60mg once a day, and Metoprolol 25mg twice a day.    Diagnosis: EUGENIE (acute kidney injury)  Assessment and Plan of Treatment: You were found to have high kidney levels while in the hospital compared to your previous levels. Due to this you were seen by the Nephrologist. STOP taking Losartan as it can cause further worsenign of the kidney level. Please follow up with the Nephrologist Dr Fry in 1-2 weeks.    Diagnosis: Cellulitis of left lower extremity  Assessment and Plan of Treatment: Please complete a total 14 days of antibiotics for the left leg cellulitis, your last dose of the antibiotic Keflex will be on Thursday 11/26/20.

## 2020-11-24 NOTE — DIETITIAN INITIAL EVALUATION ADULT. - ADD RECOMMEND
1) Recommend change diet to DASH/TLC + Cst CHO (no snacks) Kosher 2) Monitor diet for % PO intake/ GI distress, honor food preferences 3) Pain/ bowel regimen and fluids per team 4) Monitor lytes and replete prn 5) RD to remain available prn.

## 2020-11-24 NOTE — PROGRESS NOTE ADULT - ASSESSMENT
70yoM with a PMH HTN, HLD, DM2, CAD s/p multiple PCI's in the past and CABG x2 in 2017, chronic renal insufficiency, previous COVID-19 infection who was sent to the [facility] ED with complaints of SOB. Found to be in hypertensive urgency w/ EUGENIE     # Anemia  # EUGENIE on CKD  - EUGENIE in setting of hypertensive urgency   - CKD likely from HTN/DM. A1c 8%  - baseline Cr 1.7  - Repeat chemistry without improvement of renal function.   - No UA, urine lytes available for review.   - renal echo w/dopplers unremarkable     Recommendations  - continue with norvasc, metoprolol, and imdur home meds for BP control   - continue diuresis w/40mg IV lasix BID   - commence sodium bicarb 650mg PO q12h   - Lokelma 10g PO PRN for K > 5.5   - please send UA, urine Cr, Urea, protein/Cr ratio  - US renal w/ dopplers appreciated, non-contributory   - agree w/ holding ARB, avoid nephrotoxic drugs   - renal diet   - strict IOs     Thank you for the opportunity to participate in the care of your patient. The nephrology service remains available to assist with any questions or concerns. Please feel free to reach us by paging the on-call nephrology fellow for urgent issues or as below.     Arian Prather M.D.   PGY-4, Nephrology Fellow   C: [phone]   P: [phone] 70yoM with a PMH HTN, HLD, DM2, CAD s/p multiple PCI's in the past and CABG x2 in 2017, chronic renal insufficiency, previous COVID-19 infection who was sent to the [facility] ED with complaints of SOB. Found to be in hypertensive urgency w/ EUGENIE     # Anemia  # EUGENIE on CKD  - EUGENIE in setting of hypertensive urgency   - CKD likely from HTN/DM. A1c 8%  - baseline Cr 1.7  - Repeat chemistry without improvement of renal function.   - No UA, urine lytes available for review.   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 The nephrology service remains available to assist with any questions or concerns. Please feel free to reach us by paging the on-call nephrology fellow for urgent issues or as below.     Arian Prather M.D.   PGY-4, Nephrology Fellow   C: [phone]   P: [phone]

## 2020-11-24 NOTE — DIETITIAN INITIAL EVALUATION ADULT. - OTHER CALCULATIONS
IBW used for calculations as pt >120% of IBW (166%). Nutrient needs based on [facility] standards of care for maintenance in adults., adjusted for acute CHF, EUGENIE on CKD. Fluids per team. IBW used for calculations as pt >120% of IBW (166%). Nutrient needs based on [facility] standards of care for maintenance in adults, adjusted for acute CHF, EUGENIE on CKD. Fluids per team.

## 2020-11-24 NOTE — PROGRESS NOTE ADULT - SUBJECTIVE AND OBJECTIVE BOX
INTERVAL HISTORY:  	no sob,cp      MEDICATIONS:  amLODIPine   Tablet 5 milliGRAM(s) Oral daily  furosemide   Injectable 40 milliGRAM(s) IV Push two times a day  isosorbide   mononitrate ER Tablet (IMDUR) 60 milliGRAM(s) Oral daily  metoprolol tartrate 25 milliGRAM(s) Oral two times a day    ceFAZolin   IVPB 1000 milliGRAM(s) IV Intermittent every 8 hours          atorvastatin 40 milliGRAM(s) Oral at bedtime  dextrose 40% Gel 15 Gram(s) Oral once  dextrose 50% Injectable 25 Gram(s) IV Push once  dextrose 50% Injectable 12.5 Gram(s) IV Push once  dextrose 50% Injectable 25 Gram(s) IV Push once  glucagon  Injectable 1 milliGRAM(s) IntraMuscular once  insulin lispro (ADMELOG) corrective regimen sliding scale   SubCutaneous Before meals and at bedtime    aspirin  chewable 81 milliGRAM(s) Oral daily  dextrose 5%. 1000 milliLiter(s) IV Continuous <Continuous>  dextrose 5%. 1000 milliLiter(s) IV Continuous <Continuous>  heparin   Injectable 7500 Unit(s) SubCutaneous every 8 hours          PHYSICAL EXAM:  T(C): 36.8 (11-24-20 @ 06:14), Max: 36.8 (11-24-20 @ 06:14)  HR: 74 (11-24-20 @ 05:58) (58 - 90)  BP: 130/59 (11-24-20 @ 05:58) (130/59 - 175/76)  RR: 18 (11-24-20 @ 05:58) (18 - 18)  SpO2: 95% (11-24-20 @ 05:58) (94% - 95%)  Wt(kg): --  I&O's Summary    23 Nov 2020 07:01  -  24 Nov 2020 07:00  --------------------------------------------------------  IN: 298 mL / OUT: 300 mL / NET: -2 mL          Appearance: Normal	  HEENT:   Normal oral mucosa, PERRL, EOMI	  Lymphatic: No lymphadenopathy  Cardiovascular: Normal S1 S2, No JVD, No murmurs, No edema  Respiratory: Lungs clear to auscultation	  Psychiatry: A & O x 3, Mood & affect appropriate  Gastrointestinal:  Soft, Non-tender, + BS	  Skin: Mild resolving cellulitis on anterior LLE No ecchymoses, No cyanosis  Neurologic: Non-focal  Extremities: Normal range of motion, No clubbing, cyanosis or edema  Vascular: Peripheral pulses palpable 2+ bilaterally    TELEMETRY: 	    ECG:  	  RADIOLOGY:   DIAGNOSTIC TESTING:  [ ] Echocardiogram:  [ ]  Catheterization:  [ ] Stress Test:    OTHER: 	    LABS:	 	    CARDIAC MARKERS:                                  9.9    7.02  )-----------( 240      ( 23 Nov 2020 05:46 )             30.7     11-23    136  |  109<H>  |  88<H>  ----------------------------<  134<H>  5.2   |  17<L>  |  2.48<H>    Ca    8.7      23 Nov 2020 05:46    TPro  6.8  /  Alb  3.6  /  TBili  0.2  /  DBili  x   /  AST  43<H>  /  ALT  55<H>  /  AlkPhos  62  11-22    proBNP:   Lipid Profile:   HgA1c:   TSH:     ASSESSMENT/PLAN: 	  reviewed echo- normal LVEF; nuc mild abnormality which was read as possible artefactual- will treat medicallly- cont imdur for cad- feel was volume overloaded due to ARF- as per Dr Fry- cont lasix- DM f/u with PCP- cont ABX for cellulitis  statin, antihypertensives  f/u in 1 week

## 2020-11-24 NOTE — DIETITIAN INITIAL EVALUATION ADULT. - NUTRITIONGOAL OUTCOME1
Pt to name 2 teach back points. Pt to name 2 teach back points / Pt to understand value for following proper diet

## 2020-11-24 NOTE — DIETITIAN INITIAL EVALUATION ADULT. - OTHER INFO
70M with a PMH HTN, HLD, DM2(A1C: 8.0), CAD s/p multiple PCI's in the past and CABG x2 in 2017, chronic renal insufficiency, previous COVID-19 infection who was sent to the [facility] ED with complaints of SOB. Found to be in hypertensive urgency w/ EUGENIE. Pt is being diuresed w/40mg IV lasix BID. Per cardiology, echo performed 11/23 without e/o heart failure, normal LVEF; nuc mild abnormality which was read as possible artefactual- will be treated medically. Pt is now s/p NST. Skin noted with 1+ edema to R ankle/foot, 2+ L Leg/ankle, Gallo Score: 20. Pt denies N/V/C/D,NKFA +BM noted 11/22. NKFA.    Visited pt in room, resting comfortably in bed. Pt was NPO at time of assessment, since advanced to DASH/TLC diet. Pt reports tolerating diet well, with good appetite eating ~75% dinner last night with no difficulty chewing/swallowing. PTA, pt reports eating a regular diet with no restrictions including foods such as eggs, yogurt, cheese, rice and coffee. Pt reports a UBW: 240lbs with no recent wt loss, EMR wt range: 251.7-256.3 lbs noted. Discussed low-carb food choices and including HBV lean protein. Pt receptive. Offered handouts and pt declined. Please see below for recs. RD to f/u per protocol. 70M with a PMH HTN, HLD, DM2(A1C: 8.0), CAD s/p multiple PCI's in the past and CABG x2 in 2017, chronic renal insufficiency, previous COVID-19 infection who was sent to the [facility] ED with complaints of SOB. Found to be in hypertensive urgency with EUGENIE (possible d/t fluid over load vs HTN). Pt is being diuresed with 40mg IV lasix BID. Per cardiology, echo performed 11/23 without e/o heart failure, normal LVEF; nuc mild abnormality which was read as possible artefactual- will be treated medically. Pt is now s/p NST. Pt is Pending US Renal to r/o stenosis - possible D/C vs transfer to Medicine. Skin noted with 1+ edema to R ankle/foot, 2+ L Leg/ankle, Gallo Score: 20. No pressure ulcers; +R Thigh abscess. Pt denies N/V/C/D,NKFA +BM noted 11/22. NKFA.  Visited pt in room, resting comfortably in bed. Pt was NPO at time of assessment, since advanced to DASH/TLC diet- Pending Tang. Pt reports tolerating dinner well 11/23, with good appetite eating ~75% with no difficulty chewing/swallowing. PTA, pt reports eating a regular diet with no restrictions including foods such as eggs, yogurt, cheese, rice and coffee. Pt reports knowing what to do however not doing so. Pt reports a UBW: 240lbs with no recent wt loss, EMR wt range: 251.7-256.3 lbs noted - assume d/t fluid shifts. Discussed low-carb food choices and including lean protein. Pt receptive. Offered handouts and pt declined.  Please see below for recs. RD to f/u per protocol.

## 2020-11-24 NOTE — DISCHARGE NOTE PROVIDER - INSTRUCTIONS
- Limit salt intake on a daily basis. Salt is hidden in many foods including fast food, processed foods, deli foods and deli meats. Cook at home as much as you can and DO NOT use salt. Limit liquid intake to 1.5 liters a day (includes water, any drinks and soup).     - Have at least 2 cups of vegetables a day, at least 2 whole grains a day, 2 pieces of fruit a day, limiting red meat and processed meat to no more than twice per week, increasing fish intake to at least twice a week, having nuts and seeds on most days.

## 2020-11-24 NOTE — PROGRESS NOTE ADULT - SUBJECTIVE AND OBJECTIVE BOX
Patient is a 70y Male seen and evaluated at bedside.   BP elevated but acceptable   no complaints  UOP reportedly adequate   denies CP SOB   remains on RA     Meds:    amLODIPine   Tablet 5 daily  aspirin  chewable 81 daily  atorvastatin 40 at bedtime  ceFAZolin   IVPB 1000 every 8 hours  dextrose 40% Gel 15 once  dextrose 5%. 1000 <Continuous>  dextrose 5%. 1000 <Continuous>  dextrose 50% Injectable 25 once  dextrose 50% Injectable 12.5 once  dextrose 50% Injectable 25 once  furosemide   Injectable 40 two times a day  glucagon  Injectable 1 once  heparin   Injectable 7500 every 8 hours  insulin lispro (ADMELOG) corrective regimen sliding scale  Before meals and at bedtime  isosorbide   mononitrate ER Tablet (IMDUR) 60 daily  metoprolol tartrate 25 two times a day      T(C): , Max: 36.8 (11-24-20 @ 06:14)  T(F): , Max: 98.3 (11-24-20 @ 06:14)  HR: 66 (11-24-20 @ 08:18)  BP: 164/72 (11-24-20 @ 08:18)  BP(mean): 85 (11-24-20 @ 05:58)  RR: 18 (11-24-20 @ 08:18)  SpO2: 95% (11-24-20 @ 08:18)  Wt(kg): --    11-23 @ 07:01  -  11-24 @ 07:00  --------------------------------------------------------  IN: 298 mL / OUT: 300 mL / NET: -2 mL    11-24 @ 07:01  -  11-24 @ 11:49  --------------------------------------------------------  IN: 0 mL / OUT: 500 mL / NET: -500 mL        ROS:   CONSTITUTIONAL: No weakness, fevers or chills  RESPIRATORY: No cough   CARDIOVASCULAR: No chest pain or palpitations, +edema      PHYSICAL EXAM:  General: WDWN NAD on RA   Cardiovascular: S1, S2 normal; RRR, no M/G/R  Respiratory: CTABL; no W/R/R  Gastrointestinal: soft, nontender, nondistended.  Extremities:  2+ edema b/l LE        LABS:                        9.8    6.78  )-----------( 236      ( 24 Nov 2020 07:36 )             30.9     11-24    139  |  110<H>  |  81<H>  ----------------------------<  142<H>  4.8   |  17<L>  |  2.55<H>    Ca    8.8      24 Nov 2020 07:36  Mg     3.3     11-24    TPro  6.8  /  Alb  3.6  /  TBili  0.2  /  DBili  x   /  AST  43<H>  /  ALT  55<H>  /  AlkPhos  62  11-22      PT/INR - ( 22 Nov 2020 21:50 )   PT: 11.5 sec;   INR: 0.96          PTT - ( 22 Nov 2020 21:50 )  PTT:35.5 sec          RADIOLOGY & ADDITIONAL STUDIES:           Patient is a 70y Male seen and evaluated at bedside.   BP elevated but acceptable   no complaints  UOP reportedly adequate , swelling improved   denies CP SOB   remains on RA     Meds:    amLODIPine   Tablet 5 daily  aspirin  chewable 81 daily  atorvastatin 40 at bedtime  ceFAZolin   IVPB 1000 every 8 hours  dextrose 40% Gel 15 once  dextrose 5%. 1000 <Continuous>  dextrose 5%. 1000 <Continuous>  dextrose 50% Injectable 25 once  dextrose 50% Injectable 12.5 once  dextrose 50% Injectable 25 once  furosemide   Injectable 40 two times a day  glucagon  Injectable 1 once  heparin   Injectable 7500 every 8 hours  insulin lispro (ADMELOG) corrective regimen sliding scale  Before meals and at bedtime  isosorbide   mononitrate ER Tablet (IMDUR) 60 daily  metoprolol tartrate 25 two times a day      T(C): , Max: 36.8 (11-24-20 @ 06:14)  T(F): , Max: 98.3 (11-24-20 @ 06:14)  HR: 66 (11-24-20 @ 08:18)  BP: 164/72 (11-24-20 @ 08:18)  BP(mean): 85 (11-24-20 @ 05:58)  RR: 18 (11-24-20 @ 08:18)  SpO2: 95% (11-24-20 @ 08:18)  Wt(kg): --    11-23 @ 07:01  -  11-24 @ 07:00  --------------------------------------------------------  IN: 298 mL / OUT: 300 mL / NET: -2 mL    11-24 @ 07:01  -  11-24 @ 11:49  --------------------------------------------------------  IN: 0 mL / OUT: 500 mL / NET: -500 mL        ROS:   CONSTITUTIONAL: No weakness, fevers or chills  RESPIRATORY: No cough   CARDIOVASCULAR: No chest pain or palpitations, +edema      PHYSICAL EXAM:  General: WDWN NAD on RA   Cardiovascular: S1, S2 normal; RRR, no M/G/R  Respiratory: CTABL; no W/R/R  Gastrointestinal: soft, nontender, nondistended.  Extremities:  2+ edema b/l LE        LABS:                        9.8    6.78  )-----------( 236      ( 24 Nov 2020 07:36 )             30.9     11-24    139  |  110<H>  |  81<H>  ----------------------------<  142<H>  4.8   |  17<L>  |  2.55<H>    Ca    8.8      24 Nov 2020 07:36  Mg     3.3     11-24    TPro  6.8  /  Alb  3.6  /  TBili  0.2  /  DBili  x   /  AST  43<H>  /  ALT  55<H>  /  AlkPhos  62  11-22      PT/INR - ( 22 Nov 2020 21:50 )   PT: 11.5 sec;   INR: 0.96          PTT - ( 22 Nov 2020 21:50 )  PTT:35.5 sec          RADIOLOGY & ADDITIONAL STUDIES:

## 2020-11-24 NOTE — DIETITIAN INITIAL EVALUATION ADULT. - PERTINENT LABORATORY DATA
Chloride: 110  Bun: 81  Cr:2.55  Glucose: 142  Magnesium:3.3  POCT: 185 135 153 Chloride: 110  Bun: 81  Cr:2.55  Glucose: 142  Magnesium:3.3  POCT: 185 135 153  Serum Vit B12: 1765 Chloride: 110  Bun: 81  Cr:2.55  Glucose: 142  Magnesium:3.3  POCT: 185 135 153  Serum Vit B12: 1765  AST SGOT 43  ALT SGPT 55

## 2020-11-24 NOTE — DISCHARGE NOTE PROVIDER - NSDCMRMEDTOKEN_GEN_ALL_CORE_FT
acetaminophen 325 mg oral tablet: 2 tab(s) orally every 6 hours, As needed, Moderate Pain (4 - 6)  Actos 30 mg oral tablet: 1 tab(s) orally once a day  aspirin 81 mg oral tablet: 1 tab(s) orally once a day  clopidogrel 75 mg oral tablet: 1 tab(s) orally once a day  glipiZIDE 5 mg oral tablet: 1 tab(s) orally 3 times a day  Metoprolol Tartrate 50 mg oral tablet: 1 tab(s) orally 2 times a day  Percocet 2.5/325 325 mg-2.5 mg oral tablet: 1 tab(s) orally every 6 hours, As Needed severe pain MDD:2 tablets  polyethylene glycol 3350 oral powder for reconstitution: 17 gram(s) orally once a day, As needed, Constipation    Dispense one month supply  simvastatin 80 mg oral tablet: 1 tab(s) orally once a day (at bedtime)  Zetia 10 mg oral tablet: 0.5 tab(s) orally once a day   acetaminophen 325 mg oral tablet: 2 tab(s) orally every 6 hours, As needed, Moderate Pain (4 - 6)  Actos 30 mg oral tablet: 1 tab(s) orally once a day  amLODIPine 5 mg oral tablet: 1 tab(s) orally once a day  aspirin 81 mg oral tablet: 1 tab(s) orally once a day  glipiZIDE 5 mg oral tablet: 1 tab(s) orally 3 times a day  isosorbide mononitrate 60 mg oral tablet, extended release: 1 tab(s) orally once a day  Keflex 500 mg oral capsule: 1 cap(s) orally 4 times a day   Lasix 40 mg oral tablet: 1 tab(s) orally 2 times a day   metoprolol tartrate 25 mg oral tablet: 1 tab(s) orally 2 times a day  Percocet 2.5/325 325 mg-2.5 mg oral tablet: 1 tab(s) orally every 6 hours, As Needed severe pain MDD:2 tablets  polyethylene glycol 3350 oral powder for reconstitution: 17 gram(s) orally once a day, As needed, Constipation    Dispense one month supply  simvastatin 80 mg oral tablet: 1 tab(s) orally once a day (at bedtime)  Zetia 10 mg oral tablet: 0.5 tab(s) orally once a day

## 2020-11-24 NOTE — DISCHARGE NOTE PROVIDER - CARE PROVIDERS DIRECT ADDRESSES
[email or web address]

## 2020-11-24 NOTE — DISCHARGE NOTE PROVIDER - HOSPITAL COURSE
70 y/oM PMHx HTN, HLD, DM1, CAD s/p multiple PCIs and CABG x 2 in 2017, chronic renal insufficiency (baseline Cr 1.4), previous COVID infection presented to ED 11/22 w. increased COTA and LE edema, admitted for acute HF exacerbation, on IV diuresis pending NST today.     Amlodipine 5mg added to regimen. Ancef 1gq8H added to regiemen for cont'd tx cellulitis as d.w Dr. Sharpe. ECHO s.f EF 60%, no regional wall motion abnormalities, RVSF mildly reduced, trace AR/TR, mild MR . NST: Myocardial perfusion imaging is abnormal. There is a predominantly reversible medium sized defect of mild severity in the apical, mid and basal anterior walls likely consistent with ischemia; given that this defect improves with upright imaging, the possibility of concomitant attenuation artifact cannot be excluded. Overall left ventricular systolic function is normal without regional wall motion abnormalities. The EKG stress test is normal. -- MEDICAL MGMT - Imdur increased to 60mg. 70 y/oM PMHx HTN, HLD, DM1, CAD s/p multiple PCIs and CABG x 2 in 2017, chronic renal insufficiency (baseline Cr 1.4), previous COVID infection presented to ED 11/22 w/ increased COTA and blank LE edema. Reports elevated BP systolic 180-200s, BNP 4373, Trop neg x 2, crea 2.4. EKG revealed SR with first degree AVB, RAD and no ischemic changes. Admitted to cardiac tele 5uris for acute diastolic HF exacerbation, hypertensive urgency and EUGENIE on CKD. He was diuresed w/ IV Lasix 40mg BID, reached euvolemic and transition to PO Lasix 40mg BID. ECHO s/f EF 60%, no regional wall motion abnormalities, RVSF mildly reduced, trace AR/TR, mild MR. Pharmacologic NST: Myocardial perfusion imaging is abnormal. There is a predominantly reversible medium sized defect of mild severity in the apical, mid and basal anterior walls likely consistent with ischemia; given that this defect improves with upright imaging, the possibility of concomitant attenuation artifact cannot be excluded. Overall left ventricular systolic function is normal without regional wall motion abnormalities. The EKG stress test is normal. Discussed finding w/ Dr Sharpe, c/w MEDICAL MGMT - increased Imdur to 60mg qd.    Given EUGENIE on CKD, home Losartan was d/c'ed. His BP improved to SBP 130s and controlled w/ Amlodipine 5mg qd, Imdur 60mg qd, and Lopressor 25mg qd. Renal was following for EUGENIE on CKD. 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 Per Renal, pt to follow up w/ Dr Fry as outpatient.     Of note, pt noted w/ LLE cellulitis on Keflex x 9 days as outpatient, and placed on Ancef inhospital - pt to complete 14 days course per Dr Sharpe. On the day of discharge, the patient was seen and examined. Symptoms improved. Vital signs are stable. Labs and imaging reviewed. Patient is medically optimized and hemodynamically stable. Return precautions discussed w/ pt and wife, medication teach back done, and importance of physician followup emphasized. The patient verbalized understanding. 70 y/oM PMHx HTN, HLD, DM1, CAD s/p multiple PCIs and CABG x 2 in 2017, chronic renal insufficiency (baseline Cr 1.4), previous COVID infection presented to ED 11/22 w/ increased COTA and blank LE edema. Reports elevated BP systolic 180-200s, BNP 4373, Trop neg x 2, crea 2.4. EKG revealed SR with first degree AVB, RAD and no ischemic changes. Admitted to cardiac tele 5uris for acute diastolic HF exacerbation, hypertensive urgency and EUGENIE on CKD. He was diuresed w/ IV Lasix 40mg BID, reached euvolemic and transition to PO Lasix 40mg BID. ECHO s/f EF 60%, no regional wall motion abnormalities, RVSF mildly reduced, trace AR/TR, mild MR. Pharmacologic NST: Myocardial perfusion imaging is abnormal. There is a predominantly reversible medium sized defect of mild severity in the apical, mid and basal anterior walls likely consistent with ischemia; given that this defect improves with upright imaging, the possibility of concomitant attenuation artifact cannot be excluded. Overall left ventricular systolic function is normal without regional wall motion abnormalities. The EKG stress test is normal. Discussed finding w/ Dr Sharpe, c/w MEDICAL MGMT - increased Imdur to 60mg qd.    Given EUGENIE on CKD, home Losartan was d/c'ed. His BP improved to SBP 130s and controlled w/ Amlodipine 5mg qd, Imdur 60mg qd, and Lopressor 25mg BID. Renal was following for EUGENIE on CKD. 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 Per Renal, pt to follow up w/ Dr Fry as outpatient.     Of note, pt noted w/ LLE cellulitis on Keflex x 9 days as outpatient, and placed on Ancef inhospital - pt to complete 14 days course per Dr Sharpe. On the day of discharge, the patient was seen and examined. Symptoms improved. Vital signs are stable. Labs and imaging reviewed. Patient is medically optimized and hemodynamically stable. Return precautions discussed w/ pt and wife, medication teach back done, and importance of physician followup emphasized. The patient verbalized understanding.

## 2020-11-25 ENCOUNTER — NON-APPOINTMENT (OUTPATIENT)
Age: 70
End: 2020-11-25

## 2020-11-27 ENCOUNTER — APPOINTMENT (OUTPATIENT)
Dept: CARE COORDINATION | Facility: HOME HEALTH | Age: 70
End: 2020-11-27

## 2020-11-27 ENCOUNTER — NON-APPOINTMENT (OUTPATIENT)
Age: 70
End: 2020-11-27

## 2020-11-29 DIAGNOSIS — Z79.4 LONG TERM (CURRENT) USE OF INSULIN: ICD-10-CM

## 2020-11-29 DIAGNOSIS — E10.22 TYPE 1 DIABETES MELLITUS WITH DIABETIC CHRONIC KIDNEY DISEASE: ICD-10-CM

## 2020-11-29 DIAGNOSIS — L03.116 CELLULITIS OF LEFT LOWER LIMB: ICD-10-CM

## 2020-11-29 DIAGNOSIS — N17.9 ACUTE KIDNEY FAILURE, UNSPECIFIED: ICD-10-CM

## 2020-11-29 DIAGNOSIS — E78.2 MIXED HYPERLIPIDEMIA: ICD-10-CM

## 2020-11-29 DIAGNOSIS — I50.33 ACUTE ON CHRONIC DIASTOLIC (CONGESTIVE) HEART FAILURE: ICD-10-CM

## 2020-11-29 DIAGNOSIS — I16.0 HYPERTENSIVE URGENCY: ICD-10-CM

## 2020-11-29 DIAGNOSIS — I25.10 ATHEROSCLEROTIC HEART DISEASE OF NATIVE CORONARY ARTERY WITHOUT ANGINA PECTORIS: ICD-10-CM

## 2020-11-29 DIAGNOSIS — I13.0 HYPERTENSIVE HEART AND CHRONIC KIDNEY DISEASE WITH HEART FAILURE AND STAGE 1 THROUGH STAGE 4 CHRONIC KIDNEY DISEASE, OR UNSPECIFIED CHRONIC KIDNEY DISEASE: ICD-10-CM

## 2020-11-29 DIAGNOSIS — N18.4 CHRONIC KIDNEY DISEASE, STAGE 4 (SEVERE): ICD-10-CM

## 2020-11-29 DIAGNOSIS — Z95.1 PRESENCE OF AORTOCORONARY BYPASS GRAFT: ICD-10-CM

## 2020-11-29 DIAGNOSIS — Z86.19 PERSONAL HISTORY OF OTHER INFECTIOUS AND PARASITIC DISEASES: ICD-10-CM

## 2020-11-30 ENCOUNTER — NON-APPOINTMENT (OUTPATIENT)
Age: 70
End: 2020-11-30

## 2020-11-30 ENCOUNTER — APPOINTMENT (OUTPATIENT)
Dept: HEART AND VASCULAR | Facility: CLINIC | Age: 70
End: 2020-11-30
Payer: MEDICARE

## 2020-11-30 VITALS
HEART RATE: 69 BPM | BODY MASS INDEX: 35.46 KG/M2 | HEIGHT: 68 IN | TEMPERATURE: 98.2 F | SYSTOLIC BLOOD PRESSURE: 156 MMHG | WEIGHT: 234 LBS | DIASTOLIC BLOOD PRESSURE: 68 MMHG

## 2020-11-30 VITALS — SYSTOLIC BLOOD PRESSURE: 150 MMHG | DIASTOLIC BLOOD PRESSURE: 90 MMHG

## 2020-11-30 PROCEDURE — 93000 ELECTROCARDIOGRAM COMPLETE: CPT

## 2020-11-30 PROCEDURE — 99214 OFFICE O/P EST MOD 30 MIN: CPT | Mod: 25

## 2020-11-30 RX ORDER — LOSARTAN POTASSIUM 50 MG/1
50 TABLET, FILM COATED ORAL DAILY
Qty: 90 | Refills: 0 | Status: DISCONTINUED | COMMUNITY
Start: 2018-10-04 | End: 2020-11-23

## 2020-11-30 RX ORDER — CEPHALEXIN 500 MG/1
500 CAPSULE ORAL
Qty: 12 | Refills: 0 | Status: DISCONTINUED | COMMUNITY
Start: 2020-11-24

## 2020-11-30 RX ORDER — MUPIROCIN 20 MG/G
2 OINTMENT TOPICAL
Qty: 44 | Refills: 0 | Status: DISCONTINUED | COMMUNITY
Start: 2020-11-13

## 2020-11-30 NOTE — REVIEW OF SYSTEMS
[Recent Weight Loss (___ Lbs)] : recent [unfilled] ~Ulb weight loss [Eyeglasses] : currently wearing eyeglasses [see HPI] : see HPI [Leg Claudication] : intermittent leg claudication [Cough] : cough [Nocturia] : nocturia [Negative] : Heme/Lymph [Fever] : no fever [Headache] : no headache [Recent Weight Gain (___ Lbs)] : no recent weight gain [Chills] : no chills [Feeling Fatigued] : not feeling fatigued [Loss Of Hearing] : no hearing loss [Shortness Of Breath] : no shortness of breath [Dyspnea on exertion] : not dyspnea during exertion [Chest  Pressure] : no chest pressure [Chest Pain] : no chest pain [Lower Ext Edema] : no extremity edema [Palpitations] : no palpitations [Wheezing] : no wheezing [Coughing Up Blood] : no hemoptysis [Hematuria] : no hematuria [Joint Pain] : no joint pain [Muscle Cramps] : no muscle cramps [Confusion] : no confusion was observed [Memory Lapses Or Loss] : no memory lapses or loss [Depression] : no depression [Anxiety] : no anxiety [Under Stress] : not under stress [Suicidal] : not suicidal

## 2020-11-30 NOTE — DISCUSSION/SUMMARY
[FreeTextEntry1] : EKG:NSR,IWMI\par cont Lasix  for volume overload and add norvasc for hptn, BB for CAD; Vytorin for lipids- has elevated LFT(reviewed labs)-a nd will get sono(PCP ordered);DM f/u with PCP- renal insufficiency and f/u labs with Dr naik. will increase norvasc from 5mg to 10mg for better BP control- meds renewed
None

## 2020-11-30 NOTE — HISTORY OF PRESENT ILLNESS
[FreeTextEntry1] : was hospitalized with volume overload and arf\par put on lasix +imdur- lost weight - no sob

## 2020-11-30 NOTE — PHYSICAL EXAM
[General Appearance - Well Developed] : well developed [Normal Appearance] : normal appearance [Well Groomed] : well groomed [General Appearance - Well Nourished] : well nourished [No Deformities] : no deformities [General Appearance - In No Acute Distress] : no acute distress [Normal Conjunctiva] : the conjunctiva exhibited no abnormalities [Normal Jugular Venous A Waves Present] : normal jugular venous A waves present [Normal Jugular Venous V Waves Present] : normal jugular venous V waves present [No Jugular Venous Rice A Waves] : no jugular venous rice A waves [] : no respiratory distress [Respiration, Rhythm And Depth] : normal respiratory rhythm and effort [Exaggerated Use Of Accessory Muscles For Inspiration] : no accessory muscle use [Auscultation Breath Sounds / Voice Sounds] : lungs were clear to auscultation bilaterally [Heart Rate And Rhythm] : heart rate and rhythm were normal [Heart Sounds] : normal S1 and S2 [Murmurs] : no murmurs present [Bowel Sounds] : normal bowel sounds [Abdomen Soft] : soft [Abdomen Tenderness] : non-tender [Abnormal Walk] : normal gait [Skin Color & Pigmentation] : normal skin color and pigmentation [Oriented To Time, Place, And Person] : oriented to person, place, and time [FreeTextEntry1] : trace bipedal edema

## 2020-12-02 ENCOUNTER — NON-APPOINTMENT (OUTPATIENT)
Age: 70
End: 2020-12-02

## 2020-12-09 ENCOUNTER — APPOINTMENT (OUTPATIENT)
Dept: NEPHROLOGY | Facility: CLINIC | Age: 70
End: 2020-12-09
Payer: MEDICARE

## 2020-12-09 VITALS — HEART RATE: 74 BPM | DIASTOLIC BLOOD PRESSURE: 65 MMHG | SYSTOLIC BLOOD PRESSURE: 134 MMHG

## 2020-12-09 DIAGNOSIS — N17.9 ACUTE KIDNEY FAILURE, UNSPECIFIED: ICD-10-CM

## 2020-12-09 DIAGNOSIS — E87.70 FLUID OVERLOAD, UNSPECIFIED: ICD-10-CM

## 2020-12-09 DIAGNOSIS — N18.9 ACUTE KIDNEY FAILURE, UNSPECIFIED: ICD-10-CM

## 2020-12-09 PROCEDURE — 99215 OFFICE O/P EST HI 40 MIN: CPT

## 2020-12-09 NOTE — PHYSICAL EXAM
[General Appearance - Alert] : alert [General Appearance - In No Acute Distress] : in no acute distress [Sclera] : the sclera and conjunctiva were normal [Jugular Venous Distention Increased] : there was no jugular-venous distention [Auscultation Breath Sounds / Voice Sounds] : lungs were clear to auscultation bilaterally [Heart Rate And Rhythm] : heart rate was normal and rhythm regular [Heart Sounds] : normal S1 and S2 [Heart Sounds Pericardial Friction Rub] : no pericardial rub [Systolic grade ___/6] : A grade [unfilled]/6 systolic murmur was heard. [___ +] : bilateral [unfilled]+ pretibial pitting edema [Abdomen Soft] : soft [Abdomen Tenderness] : non-tender [No CVA Tenderness] : no ~M costovertebral angle tenderness [Involuntary Movements] : no involuntary movements were seen [] : no rash [No Focal Deficits] : no focal deficits [Oriented To Time, Place, And Person] : oriented to person, place, and time [Affect] : the affect was normal

## 2020-12-12 PROBLEM — E87.70 FLUID OVERLOAD: Status: ACTIVE | Noted: 2020-11-30

## 2020-12-12 NOTE — ASSESSMENT
[FreeTextEntry1] : CKD with EUGENIE vs progressed CKD-- may be due to DM nephropathy or HTN hx --with progression  in setting of uncontrolled HTN / DM and fluid overload -- r/o other causes - hasn’t had full w/u including urine studies \par Volume status and BP much improved \par reports high sugars -though hgba1c normal?\par will check labs incl repeat hgba1c, urine studies , light chains r/o light chain dz \par will d/w endo if can come off actos with fluid reetention and HTN-- consider alternative for DM -- GLP-1 agonist?\par agree to lower lasix to see if tolerates , amlodipine added watch for worse edema\par may need further w/u based on findings\par f/u here one month\par \par

## 2020-12-12 NOTE — HISTORY OF PRESENT ILLNESS
[FreeTextEntry1] : f/u post admission at [facility] \par pt is a 71 yo W man hx DM with retinopathy, HTN, CKD baseline creat abut 1.6, 1.7 i n August, s/p admit in Nov with ARF in setting of fluid overload, HTN  - rx with diuresis . creat abt 2.4 during admit stable. \par losartan held . actos lowered to 15 mg due to HTN and fluid retention. renal sono unremarkable\par s/p labs as outpt cr 2.4 again \par urine studies not done\par BP has been good at home s/p admit -- 120/60 range checked x 2 only. and swelling much better, no SOB\par was high with Dr Sharpe -increased  amlodipine 5 to 10 mg -- also lowered lasix to 40 mg daily from BID\par \par \par

## 2020-12-21 LAB
25(OH)D3 SERPL-MCNC: 71.4 NG/ML
ALBUMIN MFR SERPL ELPH: 46 %
ALBUMIN SERPL ELPH-MCNC: 3.6 G/DL
ALBUMIN SERPL-MCNC: 3 G/DL
ALBUMIN/GLOB SERPL: 0.8 RATIO
ALP BLD-CCNC: 73 U/L
ALPHA1 GLOB MFR SERPL ELPH: 8.6 %
ALPHA1 GLOB SERPL ELPH-MCNC: 0.6 G/DL
ALPHA2 GLOB MFR SERPL ELPH: 17.4 %
ALPHA2 GLOB SERPL ELPH-MCNC: 1.1 G/DL
ALT SERPL-CCNC: 33 U/L
ANION GAP SERPL CALC-SCNC: 15 MMOL/L
APPEARANCE: CLEAR
AST SERPL-CCNC: 36 U/L
B-GLOBULIN MFR SERPL ELPH: 12.8 %
B-GLOBULIN SERPL ELPH-MCNC: 0.8 G/DL
BACTERIA: NEGATIVE
BILIRUB SERPL-MCNC: 0.3 MG/DL
BILIRUBIN URINE: NEGATIVE
BLOOD URINE: ABNORMAL
BUN SERPL-MCNC: 72 MG/DL
CALCIUM SERPL-MCNC: 8.3 MG/DL
CALCIUM SERPL-MCNC: 8.3 MG/DL
CHLORIDE SERPL-SCNC: 99 MMOL/L
CO2 SERPL-SCNC: 23 MMOL/L
COLOR: YELLOW
CREAT SERPL-MCNC: 2.44 MG/DL
CREAT SPEC-SCNC: 91 MG/DL
CREAT/PROT UR: 1.6 RATIO
DEPRECATED KAPPA LC FREE/LAMBDA SER: 0.85 RATIO
ESTIMATED AVERAGE GLUCOSE: 258 MG/DL
GAMMA GLOB FLD ELPH-MCNC: 1 G/DL
GAMMA GLOB MFR SERPL ELPH: 15.2 %
GLUCOSE QUALITATIVE U: NEGATIVE
GLUCOSE SERPL-MCNC: 277 MG/DL
GRANULAR CASTS: 3 /LPF
HBA1C MFR BLD HPLC: 10.6 %
HYALINE CASTS: 6 /LPF
IGA SER QL IEP: 220 MG/DL
IGG SER QL IEP: 884 MG/DL
IGM SER QL IEP: 176 MG/DL
INTERPRETATION SERPL IEP-IMP: NORMAL
KAPPA LC CSF-MCNC: 5.77 MG/DL
KAPPA LC SERPL-MCNC: 4.88 MG/DL
KETONES URINE: NEGATIVE
LEUKOCYTE ESTERASE URINE: NEGATIVE
M PROTEIN SPEC IFE-MCNC: NORMAL
MICROSCOPIC-UA: NORMAL
NITRITE URINE: NEGATIVE
PARATHYROID HORMONE INTACT: 35 PG/ML
PH URINE: 5.5
PHOSPHATE SERPL-MCNC: 3.8 MG/DL
POTASSIUM SERPL-SCNC: 4.2 MMOL/L
PROT SERPL-MCNC: 6.6 G/DL
PROT UR-MCNC: 149 MG/DL
PROTEIN URINE: ABNORMAL
RED BLOOD CELLS URINE: 50 /HPF
SODIUM SERPL-SCNC: 137 MMOL/L
SPECIFIC GRAVITY URINE: 1.01
SQUAMOUS EPITHELIAL CELLS: 2 /HPF
URATE SERPL-MCNC: 10.3 MG/DL
UROBILINOGEN URINE: NORMAL
WHITE BLOOD CELLS URINE: 6 /HPF

## 2020-12-22 ENCOUNTER — NON-APPOINTMENT (OUTPATIENT)
Age: 70
End: 2020-12-22

## 2020-12-23 ENCOUNTER — APPOINTMENT (OUTPATIENT)
Dept: UROLOGY | Facility: CLINIC | Age: 70
End: 2020-12-23
Payer: MEDICARE

## 2020-12-23 VITALS — HEART RATE: 78 BPM | TEMPERATURE: 98.6 F | SYSTOLIC BLOOD PRESSURE: 147 MMHG | DIASTOLIC BLOOD PRESSURE: 69 MMHG

## 2020-12-23 DIAGNOSIS — R31.21 ASYMPTOMATIC MICROSCOPIC HEMATURIA: ICD-10-CM

## 2020-12-23 PROCEDURE — 99204 OFFICE O/P NEW MOD 45 MIN: CPT

## 2020-12-23 NOTE — ASSESSMENT
[FreeTextEntry1] : Microscopic hematuria\par - UA/UCx\par - Cx bladder\par - Cystoscopy \par \par F/u for cystoscopy

## 2020-12-23 NOTE — END OF VISIT
[FreeTextEntry3] : 69yo male with multiple medical comorbidities, recent microscopic hematuria. Reviewed renal US imaging and report, unremarkable. Recommend cystoscopy. Reviewed procedure, indications and risks.

## 2020-12-23 NOTE — HISTORY OF PRESENT ILLNESS
[FreeTextEntry1] : 70M with hx of DM, HTN, HLD, CKD, LVH, mitral, tricuspid and pulmonary regurgitation, S/p CABG x2\par Here today for microscopic hematuria\par \par UA with 50 HPF of RBC  \par NOV 2020 Renal US: No lesions, no hydronephrosis, no stones \par \par Nocturia 1-2 x's (on lasix), no frequency,  no urgency, no gross hematuria, no dysuria, no incontinence \par \par Never smoker\par No alcohol intake\par \par Family Hx: Denies hx of kidney bladder or prostate cancer. Notes daughter had breast cancer. BRCA testing suggests he is a carrier.

## 2020-12-23 NOTE — PHYSICAL EXAM
[General Appearance - Well Developed] : well developed [General Appearance - Well Nourished] : well nourished [Normal Appearance] : normal appearance [Well Groomed] : well groomed [General Appearance - In No Acute Distress] : no acute distress [Edema] : no peripheral edema [] : no respiratory distress [Respiration, Rhythm And Depth] : normal respiratory rhythm and effort [Exaggerated Use Of Accessory Muscles For Inspiration] : no accessory muscle use [Abdomen Soft] : soft [Abdomen Tenderness] : non-tender [Costovertebral Angle Tenderness] : no ~M costovertebral angle tenderness [Normal Station and Gait] : the gait and station were normal for the patient's age [No Focal Deficits] : no focal deficits [Oriented To Time, Place, And Person] : oriented to person, place, and time [Affect] : the affect was normal [Mood] : the mood was normal [Not Anxious] : not anxious

## 2020-12-24 LAB
APPEARANCE: CLEAR
BACTERIA: NEGATIVE
BILIRUBIN URINE: NEGATIVE
BLOOD URINE: NEGATIVE
COLOR: YELLOW
GLUCOSE QUALITATIVE U: NEGATIVE
HYALINE CASTS: 3 /LPF
KETONES URINE: NEGATIVE
LEUKOCYTE ESTERASE URINE: NEGATIVE
MICROSCOPIC-UA: NORMAL
NITRITE URINE: NEGATIVE
PH URINE: 5.5
PROTEIN URINE: ABNORMAL
RED BLOOD CELLS URINE: 11 /HPF
SPECIFIC GRAVITY URINE: 1.02
SQUAMOUS EPITHELIAL CELLS: 2 /HPF
UROBILINOGEN URINE: NORMAL
WHITE BLOOD CELLS URINE: 1 /HPF

## 2020-12-26 LAB — BACTERIA UR CULT: NORMAL

## 2021-01-04 ENCOUNTER — APPOINTMENT (OUTPATIENT)
Dept: HEART AND VASCULAR | Facility: CLINIC | Age: 71
End: 2021-01-04
Payer: MEDICARE

## 2021-01-04 ENCOUNTER — NON-APPOINTMENT (OUTPATIENT)
Age: 71
End: 2021-01-04

## 2021-01-04 ENCOUNTER — APPOINTMENT (OUTPATIENT)
Dept: UROLOGY | Facility: CLINIC | Age: 71
End: 2021-01-04

## 2021-01-04 VITALS
HEART RATE: 61 BPM | DIASTOLIC BLOOD PRESSURE: 72 MMHG | WEIGHT: 224 LBS | BODY MASS INDEX: 33.95 KG/M2 | TEMPERATURE: 97.6 F | HEIGHT: 68 IN | SYSTOLIC BLOOD PRESSURE: 120 MMHG

## 2021-01-04 PROCEDURE — 93000 ELECTROCARDIOGRAM COMPLETE: CPT

## 2021-01-04 PROCEDURE — 99214 OFFICE O/P EST MOD 30 MIN: CPT | Mod: 25

## 2021-01-04 NOTE — HISTORY OF PRESENT ILLNESS
[FreeTextEntry1] : had labs a few days ago- no cp,sob- improved claudication- on diet and lost weight

## 2021-01-04 NOTE — DISCUSSION/SUMMARY
[FreeTextEntry1] : EKG:NSR\par cont asa +Imdur+Metoprolol for CAD; norvasc for HPTN ;Vytorin for lipids- try DC Imdur a month before next visit

## 2021-01-04 NOTE — REVIEW OF SYSTEMS
[Fever] : no fever [Headache] : no headache [Recent Weight Gain (___ Lbs)] : no recent weight gain [Chills] : no chills [Feeling Fatigued] : not feeling fatigued [Recent Weight Loss (___ Lbs)] : recent [unfilled] ~Ulb weight loss [Eyeglasses] : currently wearing eyeglasses [Loss Of Hearing] : no hearing loss [see HPI] : see HPI [Shortness Of Breath] : no shortness of breath [Dyspnea on exertion] : not dyspnea during exertion [Chest  Pressure] : no chest pressure [Chest Pain] : no chest pain [Lower Ext Edema] : no extremity edema [Leg Claudication] : intermittent leg claudication [Palpitations] : no palpitations [Cough] : cough [Wheezing] : no wheezing [Coughing Up Blood] : no hemoptysis [Hematuria] : no hematuria [Nocturia] : nocturia [Joint Pain] : no joint pain [Muscle Cramps] : no muscle cramps [Confusion] : no confusion was observed [Memory Lapses Or Loss] : no memory lapses or loss [Depression] : no depression [Anxiety] : no anxiety [Under Stress] : not under stress [Suicidal] : not suicidal [Negative] : Heme/Lymph

## 2021-01-04 NOTE — PHYSICAL EXAM
[General Appearance - Well Developed] : well developed [Normal Appearance] : normal appearance [Well Groomed] : well groomed [General Appearance - Well Nourished] : well nourished [No Deformities] : no deformities [General Appearance - In No Acute Distress] : no acute distress [Normal Conjunctiva] : the conjunctiva exhibited no abnormalities [Normal Oral Mucosa] : normal oral mucosa [Normal Jugular Venous A Waves Present] : normal jugular venous A waves present [Normal Jugular Venous V Waves Present] : normal jugular venous V waves present [No Jugular Venous Rice A Waves] : no jugular venous rice A waves [] : no respiratory distress [Respiration, Rhythm And Depth] : normal respiratory rhythm and effort [Exaggerated Use Of Accessory Muscles For Inspiration] : no accessory muscle use [Auscultation Breath Sounds / Voice Sounds] : lungs were clear to auscultation bilaterally [Heart Rate And Rhythm] : heart rate and rhythm were normal [Heart Sounds] : normal S1 and S2 [Murmurs] : no murmurs present [Bowel Sounds] : normal bowel sounds [Abdomen Soft] : soft [Abdomen Tenderness] : non-tender [Abnormal Walk] : normal gait [FreeTextEntry1] : trace bipedal edema [Skin Color & Pigmentation] : normal skin color and pigmentation [Oriented To Time, Place, And Person] : oriented to person, place, and time

## 2021-02-04 ENCOUNTER — APPOINTMENT (OUTPATIENT)
Dept: NEPHROLOGY | Facility: CLINIC | Age: 71
End: 2021-02-04
Payer: MEDICARE

## 2021-02-04 DIAGNOSIS — E83.39 OTHER DISORDERS OF PHOSPHORUS METABOLISM: ICD-10-CM

## 2021-02-04 PROCEDURE — 99443: CPT | Mod: 95

## 2021-02-04 NOTE — REASON FOR VISIT
[Home] : at home, [unfilled] , at the time of the visit. [Medical Office: ([facility])___] : at the medical office located in  [Verbal consent obtained from patient] : the patient, [unfilled] [Follow-Up] : a follow-up visit

## 2021-02-04 NOTE — ASSESSMENT
[FreeTextEntry1] : CKD with some progression --likely DM nephropathy. creat improved and now stable\par Low grade proteinuria  only\par BP and volume seem improved by report\par persistent microhematuria uncertain cause -- IgAN possible- r/o bladder lesion-- refused cysto\par high phos\par \par reviewed need to do cysto to r/o bladder tumor\par try restart low dose ARB now that renal fxn improved -- for renal protection with DM and low grade proteinuria \par reviewed phos restriction and take tums qmeal as phos binder\par can consider renal bx still to r/o GN (IgAN would be most likely ) (bartolo if  w/u neg) - though may not nchange mgt as unlikely good steroid candidate and w/o high grade proteinuria (and stabale CKD)  suspect not strong indication even if IgA\par also reveiwed importance good DM control - f/u endo, continued weight loss-\par f/u 2 mos\par \par \par \par

## 2021-02-04 NOTE — HISTORY OF PRESENT ILLNESS
[FreeTextEntry1] : Discussed with patient: You have chosen to receive care through the use of tele-media. It enables health care providers at different locations to provide safe, effective, and convenient care through the use of technology. Please note this is a billable encounter. As with any health care service, there are risks associated with the use of tele-media, including  issues. You understand that I cannot physically examine you and that you may need to come to the office to complete the assessment. \par -Patient agreed verbally and understands the risks and benefits of tele-media as explained. All questions regarding tele-media encounters were answered\par \par phone visit conducted with patient and wife\par has no complaints \par on diet lost some weight \par BP good on recent recent with Dr Sharpe-- doesn’t check at home\par no swelling --still on lasix-- and lower dose actos\par saw  but refused cysto for rmicrohematuria\par meds reviewed with pt and list updated\par labs done and reviewed - see below\par \par \par \par \par

## 2021-03-16 NOTE — ED ADULT TRIAGE NOTE - AS TEMP SITE
HPI:      OVERNIGHT EVENTS:      REVIEW OF SYSTEMS:      CONSTITUTIONAL: No fever  EYES: no acute visual disturbances  NECK: No pain or stiffness  RESPIRATORY: No cough; No shortness of breath  CARDIOVASCULAR: No chest pain, no palpitations  GASTROINTESTINAL: No pain. No nausea, vomiting or diarrhea   NEUROLOGICAL: No headache or numbness, no tremors  MUSCULOSKELETAL: No joint pain, no muscle pain  GENITOURINARY: no dysuria, no frequency, no hesitancy  PSYCHIATRY: no depression, no anxiety  ALL OTHER  ROS negative        Vital Signs Last 24 Hrs  T(C): 36.6 (16 Mar 2021 05:05), Max: 37 (15 Mar 2021 19:56)  T(F): 97.8 (16 Mar 2021 05:05), Max: 98.6 (15 Mar 2021 19:56)  HR: 68 (16 Mar 2021 05:05) (60 - 81)  BP: 144/69 (16 Mar 2021 05:05) (116/60 - 148/60)  BP(mean): --  RR: 20 (16 Mar 2021 05:05) (19 - 20)  SpO2: 96% (16 Mar 2021 05:05) (94% - 96%)    ________________________________________________  PHYSICAL EXAM:    GENERAL: NAD  HEENT: Normocephalic; conjunctivae and sclerae clear;  NECK : supple, no JVD  CHEST/LUNG: Clear to auscultation  HEART: S1 S2  regular  ABDOMEN: Soft, Nontender, Nondistended; Bowel sounds present  EXTREMITIES: no cyanosis; no LE edema; no calf tenderness  SKIN: warm and dry  NERVOUS SYSTEM:  Alert; no new deficits    _________________________________________________  CURRENT MEDICATIONS:    MEDICATIONS  (STANDING):  dexAMETHasone     Tablet 6 milliGRAM(s) Oral daily  enoxaparin Injectable 40 milliGRAM(s) SubCutaneous daily  insulin lispro (ADMELOG) corrective regimen sliding scale   SubCutaneous three times a day before meals  pantoprazole    Tablet 40 milliGRAM(s) Oral before breakfast    MEDICATIONS  (PRN):  acetaminophen   Tablet .. 650 milliGRAM(s) Oral every 6 hours PRN Temp greater or equal to 38C (100.4F), Moderate Pain (4 - 6)  ALBUTerol    90 MICROgram(s) HFA Inhaler 2 Puff(s) Inhalation every 6 hours PRN Shortness of Breath and/or Wheezing  guaiFENesin   Syrup  (Sugar-Free) 100 milliGRAM(s) Oral every 6 hours PRN Cough      __________________________________________________  LABS:                          13.7   6.84  )-----------( 159      ( 15 Mar 2021 05:38 )             40.8     03-15    140  |  106  |  15  ----------------------------<  104<H>  3.7   |  25  |  0.81    Ca    7.6<L>      15 Mar 2021 05:38  Phos  2.9     -15  Mg     2.3     03-15    TPro  6.1  /  Alb  2.3<L>  /  TBili  0.6  /  DBili  x   /  AST  147<H>  /  ALT  170<H>  /  AlkPhos  72  -15      Urinalysis Basic - ( 14 Mar 2021 17:56 )    Color: Yellow / Appearance: Clear / S.015 / pH: x  Gluc: x / Ketone: Negative  / Bili: Negative / Urobili: 1   Blood: x / Protein: 100 / Nitrite: Negative   Leuk Esterase: Negative / RBC: 0-2 /HPF / WBC 0-2 /HPF   Sq Epi: x / Non Sq Epi: Few /HPF / Bacteria: Few /HPF      CAPILLARY BLOOD GLUCOSE          __________________________________________________  RADIOLOGY & ADDITIONAL TESTS:    Imaging Personally Reviewed:  YES      Consultant(s) Notes Reviewed:   YES     Plan of care was discussed with patient and /or primary care giver; all questions and concerns were addressed and care was aligned with patient's wishes.    Plan discussed with attending and consulting physicians.   HPI:  54 YOM admitted with symptoms related to COVID-19.  Started on Remdesivir and Dexamethasone.  Pt found to have antibodies, Remdesivir discontinued.  SPO2 96% on 15L NRB  Seen and examined at bedside.  Pt endorses SOB, denies pain or NVD.    OVERNIGHT EVENTS:  No new overnight events     REVIEW OF SYSTEMS:      CONSTITUTIONAL: No fever  EYES: no acute visual disturbances  NECK: No pain or stiffness  RESPIRATORY: + cough; + shortness of breath  CARDIOVASCULAR: No chest pain, no palpitations  GASTROINTESTINAL: No pain. No nausea, vomiting or diarrhea   NEUROLOGICAL: No headache or numbness, no tremors  MUSCULOSKELETAL: No joint pain, no muscle pain  GENITOURINARY: no dysuria, no frequency, no hesitancy  PSYCHIATRY: no depression, no anxiety  ALL OTHER  ROS negative        Vital Signs Last 24 Hrs  T(C): 36.6 (16 Mar 2021 05:05), Max: 37 (15 Mar 2021 19:56)  T(F): 97.8 (16 Mar 2021 05:05), Max: 98.6 (15 Mar 2021 19:56)  HR: 68 (16 Mar 2021 05:05) (60 - 81)  BP: 144/69 (16 Mar 2021 05:05) (116/60 - 148/60)  BP(mean): --  RR: 20 (16 Mar 2021 05:05) (19 - 20)  SpO2: 96% (16 Mar 2021 05:05) (94% - 96%)    ________________________________________________  PHYSICAL EXAM:    GENERAL: NAD  HEENT: Normocephalic; conjunctivae and sclerae clear;  NECK : supple, no JVD  CHEST/LUNG: Clear to auscultation  HEART: S1 S2  regular  ABDOMEN: Soft, Nontender, Nondistended; Bowel sounds present  EXTREMITIES: no cyanosis; no LE edema; no calf tenderness  SKIN: warm and dry  NERVOUS SYSTEM:  Alert; no new deficits    _________________________________________________  CURRENT MEDICATIONS:    MEDICATIONS  (STANDING):  dexAMETHasone     Tablet 6 milliGRAM(s) Oral daily  enoxaparin Injectable 40 milliGRAM(s) SubCutaneous daily  insulin lispro (ADMELOG) corrective regimen sliding scale   SubCutaneous three times a day before meals  pantoprazole    Tablet 40 milliGRAM(s) Oral before breakfast    MEDICATIONS  (PRN):  acetaminophen   Tablet .. 650 milliGRAM(s) Oral every 6 hours PRN Temp greater or equal to 38C (100.4F), Moderate Pain (4 - 6)  ALBUTerol    90 MICROgram(s) HFA Inhaler 2 Puff(s) Inhalation every 6 hours PRN Shortness of Breath and/or Wheezing  guaiFENesin   Syrup  (Sugar-Free) 100 milliGRAM(s) Oral every 6 hours PRN Cough      __________________________________________________  LABS:                          13.7   6.84  )-----------( 159      ( 15 Mar 2021 05:38 )             40.8     03-15    140  |  106  |  15  ----------------------------<  104<H>  3.7   |  25  |  0.81    Ca    7.6<L>      15 Mar 2021 05:38  Phos  2.9     -15  Mg     2.3     -15    TPro  6.1  /  Alb  2.3<L>  /  TBili  0.6  /  DBili  x   /  AST  147<H>  /  ALT  170<H>  /  AlkPhos  72  03-15      Urinalysis Basic - ( 14 Mar 2021 17:56 )    Color: Yellow / Appearance: Clear / S.015 / pH: x  Gluc: x / Ketone: Negative  / Bili: Negative / Urobili: 1   Blood: x / Protein: 100 / Nitrite: Negative   Leuk Esterase: Negative / RBC: 0-2 /HPF / WBC 0-2 /HPF   Sq Epi: x / Non Sq Epi: Few /HPF / Bacteria: Few /HPF      CAPILLARY BLOOD GLUCOSE          __________________________________________________  RADIOLOGY & ADDITIONAL TESTS:    Imaging Personally Reviewed:  YES    < from: Xray Chest 1 View-PORTABLE IMMEDIATE (21 @ 13:14) >  Findings/  Impression: The heart is unremarkable. Large airspace opacity left mid and lower lung.    < end of copied text >    Consultant(s) Notes Reviewed:   YES     Plan of care was discussed with patient and /or primary care giver; all questions and concerns were addressed and care was aligned with patient's wishes.    Plan discussed with attending and consulting physicians.   oral

## 2021-04-05 ENCOUNTER — RX RENEWAL (OUTPATIENT)
Age: 71
End: 2021-04-05

## 2021-04-07 ENCOUNTER — APPOINTMENT (OUTPATIENT)
Dept: NEPHROLOGY | Facility: CLINIC | Age: 71
End: 2021-04-07
Payer: MEDICARE

## 2021-04-07 DIAGNOSIS — R31.29 OTHER MICROSCOPIC HEMATURIA: ICD-10-CM

## 2021-04-07 PROCEDURE — 99443: CPT | Mod: 95

## 2021-04-07 RX ORDER — ISOSORBIDE MONONITRATE 60 MG/1
60 TABLET, EXTENDED RELEASE ORAL
Qty: 1 | Refills: 1 | Status: DISCONTINUED | COMMUNITY
Start: 2020-11-24 | End: 2021-04-07

## 2021-04-07 NOTE — ASSESSMENT
[FreeTextEntry1] : CKD likely DM nephropathy (proteinuria , hx retinopathy and uncontrolled DM) \par s/p admit with fluid overload and EUGENIE -- creat improved close to baseline and reports good volume now\par BP still appears high \par will try restart losartan -- at lower dose - 25 mg/d -- and titrate as tolerates\par advised pt d/w endo re SGLT-2 blocker with accumulating data of significant CV and renal benefits in DM nephropathy\par also advised pt complete  w/u for microhematuria including cysto-- discussed possiblilty of bladder tumor that need to be r/o\par it is possible that could have IgAN though it is unlikely that making dx via bx would change rx -- main mgt would likely be ARB and SGLT-2 as well (based on DAPA - CKD  study) \par f/u 2 mos with labs \par \par

## 2021-04-07 NOTE — HISTORY OF PRESENT ILLNESS
[FreeTextEntry1] : Discussed with patient: You have chosen to receive care through the use of tele-media. It enables health care providers at different locations to provide safe, effective, and convenient care through the use of technology. Please note this is a billable encounter. As with any health care service, there are risks associated with the use of tele-media, including  issues. You understand that I cannot physically examine you and that you may need to come to the office to complete the assessment. \par -Patient agreed verbally and understands the risks and benefits of tele-media as explained. All questions regarding tele-media encounters were answered\par \par phone visit conducted with pt\par \par has no complaints --no SOB, edema \par says was taken off imdur\par hasn’t checked home BP -but thinks is good \par did BP now and is 152/66, pulse 62\par has not followed up with  for cysto\par labs done and reviewed - see below\par meds reviewed with pt and list updated\par PCP DR Sharpe\par endo-- Dr Caballero  F- [phone]\par \par \par \par

## 2021-04-12 ENCOUNTER — APPOINTMENT (OUTPATIENT)
Dept: HEART AND VASCULAR | Facility: CLINIC | Age: 71
End: 2021-04-12
Payer: MEDICARE

## 2021-04-12 ENCOUNTER — NON-APPOINTMENT (OUTPATIENT)
Age: 71
End: 2021-04-12

## 2021-04-12 VITALS
HEIGHT: 68 IN | HEART RATE: 66 BPM | SYSTOLIC BLOOD PRESSURE: 130 MMHG | BODY MASS INDEX: 34.56 KG/M2 | TEMPERATURE: 97.2 F | WEIGHT: 228 LBS | DIASTOLIC BLOOD PRESSURE: 66 MMHG

## 2021-04-12 PROCEDURE — 99214 OFFICE O/P EST MOD 30 MIN: CPT | Mod: 25

## 2021-04-12 PROCEDURE — 93000 ELECTROCARDIOGRAM COMPLETE: CPT

## 2021-04-12 NOTE — PHYSICAL EXAM
[General Appearance - Well Developed] : well developed [Normal Appearance] : normal appearance [Well Groomed] : well groomed [General Appearance - Well Nourished] : well nourished [No Deformities] : no deformities [General Appearance - In No Acute Distress] : no acute distress [Normal Conjunctiva] : the conjunctiva exhibited no abnormalities [Normal Oral Mucosa] : normal oral mucosa [Normal Jugular Venous A Waves Present] : normal jugular venous A waves present [Normal Jugular Venous V Waves Present] : normal jugular venous V waves present [No Jugular Venous Rice A Waves] : no jugular venous rice A waves [] : no respiratory distress [Respiration, Rhythm And Depth] : normal respiratory rhythm and effort [Exaggerated Use Of Accessory Muscles For Inspiration] : no accessory muscle use [Auscultation Breath Sounds / Voice Sounds] : lungs were clear to auscultation bilaterally [Heart Rate And Rhythm] : heart rate and rhythm were normal [Heart Sounds] : normal S1 and S2 [Murmurs] : no murmurs present [FreeTextEntry1] : decreased peripheral pulses [Bowel Sounds] : normal bowel sounds [Abdomen Soft] : soft [Abdomen Tenderness] : non-tender [Abnormal Walk] : normal gait [Nail Clubbing] : no clubbing of the fingernails [Skin Color & Pigmentation] : normal skin color and pigmentation [Oriented To Time, Place, And Person] : oriented to person, place, and time

## 2021-04-12 NOTE — HISTORY OF PRESENT ILLNESS
[FreeTextEntry1] : no cp,sob- losartan added by Dr anik- has chronic claudication- he can't further define it

## 2021-04-12 NOTE — DISCUSSION/SUMMARY
[FreeTextEntry1] : EKG:NSR\par cont losartan + norvasc for HPTN; Lopressor +asa for CAD; Vytorin for lipids\par meds renewed\par advised weight loss- needs f/u lipid levels- no treatment for chronic caludication- advised to call if sx of claudication improves

## 2021-05-11 NOTE — ED ADULT TRIAGE NOTE - RESPIRATORY RATE (BREATHS/MIN)
Patient reports that he completed the steroid Dosepak and did not go to vestibular rehab as he googled vestibular exercises.  He states that he is able to perform them at home and the room spinning has improved.  He does report intermittent lightheadedness.   18

## 2021-07-20 ENCOUNTER — APPOINTMENT (OUTPATIENT)
Dept: NEPHROLOGY | Facility: CLINIC | Age: 71
End: 2021-07-20
Payer: MEDICARE

## 2021-07-20 DIAGNOSIS — N18.32 CHRONIC KIDNEY DISEASE, STAGE 3B: ICD-10-CM

## 2021-07-20 DIAGNOSIS — R80.9 PROTEINURIA, UNSPECIFIED: ICD-10-CM

## 2021-07-20 DIAGNOSIS — E11.21 TYPE 2 DIABETES MELLITUS WITH DIABETIC NEPHROPATHY: ICD-10-CM

## 2021-07-20 PROCEDURE — 99442: CPT | Mod: 95

## 2021-07-20 NOTE — HISTORY OF PRESENT ILLNESS
[FreeTextEntry1] : Discussed with patient: You have chosen to receive care through the use of tele-media. It enables health care providers at different locations to provide safe, effective, and convenient care through the use of technology. Please note this is a billable encounter. As with any health care service, there are risks associated with the use of tele-media, including  issues. You understand that I cannot physically examine you and that you may need to come to the office to complete the assessment. \par -Patient agreed verbally and understands the risks and benefits of tele-media as explained. All questions regarding tele-media encounters were answered\par \par phone visit conducted with pt\par has no complaints\par BP and DM have been controlled-- BP around 130 /80\par tried losartan 50 mg at one point and stopped for itching --tolerating 25 mg w/o problem\par meds reviewed with pt and list updated\par labs done and reviewed - see below\par endo-- Dr Caballero F- [phone]\par \par

## 2021-07-20 NOTE — ASSESSMENT
[FreeTextEntry1] : ckd 3 with proteinuria/ DM nephropathy-- stable function\par reports BP controlled \par lytes good\par will try again to increase losartan to 50 mg-- doesn’t seem to make sense that higher dose would cause pruritis (unless different ) -- or can try 25 BID\par discussed consider SGLT-2i- discussed potential benefits -- pt will d.w endo \par f/u 3 mos with labs  \par

## 2021-07-20 NOTE — REASON FOR VISIT
[Home] : at home, [unfilled] , at the time of the visit. [Medical Office: ([facility])___] : at the medical office located in  [Verbal consent obtained from patient] : the patient, [unfilled] [Follow-Up] : a follow-up visit

## 2021-08-24 ENCOUNTER — APPOINTMENT (OUTPATIENT)
Dept: HEART AND VASCULAR | Facility: CLINIC | Age: 71
End: 2021-08-24
Payer: MEDICARE

## 2021-08-24 ENCOUNTER — NON-APPOINTMENT (OUTPATIENT)
Age: 71
End: 2021-08-24

## 2021-08-24 VITALS
HEIGHT: 68 IN | HEART RATE: 60 BPM | TEMPERATURE: 97.6 F | DIASTOLIC BLOOD PRESSURE: 74 MMHG | BODY MASS INDEX: 34.25 KG/M2 | WEIGHT: 226 LBS | SYSTOLIC BLOOD PRESSURE: 140 MMHG

## 2021-08-24 PROCEDURE — 93000 ELECTROCARDIOGRAM COMPLETE: CPT

## 2021-08-24 PROCEDURE — 99214 OFFICE O/P EST MOD 30 MIN: CPT | Mod: 25

## 2021-08-24 NOTE — REASON FOR VISIT
[Hyperlipidemia] : hyperlipidemia [Hypertension] : hypertension [Coronary Artery Disease] : coronary artery disease [Carotid, Aortic and Peripheral Vascular Disease] : carotid, aortic and peripheral vascular disease [Spouse] : spouse

## 2021-08-24 NOTE — REVIEW OF SYSTEMS
[SOB] : no shortness of breath [Dyspnea on exertion] : not dyspnea during exertion [Chest Discomfort] : no chest discomfort [Lower Ext Edema] : no extremity edema [Leg Claudication] : intermittent leg claudication [Palpitations] : no palpitations [Hematuria] : no hematuria [Nocturia] : nocturia [Negative] : Heme/Lymph

## 2021-08-24 NOTE — PHYSICAL EXAM
[Well Developed] : well developed [Well Nourished] : well nourished [No Acute Distress] : no acute distress [Normal Conjunctiva] : normal conjunctiva [Normal Venous Pressure] : normal venous pressure [No Carotid Bruit] : no carotid bruit [Normal S1, S2] : normal S1, S2 [No Murmur] : no murmur [No Rub] : no rub [No Gallop] : no gallop [Clear Lung Fields] : clear lung fields [Good Air Entry] : good air entry [No Respiratory Distress] : no respiratory distress  [Soft] : abdomen soft [Non Tender] : non-tender [No Masses/organomegaly] : no masses/organomegaly [Normal Bowel Sounds] : normal bowel sounds [Normal Gait] : normal gait [No Cyanosis] : no cyanosis [No Clubbing] : no clubbing [No Varicosities] : no varicosities [Diminished Pedal Pulses ___] : diminished pedal pulses [unfilled] [No Rash] : no rash [No Skin Lesions] : no skin lesions [Moves all extremities] : moves all extremities [No Focal Deficits] : no focal deficits [Normal Speech] : normal speech [Alert and Oriented] : alert and oriented [Normal memory] : normal memory [de-identified] : trace bipedal edema

## 2021-08-24 NOTE — DISCUSSION/SUMMARY
[FreeTextEntry1] : EKG:NSR,first degree AVB(? deeper q wave in AVF\par advised continued exercise for claudication and weight loss- advised vascular evaluation( doesn't want to)- reveiwed labs done July\par LDL not at goal( 77mg%)- advised better diet/weight loss and continue Vytorin\par cont asa + BB for CAD; losartan + norvasc  for hptn

## 2021-09-14 NOTE — ED PROVIDER NOTE - CROS ED HEME ALL NEG
\"Bedside\"} shift change report given to Julien Casiano (oncoming nurse) by Silas Curiel (offgoing nurse). negative...

## 2021-09-26 ENCOUNTER — RX RENEWAL (OUTPATIENT)
Age: 71
End: 2021-09-26

## 2021-10-10 ENCOUNTER — RX RENEWAL (OUTPATIENT)
Age: 71
End: 2021-10-10

## 2022-01-27 ENCOUNTER — APPOINTMENT (OUTPATIENT)
Dept: HEART AND VASCULAR | Facility: CLINIC | Age: 72
End: 2022-01-27

## 2022-03-15 ENCOUNTER — APPOINTMENT (OUTPATIENT)
Dept: HEART AND VASCULAR | Facility: CLINIC | Age: 72
End: 2022-03-15

## 2022-03-29 ENCOUNTER — APPOINTMENT (OUTPATIENT)
Dept: HEART AND VASCULAR | Facility: CLINIC | Age: 72
End: 2022-03-29
Payer: MEDICARE

## 2022-03-29 ENCOUNTER — NON-APPOINTMENT (OUTPATIENT)
Age: 72
End: 2022-03-29

## 2022-03-29 VITALS
BODY MASS INDEX: 36.37 KG/M2 | DIASTOLIC BLOOD PRESSURE: 62 MMHG | OXYGEN SATURATION: 96 % | WEIGHT: 240 LBS | HEIGHT: 68 IN | HEART RATE: 68 BPM | SYSTOLIC BLOOD PRESSURE: 132 MMHG

## 2022-03-29 DIAGNOSIS — I35.9 NONRHEUMATIC AORTIC VALVE DISORDER, UNSPECIFIED: ICD-10-CM

## 2022-03-29 DIAGNOSIS — I36.1 NONRHEUMATIC TRICUSPID (VALVE) INSUFFICIENCY: ICD-10-CM

## 2022-03-29 PROCEDURE — 93000 ELECTROCARDIOGRAM COMPLETE: CPT

## 2022-03-29 PROCEDURE — 93306 TTE W/DOPPLER COMPLETE: CPT

## 2022-03-29 PROCEDURE — 99214 OFFICE O/P EST MOD 30 MIN: CPT | Mod: 25

## 2022-03-29 NOTE — PHYSICAL EXAM
[Well Developed] : well developed [Well Nourished] : well nourished [No Acute Distress] : no acute distress [Normal Conjunctiva] : normal conjunctiva [Normal Venous Pressure] : normal venous pressure [No Carotid Bruit] : no carotid bruit [Normal S1, S2] : normal S1, S2 [No Murmur] : no murmur [No Rub] : no rub [No Gallop] : no gallop [Clear Lung Fields] : clear lung fields [Good Air Entry] : good air entry [No Respiratory Distress] : no respiratory distress  [Soft] : abdomen soft [Non Tender] : non-tender [No Masses/organomegaly] : no masses/organomegaly [Normal Bowel Sounds] : normal bowel sounds [Normal Gait] : normal gait [No Cyanosis] : no cyanosis [No Clubbing] : no clubbing [No Varicosities] : no varicosities [Diminished Pedal Pulses ___] : diminished pedal pulses [unfilled] [No Rash] : no rash [No Skin Lesions] : no skin lesions [Moves all extremities] : moves all extremities [No Focal Deficits] : no focal deficits [Normal Speech] : normal speech [Alert and Oriented] : alert and oriented [Normal memory] : normal memory [de-identified] : O/W [de-identified] : trace bipedal edema

## 2022-03-29 NOTE — HISTORY OF PRESENT ILLNESS
[FreeTextEntry1] : gained 15 lbs- sees vascular and advised support stockings which he does not wear regularly- no cp/sob- has chronic bilateral leg claudication- dioesn't walk too much

## 2022-03-29 NOTE — DISCUSSION/SUMMARY
[FreeTextEntry1] : EKG:NSR,First Degree AVB\par ECHO: thickened functionally bicuspid AV with TR/AR \par RV not well seen\par advised weight loss and needs to walk as treatment for claudication( and vascular f/u)\par \par reviewed labs he brought in done NOV\par WAF=766.8( was much lower in august) - advised better diet/weight loss and continue Vytorin- i discussed that his target LDL <70mg% and advised Repatha he will not take injection\par  cont amlodipine +losartan for hPTN, BB +ASA for CAD

## 2022-04-08 NOTE — PHYSICAL EXAM
[General Appearance - Well Developed] : well developed [General Appearance - Well Nourished] : well nourished [Normal Appearance] : normal appearance [Well Groomed] : well groomed [General Appearance - In No Acute Distress] : no acute distress [Edema] : no peripheral edema [] : no respiratory distress [Respiration, Rhythm And Depth] : normal respiratory rhythm and effort [Exaggerated Use Of Accessory Muscles For Inspiration] : no accessory muscle use Detail Level: Detailed [Abdomen Soft] : soft [Abdomen Tenderness] : non-tender [Costovertebral Angle Tenderness] : no ~M costovertebral angle tenderness [Normal Station and Gait] : the gait and station were normal for the patient's age [No Focal Deficits] : no focal deficits [Oriented To Time, Place, And Person] : oriented to person, place, and time [Affect] : the affect was normal [Mood] : the mood was normal [Not Anxious] : not anxious

## 2022-05-04 RX ORDER — EZETIMIBE AND SIMVASTATIN 10; 80 MG/1; MG/1
10-80 TABLET ORAL
Qty: 1 | Refills: 1 | Status: ACTIVE | COMMUNITY
Start: 2019-03-01 | End: 1900-01-01

## 2022-05-18 NOTE — DIETITIAN INITIAL EVALUATION ADULT. - PROBLEM SELECTOR PLAN 1
Order placed for ECHO for continued monitoring during use of Herceptin.  Awaiting return call from cardiology scheduling.   
- Pt currently CP free  - Troponin negative x 1, serial troponins  - EKG in ER NSR @ 75 BPM  - Given ASA 325mg and Plavix 600mg prior to cardiac cath  - Heparin gtt started in the ED  - NPO prior to Cardiac Cath scheduled for today 02/16/2017

## 2022-06-17 NOTE — INPATIENT CERTIFICATION FOR MEDICARE PATIENTS - PHYSICIAN CONCUR
I concur with the Admission Order and I certify that services are provided in accordance with Section 42 CFR § 412.3 day(s)/1 Home

## 2022-06-20 ENCOUNTER — OFFICE (OUTPATIENT)
Dept: URBAN - METROPOLITAN AREA CLINIC 76 | Facility: CLINIC | Age: 72
Setting detail: OPHTHALMOLOGY
End: 2022-06-20
Payer: MEDICARE

## 2022-06-20 DIAGNOSIS — H43.813: ICD-10-CM

## 2022-06-20 DIAGNOSIS — H35.423: ICD-10-CM

## 2022-06-20 DIAGNOSIS — H44.23: ICD-10-CM

## 2022-06-20 DIAGNOSIS — E11.3293: ICD-10-CM

## 2022-06-20 DIAGNOSIS — H52.4: ICD-10-CM

## 2022-06-20 DIAGNOSIS — H35.373: ICD-10-CM

## 2022-06-20 DIAGNOSIS — H35.40: ICD-10-CM

## 2022-06-20 PROCEDURE — 92250 FUNDUS PHOTOGRAPHY W/I&R: CPT | Performed by: OPHTHALMOLOGY

## 2022-06-20 PROCEDURE — 92014 COMPRE OPH EXAM EST PT 1/>: CPT | Performed by: OPHTHALMOLOGY

## 2022-06-20 PROCEDURE — 92015 DETERMINE REFRACTIVE STATE: CPT | Performed by: OPHTHALMOLOGY

## 2022-06-20 ASSESSMENT — TONOMETRY
OD_IOP_MMHG: 16
OS_IOP_MMHG: 16

## 2022-06-20 ASSESSMENT — REFRACTION_CURRENTRX
OD_ADD: +2.75
OS_CYLINDER: -1.00
OS_ADD: +2.75
OD_OVR_VA: 20/
OD_AXIS: 94
OD_CYLINDER: -1.00
OD_SPHERE: -1.50
OS_SPHERE: -1.50
OS_OVR_VA: 20/
OS_AXIS: 74

## 2022-06-20 ASSESSMENT — AXIALLENGTH_DERIVED
OD_AL: 24.3487
OS_AL: 24.4529
OS_AL: 24.3487
OD_AL: 24.5054

## 2022-06-20 ASSESSMENT — REFRACTION_AUTOREFRACTION
OS_AXIS: 80
OS_CYLINDER: -2.00
OD_SPHERE: -1.50
OD_AXIS: 100
OD_CYLINDER: -1.75
OS_SPHERE: -1.25

## 2022-06-20 ASSESSMENT — CONFRONTATIONAL VISUAL FIELD TEST (CVF)
OD_FINDINGS: FULL
OS_FINDINGS: FULL

## 2022-06-20 ASSESSMENT — KERATOMETRY
OD_K1POWER_DIOPTERS: 43.25
OD_K2POWER_DIOPTERS: 44.00
OS_AXISANGLE_DEGREES: 1
OS_K2POWER_DIOPTERS: 44.25
OS_K1POWER_DIOPTERS: 43.00
OD_AXISANGLE_DEGREES: 8

## 2022-06-20 ASSESSMENT — SPHEQUIV_DERIVED
OD_SPHEQUIV: -2.375
OD_SPHEQUIV: -2
OS_SPHEQUIV: -2
OS_SPHEQUIV: -2.25

## 2022-06-20 ASSESSMENT — REFRACTION_MANIFEST
OD_VA1: 20/25
OD_SPHERE: -1.25
OD_ADD: +2.75
OS_CYLINDER: -1.50
OS_AXIS: 80
OD_AXIS: 100
OS_VA1: 20/25
OD_CYLINDER: -1.50
OS_ADD: +2.75
OS_SPHERE: -1.25

## 2022-06-20 ASSESSMENT — VISUAL ACUITY
OD_BCVA: 20/30
OS_BCVA: 20/30

## 2022-08-08 NOTE — PROGRESS NOTE ADULT - SUBJECTIVE AND OBJECTIVE BOX
Subjective   Patient ID: Germaine is a 12 month old female who is accompanied by: parents WHO ARE THE HISTORIANS FOR THIS VISIT    Well Child 12 Month    HPI   No recent illnesses    Additional concerns today: feedings    Nutrition  4-5 oz/day  => 25oz/day   10 oz/breast milk + 15 oz AR formula  Purees 1x/day    Sleep  8 hr stretch at noc  Catnaps + 1 big nap with occ 2 bigger naps daily    Elimination  BMs q 1-2 days  Lots of UO    Receiving Speech/OT/PT     No known allergies    Daily medication: FLOVENT      Alkindi sprinkles - prescribed by Endocrinology     Poly-Vi-Sol with iron    Chronic Medical Issues: H/O prematurity      Adrenal insufficiency - followed by Endocrinology (Dr. Sexton)      H/O ROP      H/O esotropia      H/O anemia    No change in Family Medical Hx/no DM or sudden cardiac death in immediate family per parents    Review of Systems  All reviewed and are negative aside from that listed above at this encounter    Objective   Vitals: Temp 98.2 °F (36.8 °C) (Temporal)   Ht 25.2\" (64 cm)   Wt (!) 6.535 kg (14 lb 6.5 oz)   HC 41 cm (16.14\")   BMI 15.95 kg/m²   BSA 0.33 m²   Growth parameters are noted and are appropriate for age.  Physical Exam  Alert/active, pink and in no distress  HEENT:   Head: Normocephalic, atraumatic.  AF soft and flat   Eyes: SHY, EOMI, Conj & Sclerae normal. MILD transient alternating esotropia noted. + red reflex ana paula    Ears: TMs and canals are normal   Throat: No erythema, edema, exudate   Nares: No discharge  Neck: no masses or thyromegaly  Chest: symmetrical and no masses  Lungs: clear & equal BS  CVS: Normal S1/S2. No murmur or femoral delay. Pulses 2+ and symmetrical  Abd: Normal BS. NO distention/masses/HSM.  Well healed surgical scar from NEC surgical resection/repair  Hips: normal exam.  Negative Ortolani/Osuna exams. Ant/Post leg creases symmetrical  : normal for age  Back: no grossscoliosis or CVA tenderness  Extr: Normal and symmetrical  Neuro: Normal and  INTERVAL HISTORY:  siiting in chair, no c/o cp,sob  	      MEDICATIONS:  metoprolol 12.5milliGRAM(s) Oral every 6 hours    ceFAZolin   IVPB 2000milliGRAM(s) IV Intermittent every 8 hours        famotidine    Tablet 20milliGRAM(s) Oral daily    atorvastatin 40milliGRAM(s) Oral at bedtime  insulin glargine Injectable (LANTUS) 10Unit(s) SubCutaneous every morning  insulin lispro Injectable (HumaLOG) 2Unit(s) SubCutaneous three times a day before meals  insulin lispro (HumaLOG) corrective regimen sliding scale  SubCutaneous Before meals and at bedtime  dextrose Gel 1Dose(s) Oral once PRN  dextrose 50% Injectable 12.5Gram(s) IV Push once  dextrose 50% Injectable 25Gram(s) IV Push once  dextrose 50% Injectable 25Gram(s) IV Push once  glucagon  Injectable 1milliGRAM(s) IntraMuscular once PRN    sodium chloride 0.45%. 1000milliLiter(s) IV Continuous <Continuous>  heparin  Injectable 7500Unit(s) SubCutaneous every 8 hours  clopidogrel Tablet 75milliGRAM(s) Oral daily  aspirin enteric coated 81milliGRAM(s) Oral daily  dextrose 5%. 1000milliLiter(s) IV Continuous <Continuous>          PHYSICAL EXAM:  T(C): 37.3, Max: 37.4 (02-21 @ 01:00)  HR: 92 (74 - 96)  BP: 107/52 (83/48 - 110/54)  RR: 21 (4 - 23)  SpO2: 99% (94% - 100%)  Wt(kg): --  I&O's Summary    I & Os for current day (as of 21 Feb 2017 07:17)  =============================================  IN: 2077.5 ml / OUT: 1805 ml / NET: 272.5 ml        Appearance: Normal	  HEENT:   Normal oral mucosa, PERRL, EOMI	  Lymphatic: No lymphadenopathy  Cardiovascular: Normal S1 S2, No JVD, No murmurs, No edema  Respiratory: Lungs basilar ronchi/rales	  Psychiatry: A & O x 3, Mood & affect appropriate  Gastrointestinal:  Soft, Non-tender, 	  Skin: No rashes, No ecchymoses, No cyanosis  Neurologic: Non-focal  Extremities: Normal range of motion, No clubbing, cyanosis or edema  Vascular: Peripheral pulses palpable 2+ bilaterally    TELEMETRY:nsr	    ECG:  	  RADIOLOGY:   DIAGNOSTIC TESTING:  [ ] Echocardiogram:  [ ]  Catheterization:  [ ] Stress Test:    OTHER: 	    LABS:	 	    CARDIAC MARKERS:                                  8.8    10.0  )-----------( 113      ( 21 Feb 2017 03:27 )             25.8     21 Feb 2017 03:29    143    |  109    |  26     ----------------------------<  110    4.2     |  22     |  1.71     Ca    8.3        21 Feb 2017 03:29  Phos  4.7       21 Feb 2017 03:29  Mg     2.2       21 Feb 2017 03:29    TPro  5.3    /  Alb  3.2    /  TBili  0.7    /  DBili  x      /  AST  47     /  ALT  31     /  AlkPhos  30     21 Feb 2017 03:29    proBNP:   Lipid Profile:   HgA1c:   TSH:     ASSESSMENT/PLAN: 	  s/p cabg- creatinine increasing-off pressors, cont beta blockade, monitor renal function symmetrical tone  Skin: No rash    Assessment   Healthy appearing 12 month old female  Developmental delay  H/O 26 wk premature twin birth  Alternating exotropia  Adrenal insufficiency    Screening tests  ASQ:SE-2 Score:50  Developmental milestones were reviewed and showed mild delay in motor skills, but socially appropriate    PLAN:  Safety, Diet progression/exercise for age, dental care and avoidance of infection ideas reviewed.  Immunizations today: per orders.  History of previous adverse reactions to immunizations? no  Immunizations given today and vaccine counseling including benefits, risks, and adverse reactions were provided by myself during the visit.  Needs Hgb recheck - CBC ordered to be done with any Endocrine labs ordered at this month's Endocrine f/u visit  TO continue current OP therapies  Keep NICU 8/18/22 f/u clinic appt  Follow-up for the 15 month well child visit, or sooner as needed.

## 2022-10-03 ENCOUNTER — APPOINTMENT (OUTPATIENT)
Dept: HEART AND VASCULAR | Facility: CLINIC | Age: 72
End: 2022-10-03

## 2022-10-03 ENCOUNTER — NON-APPOINTMENT (OUTPATIENT)
Age: 72
End: 2022-10-03

## 2022-10-03 VITALS
DIASTOLIC BLOOD PRESSURE: 67 MMHG | TEMPERATURE: 97.3 F | SYSTOLIC BLOOD PRESSURE: 160 MMHG | HEIGHT: 68 IN | BODY MASS INDEX: 36.37 KG/M2 | HEART RATE: 73 BPM | WEIGHT: 240 LBS

## 2022-10-03 VITALS — DIASTOLIC BLOOD PRESSURE: 68 MMHG | SYSTOLIC BLOOD PRESSURE: 130 MMHG

## 2022-10-03 PROCEDURE — 93000 ELECTROCARDIOGRAM COMPLETE: CPT

## 2022-10-03 PROCEDURE — 99214 OFFICE O/P EST MOD 30 MIN: CPT | Mod: 25

## 2022-10-03 NOTE — DISCUSSION/SUMMARY
[FreeTextEntry1] : EKG:NSR,First degree AVB- advised to walk and f/u vascular for claudication-\par revewied recent labs\par LDL not at goal- he was prescribed repatha in past but was too expensive- cont Vytorin for HLD and will try to reorder repatha for HLD' asa +Lo[pressor for CAD;lipitor for HLD

## 2022-10-03 NOTE — PHYSICAL EXAM
[Well Developed] : well developed [Well Nourished] : well nourished [No Acute Distress] : no acute distress [Normal Conjunctiva] : normal conjunctiva [Normal Venous Pressure] : normal venous pressure [No Carotid Bruit] : no carotid bruit [Normal S1, S2] : normal S1, S2 [No Murmur] : no murmur [No Rub] : no rub [No Gallop] : no gallop [Clear Lung Fields] : clear lung fields [Good Air Entry] : good air entry [No Respiratory Distress] : no respiratory distress  [Soft] : abdomen soft [Non Tender] : non-tender [No Masses/organomegaly] : no masses/organomegaly [Normal Bowel Sounds] : normal bowel sounds [Normal Gait] : normal gait [No Edema] : no edema [No Cyanosis] : no cyanosis [No Clubbing] : no clubbing [No Varicosities] : no varicosities [Diminished Pedal Pulses ___] : diminished pedal pulses [unfilled] [No Rash] : no rash [No Skin Lesions] : no skin lesions [Moves all extremities] : moves all extremities [No Focal Deficits] : no focal deficits [Normal Speech] : normal speech [Alert and Oriented] : alert and oriented [Normal memory] : normal memory [de-identified] : O/W

## 2022-10-07 NOTE — ED PROVIDER NOTE - HEME LYMPH, MLM
This is a 73 year old White male       Chief Complaint   Patient presents with   • Follow-up     GOA, CPPD, Gout, Osteoarthritis of left knee, Vitamin D insufficiency       PROBLEM LIST:  1. Gout, crystal proven both intracellular and extracellular uric acid crystals, hyperuricemia, currently on allopurinol 300 mg daily.     Most recent uric acid was 5.4 mg/dl on 3/11/2021.    No history of kidney stones or tophi.     2. Severe osteoarthritis of left knee medial compartment.   Status post intra-articular cortisone injection with some improvement.  Status post left knee Monovisc injection, more beneficial.    3. Bilateral hand pain with synovitis in MCPs and crepitus on motion, radiographic evidence of hook like osteophytes in MCPs, consistent with CPPD and pseudo-osteoarthritis/rheumatoid arthritis.     4.  Lymphedema, status post inpatient stay for IV diuresis, currently on Lasix, which may cause hyperuricemia.      5. Chronic kidney disease stage 3.      6. Vitamin D insufficiency with vitamin-D level of 22 ng/ml.     7. Other medical problems include:  Patient Active Problem List   Diagnosis   • COPD (chronic obstructive pulmonary disease) (CMS/[facility])   • Class 3 drug-induced obesity with serious comorbidity and body mass index (BMI) of 50.0 to 59.9 in adult (CMS/[facility])   • Pure hypercholesterolemia   • Obstructive sleep apnea, adult   • Essential hypertension   • Vitamin D insufficiency   • Erectile dysfunction   • Type 2 diabetes mellitus with stage 3 chronic kidney disease, without long-term current use of insulin (CMS/[facility])   • Pain in both lower legs   • Neuropathy   • Hyperuricemia   • Stage 3 chronic kidney disease (CMS/[facility])   • Chronic combined systolic and diastolic CHF (congestive heart failure) (CMS/[facility])   • Encounter for therapeutic drug monitoring   • Long term (current) use of anticoagulants   • Chronic atrial fibrillation (CMS/[facility])   • Chronic venous stasis dermatitis of both lower extremities   •  Chronic gout of multiple sites   • Primary osteoarthritis of both knees   • Bilateral hand pain   • Calcium pyrophosphate deposition disease (CPPD)   • Primary osteoarthritis of left knee   • Generalized osteoarthritis   • Diabetic peripheral neuropathy (CMS/[facility])   • Therapeutic drug monitoring   • Long term current use of anticoagulant therapy       Past Surgical History:   Procedure Laterality Date   • Colonoscopy  04/05/2013    polyps, f/u due 04/2018   • Colonoscopy  12/25/2019    Dr. Ernst, inpatient, due to GI bleed.   • Eye surgery         Allergies as of 10/07/2022 - Reviewed 10/07/2022   Allergen Reaction Noted   • Levaquin GI UPSET      Current Outpatient Medications   Medication Sig Dispense Refill   • HYDROcodone-acetaminophen (NORCO) 5-325 MG per tablet Take 1 tablet by mouth every 6 hours as needed for Pain. 90 tablet 0   • tolterodine (DETROL LA) 4 MG 24 hr capsule Take 1 capsule by mouth daily. 90 capsule 1   • naLOXone (NARCAN) 4 MG/0.1ML nasal spray Spray the content of 1 device into 1 nostril. Call 911. May repeat with 2nd device in alternate nostril if no response in 2-3 minutes. 2 each 1   • insulin glargine (Lantus SoloStar) 100 UNIT/ML pen-injector Inject 36 Units into the skin nightly. Prime 2 units before each dose. Max dose of 40 units 30 mL 1   • pregabalin (LYRICA) 150 MG capsule TAKE ONE CAPSULE BY MOUTH TWICE DAILY 180 capsule 1   • insulin aspart (NovoLOG FlexPen) 100 UNIT/ML pen-injector Prime 2 units before each dose. 12 units with breakfast, 16 units with lunch and 18 units dinner + SS 1:25>150 TDD 92 units 90 mL 1   • budesonide (PULMICORT) 0.5 MG/2ML nebulizer suspension INHALE ONE vial via NEBULIZER EVERY  mL 3   • warfarin (COUMADIN) 2.5 MG tablet 5 mg every M, Th; 3.75 mg all other days or as directed by [facility] 180 tablet 1   • furosemide (LASIX) 40 MG tablet TAKE TWO TABLETS BY MOUTH TWICE DAILY 360 tablet 1   • potassium CHLORIDE (KLOR-CON M) 20 MEQ  maria luisa ER tablet TAKE ONE TABLET BY MOUTH EVERY DAY 90 tablet 1   • metoPROLOL succinate (TOPROL-XL) 100 MG 24 hr tablet TAKE ONE TABLET BY MOUTH EVERY DAY 90 tablet 1   • blood glucose (OneTouch Verio) test strip Test blood sugar 4 times daily as directed. Meter: One Touch Verio 400 strip 3   • OneTouch Delica Lancets 33G Misc Test blood sugar 4 times daily as directed. Meter: One Touch Verio 400 each 3   • blood glucose meter Test blood sugar 4 times daily as directed. Meter: One Touch Verio 1 kit 0   • Lancet Device [facility] Lancet pen for use with One Touch lancets. 1 each prn   • pravastatin (PRAVACHOL) 10 MG tablet Take 1 tablet by mouth nightly. 90 tablet 3   • allopurinol (ZYLOPRIM) 300 MG tablet TAKE ONE TABLET BY MOUTH EVERY DAY 90 tablet 3   • albuterol (ACCUNEB) 1.25 MG/3ML nebulizer solution INHALE ONE VIAL BY NEBULIZER EVERY 4 HOURS AS NEEDED FOR WHEEZING 900 mL 3   • Easy Comfort Pen Needles 31G X 8 MM Misc Inject insulin as directed. 180 each 3   • TURMERIC PO      • Ferrous Sulfate (IRON) 325 (65 Fe) MG Tab Take 1 tablet by mouth daily.     • Cholecalciferol 125 mcg (5,000 units) capsule Take 125 mcg by mouth daily.     • Omega-3 Fatty Acids (FISH OIL PO) Take 1,000 mg by mouth daily.        No current facility-administered medications for this visit.       FAMILY AND SOCIAL HISTORY:  Unchanged. Please see my previous note dated 3/2/ for more details.    INTERVAL HISTORY:  The patient returns on 10/7/2022 for follow up of the above-mentioned rheumatological problems. He continues with allopurinol without difficulty. Denies recent flare of gout. He is somewhat compliant with gout diet. In addition, on warfarin.     At his last visit with me on 6/6/22 I performed a left knee Monovisc injection without difficulty. He found benefit from the injection.     He does have joint pain today involving his left knee. He states knee pain is 2-3/10 in general, mainly related to mechanical symptoms in his knees. He does  experience mechanical symptoms, such as locking and catching with knee instability. The pain has been intermittent. He does not have joint swelling. He does not experience morning stiffness. He has difficulty with walking, climbing stairs, descending stairs, getting up from a chair, touching feet while seated, reaching behind back, reaching behind head, going to sleep, obtaining restful sleep, working, morning stiffness. He uses a cane. He does experience fatigue. Denies fevers, chills or night sweats. His weight has been stable.      Other symptoms today include runny nose, high blood pressure, shortness of breath, swollen legs or feet, wheezing (asthma).     Recent laboratory tests were reviewed with the patient, showing creatinine was 1.25 mg/dL with estimated GFR of 55 mL/min/1.73m2, glucose was 180 mg/dL, CBC was unremarkable, cholesterol/HDL ratio was 4.0, HGB A1C was 7.1%.     REVIEW OF SYSTEMS:  See history of present illness. See nursing note attached for Patient History/Systems Review form.    PHYSICAL EXAMINATION:  General: Alert, awake, oriented times 3, not in acute distress. Pleasant and appears stated age.  Difficulty in ambulating.  Vital Signs: Blood pressure 126/70, pulse 60, height 5' 7\" (1.702 m), weight (!) 136.5 kg (301 lb).  Skin: Diffuse bruising.  No palpable purpura, psoriasis or livedo on the upper and lower extremities.    HEENT:  Pink palpebral conjunctivae, anicteric sclerae.   Neck: Supple, no carotid bruit, thyromegaly or lymphadenopathy with no palpable mass.   Extremities: Notable bipedal edema, worse on left side.       Musculoskeletal:   Notable for bony hypertrophy along multiple DIPs and PIPs without synovial thickening.  First CMC squaring bilaterally.  Wrists and elbows with good range of motion without swelling.    Shoulders with good range of motion without pain on motion.    Hips without tenderness.   Knees are cool without effusion. Normal range of motion. Crepitus on  motion, worse on left.    Ankles are nontender. Normal range of motion.   Metatarsal squeeze test negative bilaterally.     LABS:  Lab Results   Component Value Date    WBC 4.6 09/20/2022    RBC 4.76 09/20/2022    HGB 14.1 09/20/2022    HCT 44.3 09/20/2022    MCV 93.1 09/20/2022    MCH 29.6 09/20/2022    MCHC 31.8 (L) 09/20/2022    RDWCV 14.3 09/20/2022    SEG 62 09/20/2022    TLYMPH 26 09/20/2022    PMON 8 09/20/2022    PEOS 2 09/20/2022    PBASO 1 09/20/2022    ANEUT 2.8 09/20/2022    ALYMS 1.2 09/20/2022    DIANA 0.4 09/20/2022    AEOS 0.1 09/20/2022    ABASO 0.0 09/20/2022     ESR (mm/hr)   Date Value   11/18/2019 111 (H)   06/03/2019 58 (H)     C-REACTIVE PROTEIN (mg/dL)   Date Value   01/14/2020 3.6 (H)   11/18/2019 8.7 (H)   11/16/2019 20.7 (H)     Vitamin D, 25-Hydroxy (ng/mL)   Date Value   07/22/2022 35.0     Uric Acid (mg/dL)   Date Value   03/11/2021 5.4     Lab Results   Component Value Date    SODIUM 143 09/20/2022    POTASSIUM 4.4 09/20/2022    CHLORIDE 104 09/20/2022    CO2 29 09/20/2022    CREATININE 1.35 (H) 09/20/2022    TOTPROTEIN 6.7 09/20/2022    ALBUMIN 3.8 09/20/2022    CALCIUM 8.9 09/20/2022    BILIRUBIN 0.4 09/20/2022    ALKPT 80 09/20/2022    GPT 23 09/20/2022    BCRAT 18 09/20/2022    GLOB 2.9 09/20/2022    GFRNA 38 01/20/2020    GFRA 44 01/20/2020       ASSESSMENT AND PLAN:  1. Severe osteoarthritis of left knee medial compartment.     Status post Monovisc injection, more beneficial with improvement in mechanical symptoms.  Repeat every 6 months as needed.    2. Bilateral hand pain with synovitis in metacarpal-phalangeals and crepitus on motion, CPPD with pseudo-osteoarthritis, confirmed with bilateral hand x-ray from 05/31/2022.  Tolerable at this time.     3. Gout, intercritical stage, crystal proven with both intracellular and extracellular uric acid crystals, hyperuricemia, currently on allopurinol with good control of uric acid.   No history of kidney stones or tophi.    Continue  allopurinol and gout diet.     4. Generalized osteoarthritis.  Continue with pain management.        5. Vitamin D insufficiency.  Continue supplementation.      I informed the patient that I will be leaving my practice on October 27, 2022. I would recommend he follow with sports medicine for lubricant injections. Referral placed today. The patient was advised to call back should any questions or concerns arise in the interim.      Portions of this note were pulled forward from the previous office visit, reviewed and edited as appropriate.         On 10/7/2022, I, Carline Salter MA scribed the services personally performed by Eddi Scott MD      The documentation recorded by the scribe accurately and completely reflects the service(s) I personally performed and the decisions made by me.     No adenopathy or splenomegaly. No cervical or inguinal lymphadenopathy.

## 2023-01-11 NOTE — DISCHARGE NOTE ADULT - PHYSICIAN SECTION COMPLETE
Yes
PAST SURGICAL HISTORY:  H/O foot surgery left leg- childhood    History of  section 2016    History of colonoscopy

## 2023-01-23 ENCOUNTER — APPOINTMENT (OUTPATIENT)
Dept: HEART AND VASCULAR | Facility: CLINIC | Age: 73
End: 2023-01-23
Payer: MEDICARE

## 2023-01-23 VITALS
BODY MASS INDEX: 36.68 KG/M2 | TEMPERATURE: 97.5 F | HEIGHT: 68 IN | WEIGHT: 242 LBS | OXYGEN SATURATION: 97 % | DIASTOLIC BLOOD PRESSURE: 66 MMHG | SYSTOLIC BLOOD PRESSURE: 158 MMHG | HEART RATE: 75 BPM

## 2023-01-23 VITALS — DIASTOLIC BLOOD PRESSURE: 70 MMHG | SYSTOLIC BLOOD PRESSURE: 130 MMHG

## 2023-01-23 DIAGNOSIS — I25.10 ATHEROSCLEROTIC HEART DISEASE OF NATIVE CORONARY ARTERY W/OUT ANGINA PECTORIS: ICD-10-CM

## 2023-01-23 PROCEDURE — 93000 ELECTROCARDIOGRAM COMPLETE: CPT

## 2023-01-23 PROCEDURE — 99214 OFFICE O/P EST MOD 30 MIN: CPT | Mod: 25

## 2023-01-23 NOTE — PHYSICAL EXAM
[Well Developed] : well developed [Well Nourished] : well nourished [No Acute Distress] : no acute distress [Normal Conjunctiva] : normal conjunctiva [Normal Venous Pressure] : normal venous pressure [No Carotid Bruit] : no carotid bruit [Normal S1, S2] : normal S1, S2 [No Murmur] : no murmur [No Rub] : no rub [No Gallop] : no gallop [Clear Lung Fields] : clear lung fields [Good Air Entry] : good air entry [No Respiratory Distress] : no respiratory distress  [Soft] : abdomen soft [Non Tender] : non-tender [No Masses/organomegaly] : no masses/organomegaly [Normal Bowel Sounds] : normal bowel sounds [Normal Gait] : normal gait [No Edema] : no edema [No Cyanosis] : no cyanosis [No Clubbing] : no clubbing [No Varicosities] : no varicosities [Diminished Pedal Pulses ___] : diminished pedal pulses [unfilled] [No Rash] : no rash [No Skin Lesions] : no skin lesions [Moves all extremities] : moves all extremities [No Focal Deficits] : no focal deficits [Normal Speech] : normal speech [Alert and Oriented] : alert and oriented [Normal memory] : normal memory [de-identified] : O/W

## 2023-01-23 NOTE — DISCUSSION/SUMMARY
[FreeTextEntry1] : EKG:NSR,First degree AVB.?iwmI(no change)\par advised exercise/walking for claudication\par reviewed recent labs - LDL not at goal- intolerant of Crestor/Lipitor- will try Praluent and needs to lose weight\par continue Norvasc +losartan for HPTN; asa +BB for CAD

## 2023-01-23 NOTE — HISTORY OF PRESENT ILLNESS
[FreeTextEntry1] : no c/o CP/sob- has 3 block claudication\par had recent labs
Detail Level: Zone
Detail Level: Generalized

## 2023-05-02 ENCOUNTER — APPOINTMENT (OUTPATIENT)
Dept: HEART AND VASCULAR | Facility: CLINIC | Age: 73
End: 2023-05-02

## 2023-05-30 ENCOUNTER — APPOINTMENT (OUTPATIENT)
Dept: HEART AND VASCULAR | Facility: CLINIC | Age: 73
End: 2023-05-30

## 2023-06-01 ENCOUNTER — NON-APPOINTMENT (OUTPATIENT)
Age: 73
End: 2023-06-01

## 2023-06-02 VITALS
OXYGEN SATURATION: 97 % | SYSTOLIC BLOOD PRESSURE: 181 MMHG | HEART RATE: 77 BPM | DIASTOLIC BLOOD PRESSURE: 81 MMHG | WEIGHT: 240.08 LBS | RESPIRATION RATE: 20 BRPM | HEIGHT: 68 IN | TEMPERATURE: 98 F

## 2023-06-02 LAB
ALBUMIN SERPL ELPH-MCNC: 4 G/DL — SIGNIFICANT CHANGE UP (ref 3.3–5)
ALP SERPL-CCNC: 58 U/L — SIGNIFICANT CHANGE UP (ref 40–120)
ALT FLD-CCNC: 22 U/L — SIGNIFICANT CHANGE UP (ref 10–45)
ANION GAP SERPL CALC-SCNC: 13 MMOL/L — SIGNIFICANT CHANGE UP (ref 5–17)
AST SERPL-CCNC: 26 U/L — SIGNIFICANT CHANGE UP (ref 10–40)
BASOPHILS # BLD AUTO: 0.03 K/UL — SIGNIFICANT CHANGE UP (ref 0–0.2)
BASOPHILS NFR BLD AUTO: 0.5 % — SIGNIFICANT CHANGE UP (ref 0–2)
BILIRUB SERPL-MCNC: 0.4 MG/DL — SIGNIFICANT CHANGE UP (ref 0.2–1.2)
BUN SERPL-MCNC: 53 MG/DL — HIGH (ref 7–23)
CALCIUM SERPL-MCNC: 9.1 MG/DL — SIGNIFICANT CHANGE UP (ref 8.4–10.5)
CHLORIDE SERPL-SCNC: 101 MMOL/L — SIGNIFICANT CHANGE UP (ref 96–108)
CO2 SERPL-SCNC: 21 MMOL/L — LOW (ref 22–31)
CREAT SERPL-MCNC: 1.61 MG/DL — HIGH (ref 0.5–1.3)
EGFR: 45 ML/MIN/1.73M2 — LOW
EOSINOPHIL # BLD AUTO: 0.03 K/UL — SIGNIFICANT CHANGE UP (ref 0–0.5)
EOSINOPHIL NFR BLD AUTO: 0.5 % — SIGNIFICANT CHANGE UP (ref 0–6)
GLUCOSE SERPL-MCNC: 213 MG/DL — HIGH (ref 70–99)
HCT VFR BLD CALC: 35.2 % — LOW (ref 39–50)
HGB BLD-MCNC: 11.4 G/DL — LOW (ref 13–17)
IMM GRANULOCYTES NFR BLD AUTO: 0.3 % — SIGNIFICANT CHANGE UP (ref 0–0.9)
LYMPHOCYTES # BLD AUTO: 0.52 K/UL — LOW (ref 1–3.3)
LYMPHOCYTES # BLD AUTO: 8.3 % — LOW (ref 13–44)
MCHC RBC-ENTMCNC: 30.9 PG — SIGNIFICANT CHANGE UP (ref 27–34)
MCHC RBC-ENTMCNC: 32.4 GM/DL — SIGNIFICANT CHANGE UP (ref 32–36)
MCV RBC AUTO: 95.4 FL — SIGNIFICANT CHANGE UP (ref 80–100)
MONOCYTES # BLD AUTO: 0.52 K/UL — SIGNIFICANT CHANGE UP (ref 0–0.9)
MONOCYTES NFR BLD AUTO: 8.3 % — SIGNIFICANT CHANGE UP (ref 2–14)
NEUTROPHILS # BLD AUTO: 5.15 K/UL — SIGNIFICANT CHANGE UP (ref 1.8–7.4)
NEUTROPHILS NFR BLD AUTO: 82.1 % — HIGH (ref 43–77)
NRBC # BLD: 0 /100 WBCS — SIGNIFICANT CHANGE UP (ref 0–0)
PLATELET # BLD AUTO: 158 K/UL — SIGNIFICANT CHANGE UP (ref 150–400)
POTASSIUM SERPL-MCNC: 4.4 MMOL/L — SIGNIFICANT CHANGE UP (ref 3.5–5.3)
POTASSIUM SERPL-SCNC: 4.4 MMOL/L — SIGNIFICANT CHANGE UP (ref 3.5–5.3)
PROT SERPL-MCNC: 6.7 G/DL — SIGNIFICANT CHANGE UP (ref 6–8.3)
RBC # BLD: 3.69 M/UL — LOW (ref 4.2–5.8)
RBC # FLD: 14.7 % — HIGH (ref 10.3–14.5)
SODIUM SERPL-SCNC: 135 MMOL/L — SIGNIFICANT CHANGE UP (ref 135–145)
TROPONIN T SERPL-MCNC: 0.05 NG/ML — CRITICAL HIGH (ref 0–0.01)
WBC # BLD: 6.27 K/UL — SIGNIFICANT CHANGE UP (ref 3.8–10.5)
WBC # FLD AUTO: 6.27 K/UL — SIGNIFICANT CHANGE UP (ref 3.8–10.5)

## 2023-06-02 PROCEDURE — 71045 X-RAY EXAM CHEST 1 VIEW: CPT | Mod: 26

## 2023-06-02 PROCEDURE — 99285 EMERGENCY DEPT VISIT HI MDM: CPT

## 2023-06-02 NOTE — ED PROVIDER NOTE - PHYSICAL EXAMINATION
General:  Well appearing, no distress  HEENT:  No conjunctival injection, neck supple, no congestion   Chest:  Non-tender, no crepitance  Lungs:  Clear to auscultation bilaterally   Heart:  s1s2 normal, no murmur  Abdomen:  soft, non-tender, non-distended  :  Deferred  Rectal:  Deferred  Extremities: No edema, normal perfusion, no joint swelling or tenderness  Neuro:  Alert, conversant, motor/sensory grossly intact   Psychiatry:  Calm, cooperative, no expression of suicidal or homicidal ideation General:  Mild tachypnea, but comfortable appearing, good color   HEENT:  No conjunctival injection, neck supple, no congestion   Chest:  Non-tender, no crepitance  Lungs:  Clear to auscultation bilaterally   Heart:  s1s2 normal, no murmur  Abdomen:  soft, non-tender, non-distended  :  Deferred  Rectal:  Deferred  Extremities: No edema, normal perfusion, no joint swelling or tenderness  Neuro:  Alert, conversant, motor/sensory grossly intact   Psychiatry:  Calm, cooperative, no expression of suicidal or homicidal ideation

## 2023-06-02 NOTE — ED PROVIDER NOTE - OBJECTIVE STATEMENT
74 yo M reporting substernal chest pain 3 hours ago.  Was not going to come to the ER but his children called the ambulance  Had  en route from EMS  No pain at present.  Had SOB noted by family at time of symptoms       PMH: CAD, HTN, HLD, DM   ALL:NKDA  SURG:  CABG Initial hx with abnormal ek yo M reporting substernal chest pain 3 hours ago.  Was not going to come to the ER but his children called the ambulance  Had  en route from EMS  No pain at present.  Had SOB noted by family at time of symptoms     STEMI called.    11p: Case d/w Cath attending Roby and EKGs sent for his review.    11:04p: Calls back.  Agrees EKG concerning but in absence of CP rec STAT echo.  Fellow on other line and is informed of case/need for STAT echo  11:10:  Family and patient informed of plan and additional history obtained:    Patient has been having CP for the past week when walking.  It is a burning substernal pain, non-radiating.  Today, pain was worse, same quality and with walking only.  Family noted when he got home that he didn't look well, was SOB and unusually fatigued.      PMH: CAD, HTN, HLD, DM   ALL:NKDA  SURG:  CABG    11:20:  Fellow at bedside.  Dr. Dell leonard. Initial hx with abnormal ek yo M reporting substernal chest pain 3 hours ago.  Was not going to come to the ER but his children called the ambulance  Had  en route from EMS  No pain at present.  Had SOB noted by family at time of symptoms     STEMI called.    11p: Case d/w Cath attending Roby and EKGs sent for his review.    11:04p: Calls back.  Agrees EKG concerning but in absence of CP rec STAT echo.  Fellow on other line and is informed of case/need for STAT echo  11:10:  Family and patient informed of plan and additional history obtained:    Patient has been having CP for the past week when walking.  It is a burning substernal pain, non-radiating.  Today, pain was worse, same quality and with walking only.  Family noted when he got home that he didn't look well, was SOB and unusually fatigued.      PMH: CAD, HTN, HLD, DM   ALL:NKDA  SURG:  CABG    11:20:  Fellow at bedside.  Dr. Sharpe paged.  Dr. Sharpe agrees with plan to admit  00:15:  Family informed of results and plan.  Patient to be admitted to Cardiac tele.

## 2023-06-02 NOTE — ED ADULT NURSE NOTE - BEFORE THE ILLNESS OR INJURY
Vaccine Information Statement(s) for was given today. This has been reviewed, questions answered, and verbal consent given by Parent for injection(s) and administration of Influenza (Inactivated).    1. Does the patient have a moderate to severe fever?  No  2. Has the patient had a serious reaction to a flu shot before?   No  3. Has the patient ever had Guillian Power Syndrome within 6 weeks of a previous flu shot?  No  4. Does the patient have a serious allergy to eggs?  No  5. Is the patient less that 6 months of age?  No    Patient is eligible to receive the vaccine based on all questions being answered as 'No'.        Patient tolerated without incident. See immunization grid for documentation.    
No

## 2023-06-02 NOTE — ED ADULT NURSE NOTE - OBJECTIVE STATEMENT
Pt bibems from home c/o chest pain and SOB exertion starting this AM. Denies CP or SOB at rest. No fevers, coughing, N/V/D, urinary symptoms, dizziness. Takes aspirin 81mg daily.  Hx of stents and CABG. Pt A&Ox4, speaking in clear/full sentences, no acute distress. STEMI code activated, MD at bedside. EKG performed. 18g access. CCM initiated. Four 81mg aspirin given by EMS

## 2023-06-02 NOTE — ED PROVIDER NOTE - CLINICAL SUMMARY MEDICAL DECISION MAKING FREE TEXT BOX
72 yo M PMH CAD, DM, HTN, HLD with escalating exertional chest pain, eKG concerning for STEMI.  Please see HPI for ongoing discussion.

## 2023-06-02 NOTE — ED ADULT NURSE NOTE - NSFALLUNIVINTERV_ED_ALL_ED
Bed/Stretcher in lowest position, wheels locked, appropriate side rails in place/Call bell, personal items and telephone in reach/Instruct patient to call for assistance before getting out of bed/chair/stretcher/Non-slip footwear applied when patient is off stretcher/[place] to call system/Physically safe environment - no spills, clutter or unnecessary equipment/Purposeful proactive rounding/Room/bathroom lighting operational, light cord in reach

## 2023-06-02 NOTE — ED ADULT TRIAGE NOTE - CHIEF COMPLAINT QUOTE
Pt bibems from home due to chest pain and dyspnea on exertion starting this AM. Denies CP or SOB at rest. No fevers, coughing. Hx of stents and CABG.

## 2023-06-03 ENCOUNTER — INPATIENT (INPATIENT)
Facility: HOSPITAL | Age: 73
LOS: 3 days | Discharge: HOME | DRG: 246 | End: 2023-06-07
Attending: INTERNAL MEDICINE | Admitting: INTERNAL MEDICINE
Payer: MEDICARE

## 2023-06-03 DIAGNOSIS — I21.4 NON-ST ELEVATION (NSTEMI) MYOCARDIAL INFARCTION: ICD-10-CM

## 2023-06-03 DIAGNOSIS — Z95.1 PRESENCE OF AORTOCORONARY BYPASS GRAFT: Chronic | ICD-10-CM

## 2023-06-03 DIAGNOSIS — E11.9 TYPE 2 DIABETES MELLITUS WITHOUT COMPLICATIONS: ICD-10-CM

## 2023-06-03 DIAGNOSIS — E78.5 HYPERLIPIDEMIA, UNSPECIFIED: ICD-10-CM

## 2023-06-03 DIAGNOSIS — Z95.5 PRESENCE OF CORONARY ANGIOPLASTY IMPLANT AND GRAFT: Chronic | ICD-10-CM

## 2023-06-03 DIAGNOSIS — I10 ESSENTIAL (PRIMARY) HYPERTENSION: ICD-10-CM

## 2023-06-03 DIAGNOSIS — R94.31 ABNORMAL ELECTROCARDIOGRAM [ECG] [EKG]: ICD-10-CM

## 2023-06-03 DIAGNOSIS — N18.30 CHRONIC KIDNEY DISEASE, STAGE 3 UNSPECIFIED: ICD-10-CM

## 2023-06-03 DIAGNOSIS — Z29.9 ENCOUNTER FOR PROPHYLACTIC MEASURES, UNSPECIFIED: ICD-10-CM

## 2023-06-03 LAB
A1C WITH ESTIMATED AVERAGE GLUCOSE RESULT: 8.2 % — HIGH (ref 4–5.6)
ALBUMIN SERPL ELPH-MCNC: 3.7 G/DL — SIGNIFICANT CHANGE UP (ref 3.3–5)
ALP SERPL-CCNC: 51 U/L — SIGNIFICANT CHANGE UP (ref 40–120)
ALT FLD-CCNC: 20 U/L — SIGNIFICANT CHANGE UP (ref 10–45)
ANION GAP SERPL CALC-SCNC: 10 MMOL/L — SIGNIFICANT CHANGE UP (ref 5–17)
ANION GAP SERPL CALC-SCNC: 10 MMOL/L — SIGNIFICANT CHANGE UP (ref 5–17)
APTT BLD: 32.1 SEC — SIGNIFICANT CHANGE UP (ref 27.5–35.5)
APTT BLD: 54.5 SEC — HIGH (ref 27.5–35.5)
APTT BLD: 67.4 SEC — HIGH (ref 27.5–35.5)
APTT BLD: 71.7 SEC — HIGH (ref 27.5–35.5)
APTT BLD: 84 SEC — HIGH (ref 27.5–35.5)
AST SERPL-CCNC: 32 U/L — SIGNIFICANT CHANGE UP (ref 10–40)
BASOPHILS # BLD AUTO: 0.02 K/UL — SIGNIFICANT CHANGE UP (ref 0–0.2)
BASOPHILS NFR BLD AUTO: 0.4 % — SIGNIFICANT CHANGE UP (ref 0–2)
BILIRUB SERPL-MCNC: 0.4 MG/DL — SIGNIFICANT CHANGE UP (ref 0.2–1.2)
BUN SERPL-MCNC: 44 MG/DL — HIGH (ref 7–23)
BUN SERPL-MCNC: 50 MG/DL — HIGH (ref 7–23)
CALCIUM SERPL-MCNC: 8.6 MG/DL — SIGNIFICANT CHANGE UP (ref 8.4–10.5)
CALCIUM SERPL-MCNC: 8.7 MG/DL — SIGNIFICANT CHANGE UP (ref 8.4–10.5)
CHLORIDE SERPL-SCNC: 103 MMOL/L — SIGNIFICANT CHANGE UP (ref 96–108)
CHLORIDE SERPL-SCNC: 106 MMOL/L — SIGNIFICANT CHANGE UP (ref 96–108)
CHOLEST SERPL-MCNC: 140 MG/DL — SIGNIFICANT CHANGE UP
CK MB CFR SERPL CALC: 3 NG/ML — SIGNIFICANT CHANGE UP (ref 0–6.7)
CK MB CFR SERPL CALC: 3 NG/ML — SIGNIFICANT CHANGE UP (ref 0–6.7)
CK SERPL-CCNC: 198 U/L — SIGNIFICANT CHANGE UP (ref 30–200)
CK SERPL-CCNC: 209 U/L — HIGH (ref 30–200)
CO2 SERPL-SCNC: 22 MMOL/L — SIGNIFICANT CHANGE UP (ref 22–31)
CO2 SERPL-SCNC: 23 MMOL/L — SIGNIFICANT CHANGE UP (ref 22–31)
CREAT SERPL-MCNC: 1.53 MG/DL — HIGH (ref 0.5–1.3)
CREAT SERPL-MCNC: 1.56 MG/DL — HIGH (ref 0.5–1.3)
EGFR: 46 ML/MIN/1.73M2 — LOW
EGFR: 47 ML/MIN/1.73M2 — LOW
EOSINOPHIL # BLD AUTO: 0.02 K/UL — SIGNIFICANT CHANGE UP (ref 0–0.5)
EOSINOPHIL NFR BLD AUTO: 0.4 % — SIGNIFICANT CHANGE UP (ref 0–6)
ESTIMATED AVERAGE GLUCOSE: 189 MG/DL — HIGH (ref 68–114)
GLUCOSE BLDC GLUCOMTR-MCNC: 108 MG/DL — HIGH (ref 70–99)
GLUCOSE BLDC GLUCOMTR-MCNC: 124 MG/DL — HIGH (ref 70–99)
GLUCOSE BLDC GLUCOMTR-MCNC: 134 MG/DL — HIGH (ref 70–99)
GLUCOSE BLDC GLUCOMTR-MCNC: 159 MG/DL — HIGH (ref 70–99)
GLUCOSE SERPL-MCNC: 135 MG/DL — HIGH (ref 70–99)
GLUCOSE SERPL-MCNC: 155 MG/DL — HIGH (ref 70–99)
HCT VFR BLD CALC: 33.8 % — LOW (ref 39–50)
HCT VFR BLD CALC: 35.8 % — LOW (ref 39–50)
HDLC SERPL-MCNC: 44 MG/DL — SIGNIFICANT CHANGE UP
HGB BLD-MCNC: 11 G/DL — LOW (ref 13–17)
HGB BLD-MCNC: 11.7 G/DL — LOW (ref 13–17)
IMM GRANULOCYTES NFR BLD AUTO: 0.6 % — SIGNIFICANT CHANGE UP (ref 0–0.9)
INR BLD: 0.95 — SIGNIFICANT CHANGE UP (ref 0.88–1.16)
LIPID PNL WITH DIRECT LDL SERPL: 63 MG/DL — SIGNIFICANT CHANGE UP
LYMPHOCYTES # BLD AUTO: 0.55 K/UL — LOW (ref 1–3.3)
LYMPHOCYTES # BLD AUTO: 11.4 % — LOW (ref 13–44)
MAGNESIUM SERPL-MCNC: 2.4 MG/DL — SIGNIFICANT CHANGE UP (ref 1.6–2.6)
MCHC RBC-ENTMCNC: 31.2 PG — SIGNIFICANT CHANGE UP (ref 27–34)
MCHC RBC-ENTMCNC: 31.3 PG — SIGNIFICANT CHANGE UP (ref 27–34)
MCHC RBC-ENTMCNC: 32.5 GM/DL — SIGNIFICANT CHANGE UP (ref 32–36)
MCHC RBC-ENTMCNC: 32.7 GM/DL — SIGNIFICANT CHANGE UP (ref 32–36)
MCV RBC AUTO: 95.7 FL — SIGNIFICANT CHANGE UP (ref 80–100)
MCV RBC AUTO: 95.8 FL — SIGNIFICANT CHANGE UP (ref 80–100)
MONOCYTES # BLD AUTO: 0.34 K/UL — SIGNIFICANT CHANGE UP (ref 0–0.9)
MONOCYTES NFR BLD AUTO: 7.1 % — SIGNIFICANT CHANGE UP (ref 2–14)
NEUTROPHILS # BLD AUTO: 3.86 K/UL — SIGNIFICANT CHANGE UP (ref 1.8–7.4)
NEUTROPHILS NFR BLD AUTO: 80.1 % — HIGH (ref 43–77)
NON HDL CHOLESTEROL: 96 MG/DL — SIGNIFICANT CHANGE UP
NRBC # BLD: 0 /100 WBCS — SIGNIFICANT CHANGE UP (ref 0–0)
NRBC # BLD: 0 /100 WBCS — SIGNIFICANT CHANGE UP (ref 0–0)
PHOSPHATE SERPL-MCNC: 3.4 MG/DL — SIGNIFICANT CHANGE UP (ref 2.5–4.5)
PLATELET # BLD AUTO: 131 K/UL — LOW (ref 150–400)
PLATELET # BLD AUTO: 147 K/UL — LOW (ref 150–400)
POTASSIUM SERPL-MCNC: 3.9 MMOL/L — SIGNIFICANT CHANGE UP (ref 3.5–5.3)
POTASSIUM SERPL-MCNC: SIGNIFICANT CHANGE UP (ref 3.5–5.3)
POTASSIUM SERPL-SCNC: 3.9 MMOL/L — SIGNIFICANT CHANGE UP (ref 3.5–5.3)
POTASSIUM SERPL-SCNC: SIGNIFICANT CHANGE UP (ref 3.5–5.3)
PROT SERPL-MCNC: 6.3 G/DL — SIGNIFICANT CHANGE UP (ref 6–8.3)
PROTHROM AB SERPL-ACNC: 11.3 SEC — SIGNIFICANT CHANGE UP (ref 10.5–13.4)
RBC # BLD: 3.53 M/UL — LOW (ref 4.2–5.8)
RBC # BLD: 3.74 M/UL — LOW (ref 4.2–5.8)
RBC # FLD: 14.8 % — HIGH (ref 10.3–14.5)
RBC # FLD: 14.9 % — HIGH (ref 10.3–14.5)
SARS-COV-2 RNA SPEC QL NAA+PROBE: SIGNIFICANT CHANGE UP
SODIUM SERPL-SCNC: 135 MMOL/L — SIGNIFICANT CHANGE UP (ref 135–145)
SODIUM SERPL-SCNC: 139 MMOL/L — SIGNIFICANT CHANGE UP (ref 135–145)
TRIGL SERPL-MCNC: 163 MG/DL — HIGH
TROPONIN T SERPL-MCNC: 0.06 NG/ML — CRITICAL HIGH (ref 0–0.01)
TROPONIN T SERPL-MCNC: 0.06 NG/ML — CRITICAL HIGH (ref 0–0.01)
WBC # BLD: 4.82 K/UL — SIGNIFICANT CHANGE UP (ref 3.8–10.5)
WBC # BLD: 5.54 K/UL — SIGNIFICANT CHANGE UP (ref 3.8–10.5)
WBC # FLD AUTO: 4.82 K/UL — SIGNIFICANT CHANGE UP (ref 3.8–10.5)
WBC # FLD AUTO: 5.54 K/UL — SIGNIFICANT CHANGE UP (ref 3.8–10.5)

## 2023-06-03 PROCEDURE — 93308 TTE F-UP OR LMTD: CPT | Mod: 26,59

## 2023-06-03 PROCEDURE — 99223 1ST HOSP IP/OBS HIGH 75: CPT

## 2023-06-03 PROCEDURE — 93306 TTE W/DOPPLER COMPLETE: CPT | Mod: 26

## 2023-06-03 RX ORDER — SODIUM CHLORIDE 9 MG/ML
1000 INJECTION, SOLUTION INTRAVENOUS
Refills: 0 | Status: DISCONTINUED | OUTPATIENT
Start: 2023-06-03 | End: 2023-06-07

## 2023-06-03 RX ORDER — HEPARIN SODIUM 5000 [USP'U]/ML
900 INJECTION INTRAVENOUS; SUBCUTANEOUS
Qty: 25000 | Refills: 0 | Status: DISCONTINUED | OUTPATIENT
Start: 2023-06-03 | End: 2023-06-04

## 2023-06-03 RX ORDER — ASCORBIC ACID 60 MG
500 TABLET,CHEWABLE ORAL DAILY
Refills: 0 | Status: DISCONTINUED | OUTPATIENT
Start: 2023-06-03 | End: 2023-06-07

## 2023-06-03 RX ORDER — HEPARIN SODIUM 5000 [USP'U]/ML
INJECTION INTRAVENOUS; SUBCUTANEOUS
Qty: 25000 | Refills: 0 | Status: DISCONTINUED | OUTPATIENT
Start: 2023-06-03 | End: 2023-06-03

## 2023-06-03 RX ORDER — HEPARIN SODIUM 5000 [USP'U]/ML
6000 INJECTION INTRAVENOUS; SUBCUTANEOUS EVERY 6 HOURS
Refills: 0 | Status: DISCONTINUED | OUTPATIENT
Start: 2023-06-03 | End: 2023-06-04

## 2023-06-03 RX ORDER — SIMVASTATIN 20 MG/1
1 TABLET, FILM COATED ORAL
Qty: 0 | Refills: 0 | DISCHARGE

## 2023-06-03 RX ORDER — CLOPIDOGREL BISULFATE 75 MG/1
600 TABLET, FILM COATED ORAL ONCE
Refills: 0 | Status: COMPLETED | OUTPATIENT
Start: 2023-06-03 | End: 2023-06-03

## 2023-06-03 RX ORDER — DEXTROSE 50 % IN WATER 50 %
12.5 SYRINGE (ML) INTRAVENOUS ONCE
Refills: 0 | Status: DISCONTINUED | OUTPATIENT
Start: 2023-06-03 | End: 2023-06-07

## 2023-06-03 RX ORDER — ASPIRIN/CALCIUM CARB/MAGNESIUM 324 MG
81 TABLET ORAL DAILY
Refills: 0 | Status: DISCONTINUED | OUTPATIENT
Start: 2023-06-03 | End: 2023-06-07

## 2023-06-03 RX ORDER — AMLODIPINE BESYLATE 2.5 MG/1
5 TABLET ORAL ONCE
Refills: 0 | Status: COMPLETED | OUTPATIENT
Start: 2023-06-03 | End: 2023-06-03

## 2023-06-03 RX ORDER — HEPARIN SODIUM 5000 [USP'U]/ML
5000 INJECTION INTRAVENOUS; SUBCUTANEOUS ONCE
Refills: 0 | Status: COMPLETED | OUTPATIENT
Start: 2023-06-03 | End: 2023-06-03

## 2023-06-03 RX ORDER — ATORVASTATIN CALCIUM 80 MG/1
40 TABLET, FILM COATED ORAL AT BEDTIME
Refills: 0 | Status: DISCONTINUED | OUTPATIENT
Start: 2023-06-03 | End: 2023-06-07

## 2023-06-03 RX ORDER — NITROGLYCERIN 6.5 MG
0.5 CAPSULE, EXTENDED RELEASE ORAL ONCE
Refills: 0 | Status: COMPLETED | OUTPATIENT
Start: 2023-06-03 | End: 2023-06-03

## 2023-06-03 RX ORDER — INSULIN LISPRO 100/ML
VIAL (ML) SUBCUTANEOUS
Refills: 0 | Status: DISCONTINUED | OUTPATIENT
Start: 2023-06-03 | End: 2023-06-07

## 2023-06-03 RX ORDER — GLUCAGON INJECTION, SOLUTION 0.5 MG/.1ML
1 INJECTION, SOLUTION SUBCUTANEOUS ONCE
Refills: 0 | Status: DISCONTINUED | OUTPATIENT
Start: 2023-06-03 | End: 2023-06-07

## 2023-06-03 RX ORDER — CLOPIDOGREL BISULFATE 75 MG/1
300 TABLET, FILM COATED ORAL ONCE
Refills: 0 | Status: DISCONTINUED | OUTPATIENT
Start: 2023-06-03 | End: 2023-06-03

## 2023-06-03 RX ORDER — LOSARTAN POTASSIUM 100 MG/1
25 TABLET, FILM COATED ORAL EVERY 12 HOURS
Refills: 0 | Status: DISCONTINUED | OUTPATIENT
Start: 2023-06-03 | End: 2023-06-04

## 2023-06-03 RX ORDER — DEXTROSE 50 % IN WATER 50 %
25 SYRINGE (ML) INTRAVENOUS ONCE
Refills: 0 | Status: DISCONTINUED | OUTPATIENT
Start: 2023-06-03 | End: 2023-06-07

## 2023-06-03 RX ORDER — CHOLECALCIFEROL (VITAMIN D3) 125 MCG
2000 CAPSULE ORAL DAILY
Refills: 0 | Status: DISCONTINUED | OUTPATIENT
Start: 2023-06-03 | End: 2023-06-07

## 2023-06-03 RX ORDER — CLOPIDOGREL BISULFATE 75 MG/1
75 TABLET, FILM COATED ORAL DAILY
Refills: 0 | Status: DISCONTINUED | OUTPATIENT
Start: 2023-06-04 | End: 2023-06-07

## 2023-06-03 RX ORDER — DEXTROSE 50 % IN WATER 50 %
15 SYRINGE (ML) INTRAVENOUS ONCE
Refills: 0 | Status: DISCONTINUED | OUTPATIENT
Start: 2023-06-03 | End: 2023-06-07

## 2023-06-03 RX ADMIN — Medication 81 MILLIGRAM(S): at 11:39

## 2023-06-03 RX ADMIN — HEPARIN SODIUM 1000 UNIT(S)/HR: 5000 INJECTION INTRAVENOUS; SUBCUTANEOUS at 04:27

## 2023-06-03 RX ADMIN — LOSARTAN POTASSIUM 25 MILLIGRAM(S): 100 TABLET, FILM COATED ORAL at 11:39

## 2023-06-03 RX ADMIN — HEPARIN SODIUM 900 UNIT(S)/HR: 5000 INJECTION INTRAVENOUS; SUBCUTANEOUS at 18:47

## 2023-06-03 RX ADMIN — Medication 2000 UNIT(S): at 11:39

## 2023-06-03 RX ADMIN — Medication 0.5 INCH(S): at 15:13

## 2023-06-03 RX ADMIN — AMLODIPINE BESYLATE 5 MILLIGRAM(S): 2.5 TABLET ORAL at 22:28

## 2023-06-03 RX ADMIN — ATORVASTATIN CALCIUM 40 MILLIGRAM(S): 80 TABLET, FILM COATED ORAL at 22:29

## 2023-06-03 RX ADMIN — Medication 0.5 INCH(S): at 01:10

## 2023-06-03 RX ADMIN — HEPARIN SODIUM 900 UNIT(S)/HR: 5000 INJECTION INTRAVENOUS; SUBCUTANEOUS at 12:09

## 2023-06-03 RX ADMIN — LOSARTAN POTASSIUM 25 MILLIGRAM(S): 100 TABLET, FILM COATED ORAL at 17:47

## 2023-06-03 RX ADMIN — Medication 500 MILLIGRAM(S): at 11:39

## 2023-06-03 RX ADMIN — HEPARIN SODIUM 5000 UNIT(S): 5000 INJECTION INTRAVENOUS; SUBCUTANEOUS at 04:27

## 2023-06-03 RX ADMIN — Medication 2: at 11:56

## 2023-06-03 RX ADMIN — CLOPIDOGREL BISULFATE 600 MILLIGRAM(S): 75 TABLET, FILM COATED ORAL at 11:39

## 2023-06-03 NOTE — PROGRESS NOTE ADULT - SUBJECTIVE AND OBJECTIVE BOX
O/N Events:    Subjective/ROS: Patient seen and examined at bedside.     Denies Fever/Chills, HA, CP, SOB, n/v, changes in bowel/urinary habits.  12pt ROS otherwise negative.    VITALS  Vital Signs Last 24 Hrs  T(C): 36.8 (03 Jun 2023 05:20), Max: 36.9 (02 Jun 2023 22:38)  T(F): 98.3 (03 Jun 2023 05:20), Max: 98.4 (02 Jun 2023 22:38)  HR: 69 (03 Jun 2023 09:11) (69 - 80)  BP: 146/65 (03 Jun 2023 09:11) (146/65 - 181/81)  BP(mean): 93 (03 Jun 2023 09:11) (93 - 110)  RR: 18 (03 Jun 2023 09:11) (16 - 20)  SpO2: 97% (03 Jun 2023 09:11) (95% - 98%)    Parameters below as of 03 Jun 2023 09:11  Patient On (Oxygen Delivery Method): room air        CAPILLARY BLOOD GLUCOSE      POCT Blood Glucose.: 124 mg/dL (03 Jun 2023 06:35)      PHYSICAL EXAM  General: NAD  Head: NC/AT; MMM; PERRL; EOMI;  Neck: Supple; no JVD  Respiratory: CTAB; no wheezes/rales/rhonchi  Cardiovascular: Regular rhythm/rate; S1/S2+, no murmurs, rubs gallops   Gastrointestinal: Soft; NTND; bowel sounds normal and present  Extremities: WWP; no edema/cyanosis  Neurological: A&Ox3, CNII-XII grossly intact; no obvious focal deficits    MEDICATIONS  (STANDING):  ascorbic acid 500 milliGRAM(s) Oral daily  aspirin enteric coated 81 milliGRAM(s) Oral daily  atorvastatin 40 milliGRAM(s) Oral at bedtime  cholecalciferol 2000 Unit(s) Oral daily  clopidogrel Tablet 600 milliGRAM(s) Oral once  dextrose 5%. 1000 milliLiter(s) (50 mL/Hr) IV Continuous <Continuous>  dextrose 5%. 1000 milliLiter(s) (100 mL/Hr) IV Continuous <Continuous>  dextrose 50% Injectable 12.5 Gram(s) IV Push once  dextrose 50% Injectable 25 Gram(s) IV Push once  dextrose 50% Injectable 25 Gram(s) IV Push once  glucagon  Injectable 1 milliGRAM(s) IntraMuscular once  heparin  Infusion.  Unit(s)/Hr (10 mL/Hr) IV Continuous <Continuous>  insulin lispro (ADMELOG) corrective regimen sliding scale   SubCutaneous Before meals and at bedtime  losartan 25 milliGRAM(s) Oral every 12 hours    MEDICATIONS  (PRN):  dextrose Oral Gel 15 Gram(s) Oral once PRN Blood Glucose LESS THAN 70 milliGRAM(s)/deciliter  heparin   Injectable 6000 Unit(s) IV Push every 6 hours PRN For aPTT less than 40      No Known Allergies      LABS                        11.0   4.82  )-----------( 147      ( 03 Jun 2023 02:30 )             33.8     06-03    139  |  106  |  44<H>  ----------------------------<  135<H>  3.9   |  23  |  1.53<H>    Ca    8.7      03 Jun 2023 07:55  Phos  3.4     06-03  Mg     2.4     06-03    TPro  6.3  /  Alb  3.7  /  TBili  0.4  /  DBili  x   /  AST  32  /  ALT  20  /  AlkPhos  51  06-03    PT/INR - ( 03 Jun 2023 00:53 )   PT: 11.3 sec;   INR: 0.95          PTT - ( 03 Jun 2023 00:53 )  PTT:32.1 sec    CARDIAC MARKERS ( 03 Jun 2023 02:25 )  x     / 0.06 ng/mL / 209 U/L / x     / 3.0 ng/mL  CARDIAC MARKERS ( 02 Jun 2023 23:03 )  x     / 0.05 ng/mL / 231 U/L / x     / 3.0 ng/mL          IMAGING/EKG/ETC   O/N Events: Admitted overnight for unstable angina and NSTEMI seen on EKG    Subjective/ROS: Patient seen and examined at bedside. Pt without any active chest pain.     Denies Fever/Chills, HA, CP, SOB, n/v, changes in bowel/urinary habits.  12pt ROS otherwise negative.    VITALS  Vital Signs Last 24 Hrs  T(C): 36.8 (03 Jun 2023 05:20), Max: 36.9 (02 Jun 2023 22:38)  T(F): 98.3 (03 Jun 2023 05:20), Max: 98.4 (02 Jun 2023 22:38)  HR: 69 (03 Jun 2023 09:11) (69 - 80)  BP: 146/65 (03 Jun 2023 09:11) (146/65 - 181/81)  BP(mean): 93 (03 Jun 2023 09:11) (93 - 110)  RR: 18 (03 Jun 2023 09:11) (16 - 20)  SpO2: 97% (03 Jun 2023 09:11) (95% - 98%)    Parameters below as of 03 Jun 2023 09:11  Patient On (Oxygen Delivery Method): room air        CAPILLARY BLOOD GLUCOSE      POCT Blood Glucose.: 124 mg/dL (03 Jun 2023 06:35)      PHYSICAL EXAM  General: NAD, obese  Head: NC/AT; MMM; PERRL; EOMI  Neck: Supple; no JVD  Respiratory: CTAB; no wheezes/rales/rhonchi  Cardiovascular: Regular rhythm/rate; S1/S2+, no murmurs, rubs gallops   Gastrointestinal: obese, soft; NTND; bowel sounds normal and present  Extremities: WWP  Neurological: A&Ox3; no obvious focal deficits    MEDICATIONS  (STANDING):  ascorbic acid 500 milliGRAM(s) Oral daily  aspirin enteric coated 81 milliGRAM(s) Oral daily  atorvastatin 40 milliGRAM(s) Oral at bedtime  cholecalciferol 2000 Unit(s) Oral daily  clopidogrel Tablet 600 milliGRAM(s) Oral once  dextrose 5%. 1000 milliLiter(s) (50 mL/Hr) IV Continuous <Continuous>  dextrose 5%. 1000 milliLiter(s) (100 mL/Hr) IV Continuous <Continuous>  dextrose 50% Injectable 12.5 Gram(s) IV Push once  dextrose 50% Injectable 25 Gram(s) IV Push once  dextrose 50% Injectable 25 Gram(s) IV Push once  glucagon  Injectable 1 milliGRAM(s) IntraMuscular once  heparin  Infusion.  Unit(s)/Hr (10 mL/Hr) IV Continuous <Continuous>  insulin lispro (ADMELOG) corrective regimen sliding scale   SubCutaneous Before meals and at bedtime  losartan 25 milliGRAM(s) Oral every 12 hours    MEDICATIONS  (PRN):  dextrose Oral Gel 15 Gram(s) Oral once PRN Blood Glucose LESS THAN 70 milliGRAM(s)/deciliter  heparin   Injectable 6000 Unit(s) IV Push every 6 hours PRN For aPTT less than 40      No Known Allergies      LABS                        11.0   4.82  )-----------( 147      ( 03 Jun 2023 02:30 )             33.8     06-03    139  |  106  |  44<H>  ----------------------------<  135<H>  3.9   |  23  |  1.53<H>    Ca    8.7      03 Jun 2023 07:55  Phos  3.4     06-03  Mg     2.4     06-03    TPro  6.3  /  Alb  3.7  /  TBili  0.4  /  DBili  x   /  AST  32  /  ALT  20  /  AlkPhos  51  06-03    PT/INR - ( 03 Jun 2023 00:53 )   PT: 11.3 sec;   INR: 0.95          PTT - ( 03 Jun 2023 00:53 )  PTT:32.1 sec    CARDIAC MARKERS ( 03 Jun 2023 02:25 )  x     / 0.06 ng/mL / 209 U/L / x     / 3.0 ng/mL  CARDIAC MARKERS ( 02 Jun 2023 23:03 )  x     / 0.05 ng/mL / 231 U/L / x     / 3.0 ng/mL          IMAGING/EKG/ETC

## 2023-06-03 NOTE — PATIENT PROFILE ADULT - FALL HARM RISK - HARM RISK INTERVENTIONS
Communicate Risk of Fall with Harm to all staff/Monitor for mental status changes/Monitor gait and stability/Reinforce activity limits and safety measures with patient and family/Tailored Fall Risk Interventions/Use of alarms - bed, chair and/or voice tab/Visual Cue: Yellow wristband and red socks/Bed in lowest position, wheels locked, appropriate side rails in place/Call bell, personal items and telephone in reach/Instruct patient to call for assistance before getting out of bed or chair/Non-slip footwear when patient is out of bed/Mode to call system/Physically safe environment - no spills, clutter or unnecessary equipment/Purposeful Proactive Rounding/Room/bathroom lighting operational, light cord in reach

## 2023-06-03 NOTE — H&P ADULT - ASSESSMENT
73 obese M with PMHx HTN, Hyperlipidemia, DM Type II, CAD s/p multiple PCIs (05/24/2005 PCI to mLAD x2, pLAD and 04/18/2005 PCI to mid/prox/distal RCA, OM2), and CABG x 2 in 2017 (LIMA-LAD reverse SVG- PDA), CKD Stage III (Baseline Cr 1.4), previous Covid infection, Chronic Diastolic CHF (EF 60% by echo 2020-admitted for exacerbation) who presented to [facility] ER c/o worsening C/P x 1 day found to have 1 mm ST elevations in inferior leads with reciprocal changes in lateral leads. STEMI code called however patient remained chest pain free in the ER and bedside echo revealed no wall motion abnormalities. Patient is now admitted for further management of NSTEMI.

## 2023-06-03 NOTE — H&P ADULT - PROBLEM SELECTOR PLAN 5
-Baseline Cr 1.4. Cr on admission 1.6.   -Avoid Nephrotoxic Agents and Renally dose medications. Holding ACE and Lasix due to slight increase in Cr above baseline and probable need for a cardiac cath.   -Monitor renal function, electrolytes, BP, urine output and volume status.    -Daily Phosphorous

## 2023-06-03 NOTE — H&P ADULT - HISTORY OF PRESENT ILLNESS
73 obese M with PMHx HTN, Hyperlipidemia, DM Type II, CAD s/p multiple PCIs (05/24/2005 PCI to mLAD x2, pLAD and 04/18/2005 PCI to mid/prox/distal RCA, OM2), and CABG x 2 in 2017 (LIMA-LAD reverse SVG- PDA), CKD Stage III (Baseline Cr 1.4), previous Covid infection, Chronic Diastolic CHF (EF 60% by echo 2020-admitted for exacerbation) who presented to [facility] ER c/o worsening C/P x 1 day. Patient has been experiencing exertional burning substernal non-radiating C/P x 1 week. Family noted patient to be SOB and fatigued and didn’t look well. He denies palpitations, dizziness, diaphoresis, N/V, syncope, PND, orthopnea, but admits to BLE edema. VS on arrival /81 HR 77 RR 20 Temp 98.4F O2 sat 97% RA.      In ER STEMI code called as EKG revealed NSR at 75 bpm with 1 mm ST elevation inferior leads (II, AVF) with ST depressions I, AVL (new compared to EKG 2020) and 1st degree AV block with significantly prolonged  ms (MT interval 310 on EKG 2020). Dr. Ca reviewed EKGs and recommended stat echo given absence of pain in ER which revealed no wall motion abnormalities. Troponin T 0.05, CKMB 231 and CKMB 3.0. Labs significant for Hgb/HCT 11.4/35.2, Neutrophils 82.1%, BUN 53, Cr 1.61, Glucose 213. Treatment in the ER: Nitro Paste 0.5 inch x 1 given. EMS administered ASA 81 mg x 4 prior to arrival. Patient is now admitted for further management of NSTEMI.   73 obese M with PMHx HTN, Hyperlipidemia, DM Type II, CAD s/p multiple PCIs (05/24/2005 PCI to mLAD x2, pLAD and 04/18/2005 PCI to mid/prox/distal RCA, OM2), and CABG x 2 in 2017 (LIMA-LAD reverse SVG- PDA), CKD Stage III (Baseline Cr 1.4), previous Covid infection, Chronic Diastolic CHF (EF 60% by echo 2020-admitted for exacerbation) who presented to [facility] ER c/o worsening C/P x 1 day. Patient has been experiencing exertional burning substernal non-radiating C/P x 1 week. Family noted patient to be SOB and fatigued and didn’t look well. He denies palpitations, dizziness, diaphoresis, N/V, syncope, PND, orthopnea, but admits to BLE edema. VS on arrival /81 HR 77 RR 20 Temp 98.4F O2 sat 97% RA.      In ER STEMI code called as EKG revealed NSR at 75 bpm with 1 mm ST elevation inferior leads (III, AVF) with ST depressions I, AVL (new compared to EKG 2020) and 1st degree AV block with significantly prolonged  ms (W[place] interval 310 on EKG 2020). Dr. Ca reviewed EKGs and recommended stat echo given absence of pain in ER which revealed no wall motion abnormalities. Troponin T 0.05, CKMB 231 and CKMB 3.0. Labs significant for Hgb/HCT 11.4/35.2, Neutrophils 82.1%, BUN 53, Cr 1.61, Glucose 213. Treatment in the ER: Nitro Paste 0.5 inch x 1 given. EMS administered ASA 81 mg x 4 prior to arrival. Patient is now admitted for further management of NSTEMI.   73 obese M with PMHx HTN, Hyperlipidemia, DM Type II, CAD s/p multiple PCIs (05/24/2005 PCI to mLAD x2, pLAD and 04/18/2005 PCI to mid/prox/distal RCA, OM2), and CABG x 2 in 2017 (LIMA-LAD reverse SVG- PDA), CKD Stage III (Baseline Cr 1.4), previous Covid infection, Chronic Diastolic CHF (EF 60% by echo 2020-admitted for exacerbation) who presented to [facility] ER c/o worsening C/P x 1 day. Patient has been experiencing exertional burning substernal non-radiating C/P x 1 week. Family noted patient to be SOB and fatigued and didn’t look well. He denies palpitations, dizziness, diaphoresis, N/V, syncope, PND, orthopnea, but admits to BLE edema. VS on arrival /81 HR 77 RR 20 Temp 98.4F O2 sat 97% RA.      In ER STEMI code called as EKG revealed NSR at 75 bpm with 1 mm ST elevation inferior leads (III, AVF) with 1 mm ST depressions I, AVL (new compared to EKG 2020) and 1st degree AV block with significantly prolonged  ms (CA interval 310 on EKG 2020). Dr. Ca reviewed EKGs and recommended stat echo given absence of pain in ER which revealed no wall motion abnormalities. Troponin T 0.05, CKMB 231 and CKMB 3.0. Labs significant for Hgb/HCT 11.4/35.2, Neutrophils 82.1%, BUN 53, Cr 1.61, Glucose 213. Treatment in the ER: Nitro Paste 0.5 inch x 1 given. EMS administered ASA 81 mg x 4 prior to arrival. Patient is now admitted for further management of NSTEMI.   History obtained from prior admissions and wife Emmett at bedside (reliable)   73 obese M with PMHx HTN, Hyperlipidemia, DM Type II, CAD s/p multiple PCIs (05/24/2005 PCI to mLAD x2, pLAD and 04/18/2005 PCI to mid/prox/distal RCA, OM2), and CABG x 2 in 2017 (LIMA-LAD reverse SVG- PDA), CKD Stage III (Baseline Cr 1.4), previous Covid infection, Chronic Diastolic CHF (EF 60% by echo 2020-admitted for exacerbation) who presented to [facility] ER c/o worsening C/P x 1 day. Patient has been experiencing exertional burning substernal non-radiating C/P x 1 week. Family noted patient to be SOB and fatigued and didn’t look well. He denies palpitations, dizziness, diaphoresis, N/V, syncope, PND, orthopnea, but admits to BLE edema. VS on arrival /81 HR 77 RR 20 Temp 98.4F O2 sat 97% RA.      In ER STEMI code called as EKG revealed NSR at 75 bpm with 1 mm ST elevation inferior leads (III, AVF) with 1 mm ST depressions I, AVL (new compared to EKG 2020) and 1st degree AV block with significantly prolonged  ms (ND interval 310 on EKG 2020). Dr. Ca reviewed EKGs and recommended stat echo given absence of pain in ER which revealed no wall motion abnormalities. Troponin T 0.05, CKMB 231 and CKMB 3.0. Labs significant for Hgb/HCT 11.4/35.2, Neutrophils 82.1%, BUN 53, Cr 1.61, Glucose 213. Treatment in the ER: Nitro Paste 0.5 inch x 1 given. EMS administered ASA 81 mg x 4 prior to arrival. Patient is now admitted for further management of NSTEMI.      Medications obtained from [employer] and confirmed with wife at bedside History obtained from prior admissions and wife Emmett at bedside (reliable)     73 obese M with PMHx HTN, Hyperlipidemia, DM Type II, CAD s/p multiple PCIs (05/24/2005 PCI to mLAD x2, pLAD and 04/18/2005 PCI to mid/prox/distal RCA, OM2), and CABG x 2 in 2017 (LIMA-LAD reverse SVG- PDA), CKD Stage III (Baseline Cr 1.4), previous Covid infection, Chronic Diastolic CHF (EF 60% by echo 2020-admitted for exacerbation) who presented to [facility] ER c/o worsening C/P x 1 day. Patient has been experiencing exertional burning substernal non-radiating C/P x 1 week. Family noted patient to be SOB and fatigued and didn’t look well. He denies palpitations, dizziness, diaphoresis, N/V, syncope, PND, orthopnea, but admits to BLE edema. VS on arrival /81 HR 77 RR 20 Temp 98.4F O2 sat 97% RA.      In ER STEMI code called as EKG revealed NSR at 75 bpm with 1 mm ST elevation inferior leads (III, AVF) with 1 mm ST depressions I, AVL (new compared to EKG 2020) and 1st degree AV block with significantly prolonged  ms (DC interval 310 on EKG 2020). Dr. Ca reviewed EKGs and recommended stat echo given absence of pain in ER which revealed no wall motion abnormalities. Troponin T 0.05, CKMB 231 and CKMB 3.0. Labs significant for Hgb/HCT 11.4/35.2, Neutrophils 82.1%, BUN 53, Cr 1.61, Glucose 213. Treatment in the ER: Nitro Paste 0.5 inch x 1 given. EMS administered ASA 81 mg x 4 prior to arrival. Patient is now admitted for further management of NSTEMI.      Medications obtained from [employer] and confirmed with wife at bedside

## 2023-06-03 NOTE — PROGRESS NOTE ADULT - PROBLEM SELECTOR PLAN 2
-SBP 150s.   - C/w losartan 25mg BID  - If patient's Cr worsens, consider switching to amlodipine.  - Holding home metoprolol given prolonged CA interval.

## 2023-06-03 NOTE — PROGRESS NOTE ADULT - PROBLEM SELECTOR PLAN 1
Patient currently C/P free and HD stable . Hx of CAD with multiple PCIs with subsequent 2V CABG 2017 LIMA-LAD reverse SVG- PDA), presenting with exertional C/P x 1 week worsening x 1 day   -Initial EKG NSR at 75 bpm with 1 mm ST elevation inferior leads (III, AVF) with 1 mm ST depressions I, AVL (new compared to EKG 2020). STEMI code called by ER. Interventionalist reviewed EKG. Stat bedside echo revealed no wall motion abnormalities. Code STEMI cancelled.  - C/w heparin gtt  - Plavix loaded with 600mg, c/w 75mg daily  - C/w aspirin 81mg daily  - C/w lipitor 40mg

## 2023-06-03 NOTE — H&P ADULT - PROBLEM SELECTOR PLAN 1
Patient currently C/P free and HD stable . Hx of CAD with multiple PCIs with subsequent 2V CABG 2017 LIMA-LAD reverse SVG- PDA), presenting with exertional C/P x 1 week worsening x 1 day   -Initial EKG NSR at 75 bpm with 1 mm ST elevation inferior leads (III, AVF) with 1 mm ST depressions I, AVL (new compared to EKG 2020). STEMI code called by ER. Interventionalist reviewed EKG. Stat bedside echo revealed no wall motion abnormalities and patient remained chest pain free. Repeat EKG unchanged. Continue serial EKGs   -Initial Troponin T 0.05, CKMB normal, . 2nd set Troponin uptrended to 0.06. CKMB remains normal, CK trended down to 209. Trend CE to peak. Follow-up CE 7 AM   -Heparin drip with bolus initiated. Continue Aspirin. Vytorin therapeutic interchange to Lipitor    -Follow-up official echocardiogram.

## 2023-06-03 NOTE — H&P ADULT - PROBLEM SELECTOR PLAN 2
-SBP 150s. Holding ACE and Lasix due to slight increase in Cr above baseline and probable need for a cardiac cath. Holding Metoprolol due to prolonged WY interval.    -Continue to monitor BP

## 2023-06-03 NOTE — H&P ADULT - PROBLEM SELECTOR PLAN 6
AL interval on admission . 2020 EKG revealed AL interval 310. No signs of blocks or bradycardia.    -Holding Metoprolol   -Continue to monitor telemetry and serial EKGs.     FULL CODE  DVT Prophylaxis: Heparin IV  Dispo: Pending clinical progression ME interval on admission . 2020 EKG revealed ME interval 310. No signs of blocks or bradycardia.    -Holding Metoprolol   -Continue to monitor telemetry and serial EKGs.   -Consider EP consult    FULL CODE  DVT Prophylaxis: Heparin IV  Dispo: Pending clinical progression

## 2023-06-03 NOTE — PROGRESS NOTE ADULT - PROBLEM SELECTOR PLAN 7
F: None  E: replete as needed  N: Dash diet    FULL CODE  DVT Prophylaxis: Heparin gtt  Dispo: Cardiac tele

## 2023-06-03 NOTE — H&P ADULT - NSHPLABSRESULTS_GEN_ALL_CORE
11.0   4.82  )-----------( 147      ( 03 Jun 2023 02:30 )             33.8       06-03    135  |  103  |  50<H>  ----------------------------<  155<H>  See Note   |  22  |  1.56<H>    Ca    8.6      03 Jun 2023 02:30  Phos  3.4     06-03  Mg     2.4     06-03    TPro  6.3  /  Alb  3.7  /  TBili  0.4  /  DBili  x   /  AST  32  /  ALT  20  /  AlkPhos  51  06-03      PT/INR - ( 03 Jun 2023 00:53 )   PT: 11.3 sec;   INR: 0.95          PTT - ( 03 Jun 2023 00:53 )  PTT:32.1 sec    CARDIAC MARKERS ( 03 Jun 2023 02:25 )  x     / 0.06 ng/mL / 209 U/L / x     / 3.0 ng/mL  CARDIAC MARKERS ( 02 Jun 2023 23:03 )  x     / 0.05 ng/mL / 231 U/L / x     / 3.0 ng/mL            EKG:

## 2023-06-04 LAB
ANION GAP SERPL CALC-SCNC: 8 MMOL/L — SIGNIFICANT CHANGE UP (ref 5–17)
APTT BLD: 42.2 SEC — HIGH (ref 27.5–35.5)
APTT BLD: 60.2 SEC — HIGH (ref 27.5–35.5)
BLD GP AB SCN SERPL QL: NEGATIVE — SIGNIFICANT CHANGE UP
BUN SERPL-MCNC: 29 MG/DL — HIGH (ref 7–23)
CALCIUM SERPL-MCNC: 8.3 MG/DL — LOW (ref 8.4–10.5)
CHLORIDE SERPL-SCNC: 103 MMOL/L — SIGNIFICANT CHANGE UP (ref 96–108)
CO2 SERPL-SCNC: 25 MMOL/L — SIGNIFICANT CHANGE UP (ref 22–31)
CREAT SERPL-MCNC: 1.31 MG/DL — HIGH (ref 0.5–1.3)
EGFR: 57 ML/MIN/1.73M2 — LOW
GLUCOSE BLDC GLUCOMTR-MCNC: 121 MG/DL — HIGH (ref 70–99)
GLUCOSE BLDC GLUCOMTR-MCNC: 156 MG/DL — HIGH (ref 70–99)
GLUCOSE BLDC GLUCOMTR-MCNC: 159 MG/DL — HIGH (ref 70–99)
GLUCOSE BLDC GLUCOMTR-MCNC: 168 MG/DL — HIGH (ref 70–99)
GLUCOSE SERPL-MCNC: 156 MG/DL — HIGH (ref 70–99)
HCT VFR BLD CALC: 36.6 % — LOW (ref 39–50)
HGB BLD-MCNC: 12 G/DL — LOW (ref 13–17)
INR BLD: 0.98 — SIGNIFICANT CHANGE UP (ref 0.88–1.16)
ISTAT ACTK (ACTIVATED CLOTTING TIME KAOLIN): 191 SEC — HIGH (ref 74–137)
ISTAT ACTK (ACTIVATED CLOTTING TIME KAOLIN): 203 SEC — HIGH (ref 74–137)
MCHC RBC-ENTMCNC: 31.5 PG — SIGNIFICANT CHANGE UP (ref 27–34)
MCHC RBC-ENTMCNC: 32.8 GM/DL — SIGNIFICANT CHANGE UP (ref 32–36)
MCV RBC AUTO: 96.1 FL — SIGNIFICANT CHANGE UP (ref 80–100)
NRBC # BLD: 0 /100 WBCS — SIGNIFICANT CHANGE UP (ref 0–0)
PLATELET # BLD AUTO: 145 K/UL — LOW (ref 150–400)
POTASSIUM SERPL-MCNC: 4.1 MMOL/L — SIGNIFICANT CHANGE UP (ref 3.5–5.3)
POTASSIUM SERPL-SCNC: 4.1 MMOL/L — SIGNIFICANT CHANGE UP (ref 3.5–5.3)
PROTHROM AB SERPL-ACNC: 11.7 SEC — SIGNIFICANT CHANGE UP (ref 10.5–13.4)
RBC # BLD: 3.81 M/UL — LOW (ref 4.2–5.8)
RBC # FLD: 14.8 % — HIGH (ref 10.3–14.5)
RH IG SCN BLD-IMP: POSITIVE — SIGNIFICANT CHANGE UP
SODIUM SERPL-SCNC: 136 MMOL/L — SIGNIFICANT CHANGE UP (ref 135–145)
WBC # BLD: 6.57 K/UL — SIGNIFICANT CHANGE UP (ref 3.8–10.5)
WBC # FLD AUTO: 6.57 K/UL — SIGNIFICANT CHANGE UP (ref 3.8–10.5)

## 2023-06-04 PROCEDURE — 99233 SBSQ HOSP IP/OBS HIGH 50: CPT

## 2023-06-04 PROCEDURE — 93459 L HRT ART/GRFT ANGIO: CPT | Mod: 26

## 2023-06-04 PROCEDURE — 36000 PLACE NEEDLE IN VEIN: CPT

## 2023-06-04 RX ORDER — SENNA PLUS 8.6 MG/1
2 TABLET ORAL AT BEDTIME
Refills: 0 | Status: DISCONTINUED | OUTPATIENT
Start: 2023-06-04 | End: 2023-06-07

## 2023-06-04 RX ORDER — LOSARTAN POTASSIUM 100 MG/1
25 TABLET, FILM COATED ORAL EVERY 12 HOURS
Refills: 0 | Status: DISCONTINUED | OUTPATIENT
Start: 2023-06-04 | End: 2023-06-05

## 2023-06-04 RX ORDER — ISOSORBIDE MONONITRATE 60 MG/1
30 TABLET, EXTENDED RELEASE ORAL EVERY 24 HOURS
Refills: 0 | Status: DISCONTINUED | OUTPATIENT
Start: 2023-06-04 | End: 2023-06-07

## 2023-06-04 RX ORDER — HEPARIN SODIUM 5000 [USP'U]/ML
5000 INJECTION INTRAVENOUS; SUBCUTANEOUS EVERY 8 HOURS
Refills: 0 | Status: DISCONTINUED | OUTPATIENT
Start: 2023-06-04 | End: 2023-06-04

## 2023-06-04 RX ORDER — SODIUM CHLORIDE 9 MG/ML
1000 INJECTION INTRAMUSCULAR; INTRAVENOUS; SUBCUTANEOUS
Refills: 0 | Status: DISCONTINUED | OUTPATIENT
Start: 2023-06-04 | End: 2023-06-06

## 2023-06-04 RX ORDER — AMLODIPINE BESYLATE 2.5 MG/1
5 TABLET ORAL ONCE
Refills: 0 | Status: COMPLETED | OUTPATIENT
Start: 2023-06-04 | End: 2023-06-04

## 2023-06-04 RX ORDER — HEPARIN SODIUM 5000 [USP'U]/ML
7500 INJECTION INTRAVENOUS; SUBCUTANEOUS EVERY 8 HOURS
Refills: 0 | Status: DISCONTINUED | OUTPATIENT
Start: 2023-06-04 | End: 2023-06-05

## 2023-06-04 RX ADMIN — Medication 81 MILLIGRAM(S): at 09:23

## 2023-06-04 RX ADMIN — Medication 2: at 06:48

## 2023-06-04 RX ADMIN — LOSARTAN POTASSIUM 25 MILLIGRAM(S): 100 TABLET, FILM COATED ORAL at 05:44

## 2023-06-04 RX ADMIN — LOSARTAN POTASSIUM 25 MILLIGRAM(S): 100 TABLET, FILM COATED ORAL at 17:04

## 2023-06-04 RX ADMIN — ATORVASTATIN CALCIUM 40 MILLIGRAM(S): 80 TABLET, FILM COATED ORAL at 21:14

## 2023-06-04 RX ADMIN — ISOSORBIDE MONONITRATE 30 MILLIGRAM(S): 60 TABLET, EXTENDED RELEASE ORAL at 13:20

## 2023-06-04 RX ADMIN — Medication 2: at 16:42

## 2023-06-04 RX ADMIN — Medication 2: at 21:35

## 2023-06-04 RX ADMIN — SODIUM CHLORIDE 150 MILLILITER(S): 9 INJECTION INTRAMUSCULAR; INTRAVENOUS; SUBCUTANEOUS at 18:14

## 2023-06-04 RX ADMIN — CLOPIDOGREL BISULFATE 75 MILLIGRAM(S): 75 TABLET, FILM COATED ORAL at 09:23

## 2023-06-04 RX ADMIN — AMLODIPINE BESYLATE 5 MILLIGRAM(S): 2.5 TABLET ORAL at 00:10

## 2023-06-04 RX ADMIN — Medication 2000 UNIT(S): at 11:38

## 2023-06-04 RX ADMIN — HEPARIN SODIUM 7500 UNIT(S): 5000 INJECTION INTRAVENOUS; SUBCUTANEOUS at 21:14

## 2023-06-04 NOTE — DIETITIAN INITIAL EVALUATION ADULT - OTHER CALCULATIONS
5'8''  pounds +-10%  Wt 240 pounds BMI 36.5 %[id number]  IBW used to calculate energy needs due to pt's current body weight exceeding 120% of IBW  Adjust for age and CHX hx; fluids per team

## 2023-06-04 NOTE — PROGRESS NOTE ADULT - SUBJECTIVE AND OBJECTIVE BOX
INTERVAL HPI/OVERNIGHT EVENTS:  O/N: 10pm SBP 180s- Norvasc 10mg given with improvement to 160s. PTT therapeutic.   This morning: Patient was seen and examined at bedside.  Denies any chest pain, SOB, palpitations, or other pain. Ate last night. Last BM on Friday, urinating well.     VITAL SIGNS:  T(F): 98.6 (06-04-23 @ 09:52)  HR: 66 (06-04-23 @ 09:55)  BP: 167/75 (06-04-23 @ 09:55)  RR: 18 (06-04-23 @ 09:55)  SpO2: 99% (06-04-23 @ 09:55)  Wt(kg): --    PHYSICAL EXAM:    Constitutional: NAD  HEENT: EOMI, sclera non-icteric, dry MM  Respiratory: CTA b/l, good air entry b/l, no wheezing, no rhonchi, no rales, without accessory muscle use and no intercostal retractions  Cardiovascular: RRR, normal S1S2, no M/R/G  Gastrointestinal: abdomen soft, obese, NTND  Extremities: Warm, well perfused, 1-2+ edema to knees  Neurological: AAOx3, moves all extremities    MEDICATIONS  (STANDING):  ascorbic acid 500 milliGRAM(s) Oral daily  aspirin enteric coated 81 milliGRAM(s) Oral daily  atorvastatin 40 milliGRAM(s) Oral at bedtime  cholecalciferol 2000 Unit(s) Oral daily  clopidogrel Tablet 75 milliGRAM(s) Oral daily  dextrose 5%. 1000 milliLiter(s) (50 mL/Hr) IV Continuous <Continuous>  dextrose 5%. 1000 milliLiter(s) (100 mL/Hr) IV Continuous <Continuous>  dextrose 50% Injectable 12.5 Gram(s) IV Push once  dextrose 50% Injectable 25 Gram(s) IV Push once  dextrose 50% Injectable 25 Gram(s) IV Push once  glucagon  Injectable 1 milliGRAM(s) IntraMuscular once  heparin  Infusion. 900 Unit(s)/Hr (9 mL/Hr) IV Continuous <Continuous>  insulin lispro (ADMELOG) corrective regimen sliding scale   SubCutaneous Before meals and at bedtime  losartan 25 milliGRAM(s) Oral every 12 hours    MEDICATIONS  (PRN):  dextrose Oral Gel 15 Gram(s) Oral once PRN Blood Glucose LESS THAN 70 milliGRAM(s)/deciliter  heparin   Injectable 6000 Unit(s) IV Push every 6 hours PRN For aPTT less than 40      Allergies    No Known Allergies    Intolerances        LABS:                        12.0   6.57  )-----------( 145      ( 04 Jun 2023 06:48 )             36.6     06-04    136  |  103  |  29<H>  ----------------------------<  156<H>  4.1   |  25  |  1.31<H>    Ca    8.3<L>      04 Jun 2023 06:48  Phos  3.4     06-03  Mg     2.4     06-03    TPro  6.3  /  Alb  3.7  /  TBili  0.4  /  DBili  x   /  AST  32  /  ALT  20  /  AlkPhos  51  06-03    PT/INR - ( 03 Jun 2023 00:53 )   PT: 11.3 sec;   INR: 0.95          PTT - ( 04 Jun 2023 06:48 )  PTT:60.2 sec            RADIOLOGY & ADDITIONAL TESTS:  Reviewed

## 2023-06-04 NOTE — PROGRESS NOTE ADULT - PROBLEM SELECTOR PLAN 7
F: None  E: replete as needed  N: Dash diet    FULL CODE  DVT Prophylaxis: Heparin gtt  Dispo: Cardiac tele F: None  E: replete as needed  N: Dash diet  GOC: FULL CODE  DVT Prophylaxis: heparin subQ  Dispo: Cardiac tele

## 2023-06-04 NOTE — PROGRESS NOTE ADULT - PROBLEM SELECTOR PLAN 1
Patient currently C/P free and HD stable . Hx of CAD with multiple PCIs with subsequent 2V CABG 2017 LIMA-LAD reverse SVG- PDA), presenting with exertional C/P x 1 week worsening x 1 day   -Initial EKG NSR at 75 bpm with 1 mm ST elevation inferior leads (III, AVF) with 1 mm ST depressions I, AVL (new compared to EKG 2020). STEMI code called by ER. Interventionalist reviewed EKG. Stat bedside echo revealed no wall motion abnormalities. Code STEMI cancelled.  - C/w heparin gtt  - Plavix loaded with 600mg, c/w 75mg daily  - C/w aspirin 81mg daily  - C/w lipitor 40mg Patient currently C/P free and HD stable . Hx of CAD with multiple PCIs with subsequent 2V CABG 2017 LIMA-LAD reverse SVG- PDA), presenting with exertional C/P x 1 week worsening x 1 day   -Initial EKG NSR at 75 bpm with 1 mm ST elevation inferior leads (III, AVF) with 1 mm ST depressions I, AVL (new compared to EKG 2020). STEMI code called by ER. Interventionalist reviewed EKG. Stat bedside echo revealed no wall motion abnormalities. Code STEMI cancelled.  Went for cath on 6/4 AM: : dLM into pLCx (90% severely calcified), OM1 mild dz, pLAD mod dz, mLAD 100% , D1 mild-mod dz, pRCA 100% ; Grafts: patent LIMA-LAD w/ large branch from LIMA to chest wall, patent SVG-RPDA w/ collaterals to LAD; HAMMAD 12mmHg, L radial access. Will require ROTA approach, tentatively planned for Tues. 6/6.  Plan:  - DC heparin gtt -> stopped after diagnostic cath 6/4 AM with no intervention  - Plavix loaded with 600mg, c/w 75mg daily  - C/w aspirin 81mg daily  - C/w lipitor 40mg  - repeat EKG

## 2023-06-04 NOTE — DIETITIAN INITIAL EVALUATION ADULT - OTHER INFO
73M PMH obesity, HTN, Hyperlipidemia, DM Type II, CAD s/p multiple PCIs (05/24/2005 PCI to mLAD x2, pLAD and 04/18/2005 PCI to mid/prox/distal RCA, OM2), and CABG x 2 in 2017 (LIMA-LAD reverse SVG- PDA), CKD Stage III (Baseline Cr 1.4), previous Covid infection, Chronic Diastolic CHF (EF 60% by echo 2020-admitted for exacerbation) who presented to [facility] ER c/o worsening C/P x 1 day found to have 1 mm ST elevations in inferior leads with reciprocal changes in lateral leads. STEMI code called and cancelled as patient remained chest pain free in the ER and bedside echo revealed no wall motion abnormalities. Pt is now admitted for further management of NSTEMI. Now pending CATH.    Pt admitted to [facility]. Spoke with daughter at bedside (who declined extensive RD visit) as pt receiving PCA care. Ordered for DASH TLC/NPO@12 6/3. PTA reported pt eating well on kosher diet. Family helps pt with meals. NKFA. No issues chewing/swallowing. Wt hx not provided. Admit wt 240 pounds. Pt remains with Edema which may be masking true dry wt; 2+(left arm; right arm), 3+(left leg; right leg; left ankle; right ankle). No pain. Gallo 20. No pressure ulcers. , A1c 8.2%. GI WDL per flow sheets.   Please see below for nutritions recommendations.

## 2023-06-04 NOTE — DIETITIAN INITIAL EVALUATION ADULT - PERTINENT MEDS FT
MEDICATIONS  (STANDING):  ascorbic acid 500 milliGRAM(s) Oral daily  aspirin enteric coated 81 milliGRAM(s) Oral daily  atorvastatin 40 milliGRAM(s) Oral at bedtime  cholecalciferol 2000 Unit(s) Oral daily  clopidogrel Tablet 75 milliGRAM(s) Oral daily  dextrose 5%. 1000 milliLiter(s) (50 mL/Hr) IV Continuous <Continuous>  dextrose 5%. 1000 milliLiter(s) (100 mL/Hr) IV Continuous <Continuous>  dextrose 50% Injectable 12.5 Gram(s) IV Push once  dextrose 50% Injectable 25 Gram(s) IV Push once  dextrose 50% Injectable 25 Gram(s) IV Push once  glucagon  Injectable 1 milliGRAM(s) IntraMuscular once  insulin lispro (ADMELOG) corrective regimen sliding scale   SubCutaneous Before meals and at bedtime  isosorbide   mononitrate ER Tablet (IMDUR) 30 milliGRAM(s) Oral every 24 hours  losartan 25 milliGRAM(s) Oral every 12 hours  senna 2 Tablet(s) Oral at bedtime    MEDICATIONS  (PRN):  dextrose Oral Gel 15 Gram(s) Oral once PRN Blood Glucose LESS THAN 70 milliGRAM(s)/deciliter

## 2023-06-04 NOTE — DIETITIAN INITIAL EVALUATION ADULT - PROBLEM SELECTOR PLAN 6
KY interval on admission . 2020 EKG revealed KY interval 310. No signs of blocks or bradycardia.    -Holding Metoprolol   -Continue to monitor telemetry and serial EKGs.   -Consider EP consult    FULL CODE  DVT Prophylaxis: Heparin IV  Dispo: Pending clinical progression

## 2023-06-04 NOTE — PROGRESS NOTE ADULT - PROBLEM SELECTOR PLAN 2
-SBP 150s.   - C/w losartan 25mg BID  - If patient's Cr worsens, consider switching to amlodipine.  - Holding home metoprolol given prolonged MO interval. -SBP 150s-170s  Plan:  - C/w losartan 25mg BID  - started imdur 30 on 6/4 for HTN and anti-anginal effects  - If patient's Cr worsens, consider switching to amlodipine vs. nifedipine  - Holding home metoprolol given prolonged VT interval.

## 2023-06-04 NOTE — DIETITIAN INITIAL EVALUATION ADULT - PERTINENT LABORATORY DATA
06-04    136  |  103  |  29<H>  ----------------------------<  156<H>  4.1   |  25  |  1.31<H>    Ca    8.3<L>      04 Jun 2023 06:48  Phos  3.4     06-03  Mg     2.4     06-03    TPro  6.3  /  Alb  3.7  /  TBili  0.4  /  DBili  x   /  AST  32  /  ALT  20  /  AlkPhos  51  06-03  POCT Blood Glucose.: 121 mg/dL (06-04-23 @ 11:35)  A1C with Estimated Average Glucose Result: 8.2 % (06-03-23 @ 02:30)

## 2023-06-04 NOTE — DIETITIAN INITIAL EVALUATION ADULT - DIET TYPE
Diet to be advanced in 24-48hrs as medically feasible: DASH TLC, CONSCHO diet, fluid restriction per team

## 2023-06-04 NOTE — DIETITIAN INITIAL EVALUATION ADULT - PROBLEM SELECTOR PLAN 2
-SBP 150s. Holding ACE and Lasix due to slight increase in Cr above baseline and probable need for a cardiac cath. Holding Metoprolol due to prolonged MD interval.    -Continue to monitor BP

## 2023-06-05 ENCOUNTER — APPOINTMENT (OUTPATIENT)
Dept: HEART AND VASCULAR | Facility: CLINIC | Age: 73
End: 2023-06-05

## 2023-06-05 DIAGNOSIS — I48.92 UNSPECIFIED ATRIAL FLUTTER: ICD-10-CM

## 2023-06-05 LAB
ANION GAP SERPL CALC-SCNC: 11 MMOL/L — SIGNIFICANT CHANGE UP (ref 5–17)
APTT BLD: 31.6 SEC — SIGNIFICANT CHANGE UP (ref 27.5–35.5)
APTT BLD: 71.4 SEC — HIGH (ref 27.5–35.5)
BASOPHILS # BLD AUTO: 0 K/UL — SIGNIFICANT CHANGE UP (ref 0–0.2)
BASOPHILS NFR BLD AUTO: 0 % — SIGNIFICANT CHANGE UP (ref 0–2)
BLD GP AB SCN SERPL QL: NEGATIVE — SIGNIFICANT CHANGE UP
BUN SERPL-MCNC: 34 MG/DL — HIGH (ref 7–23)
CALCIUM SERPL-MCNC: 8.2 MG/DL — LOW (ref 8.4–10.5)
CHLORIDE SERPL-SCNC: 103 MMOL/L — SIGNIFICANT CHANGE UP (ref 96–108)
CO2 SERPL-SCNC: 21 MMOL/L — LOW (ref 22–31)
CREAT SERPL-MCNC: 1.5 MG/DL — HIGH (ref 0.5–1.3)
EGFR: 49 ML/MIN/1.73M2 — LOW
EOSINOPHIL # BLD AUTO: 0.21 K/UL — SIGNIFICANT CHANGE UP (ref 0–0.5)
EOSINOPHIL NFR BLD AUTO: 3.5 % — SIGNIFICANT CHANGE UP (ref 0–6)
GIANT PLATELETS BLD QL SMEAR: PRESENT — SIGNIFICANT CHANGE UP
GLUCOSE BLDC GLUCOMTR-MCNC: 137 MG/DL — HIGH (ref 70–99)
GLUCOSE BLDC GLUCOMTR-MCNC: 175 MG/DL — HIGH (ref 70–99)
GLUCOSE BLDC GLUCOMTR-MCNC: 179 MG/DL — HIGH (ref 70–99)
GLUCOSE BLDC GLUCOMTR-MCNC: 223 MG/DL — HIGH (ref 70–99)
GLUCOSE SERPL-MCNC: 137 MG/DL — HIGH (ref 70–99)
HCT VFR BLD CALC: 34.8 % — LOW (ref 39–50)
HGB BLD-MCNC: 11.3 G/DL — LOW (ref 13–17)
INR BLD: 0.97 — SIGNIFICANT CHANGE UP (ref 0.88–1.16)
LYMPHOCYTES # BLD AUTO: 0.53 K/UL — LOW (ref 1–3.3)
LYMPHOCYTES # BLD AUTO: 8.9 % — LOW (ref 13–44)
MAGNESIUM SERPL-MCNC: 2 MG/DL — SIGNIFICANT CHANGE UP (ref 1.6–2.6)
MANUAL SMEAR VERIFICATION: SIGNIFICANT CHANGE UP
MCHC RBC-ENTMCNC: 31 PG — SIGNIFICANT CHANGE UP (ref 27–34)
MCHC RBC-ENTMCNC: 32.5 GM/DL — SIGNIFICANT CHANGE UP (ref 32–36)
MCV RBC AUTO: 95.6 FL — SIGNIFICANT CHANGE UP (ref 80–100)
MONOCYTES # BLD AUTO: 0.57 K/UL — SIGNIFICANT CHANGE UP (ref 0–0.9)
MONOCYTES NFR BLD AUTO: 9.7 % — SIGNIFICANT CHANGE UP (ref 2–14)
NEUTROPHILS # BLD AUTO: 4.61 K/UL — SIGNIFICANT CHANGE UP (ref 1.8–7.4)
NEUTROPHILS NFR BLD AUTO: 77.9 % — HIGH (ref 43–77)
PHOSPHATE SERPL-MCNC: 3.3 MG/DL — SIGNIFICANT CHANGE UP (ref 2.5–4.5)
PLAT MORPH BLD: ABNORMAL
PLATELET # BLD AUTO: 134 K/UL — LOW (ref 150–400)
POLYCHROMASIA BLD QL SMEAR: SLIGHT — SIGNIFICANT CHANGE UP
POTASSIUM SERPL-MCNC: 4.2 MMOL/L — SIGNIFICANT CHANGE UP (ref 3.5–5.3)
POTASSIUM SERPL-SCNC: 4.2 MMOL/L — SIGNIFICANT CHANGE UP (ref 3.5–5.3)
PROTHROM AB SERPL-ACNC: 11.5 SEC — SIGNIFICANT CHANGE UP (ref 10.5–13.4)
RBC # BLD: 3.64 M/UL — LOW (ref 4.2–5.8)
RBC # FLD: 15 % — HIGH (ref 10.3–14.5)
RBC BLD AUTO: ABNORMAL
RH IG SCN BLD-IMP: POSITIVE — SIGNIFICANT CHANGE UP
SODIUM SERPL-SCNC: 135 MMOL/L — SIGNIFICANT CHANGE UP (ref 135–145)
WBC # BLD: 5.92 K/UL — SIGNIFICANT CHANGE UP (ref 3.8–10.5)
WBC # FLD AUTO: 5.92 K/UL — SIGNIFICANT CHANGE UP (ref 3.8–10.5)

## 2023-06-05 PROCEDURE — 99233 SBSQ HOSP IP/OBS HIGH 50: CPT

## 2023-06-05 RX ORDER — HEPARIN SODIUM 5000 [USP'U]/ML
900 INJECTION INTRAVENOUS; SUBCUTANEOUS
Qty: 25000 | Refills: 0 | Status: DISCONTINUED | OUTPATIENT
Start: 2023-06-05 | End: 2023-06-06

## 2023-06-05 RX ADMIN — LOSARTAN POTASSIUM 25 MILLIGRAM(S): 100 TABLET, FILM COATED ORAL at 05:08

## 2023-06-05 RX ADMIN — Medication 2: at 16:18

## 2023-06-05 RX ADMIN — CLOPIDOGREL BISULFATE 75 MILLIGRAM(S): 75 TABLET, FILM COATED ORAL at 11:37

## 2023-06-05 RX ADMIN — Medication 2000 UNIT(S): at 11:37

## 2023-06-05 RX ADMIN — HEPARIN SODIUM 9 UNIT(S)/HR: 5000 INJECTION INTRAVENOUS; SUBCUTANEOUS at 12:33

## 2023-06-05 RX ADMIN — ISOSORBIDE MONONITRATE 30 MILLIGRAM(S): 60 TABLET, EXTENDED RELEASE ORAL at 11:37

## 2023-06-05 RX ADMIN — Medication 2: at 21:58

## 2023-06-05 RX ADMIN — Medication 81 MILLIGRAM(S): at 11:37

## 2023-06-05 RX ADMIN — ATORVASTATIN CALCIUM 40 MILLIGRAM(S): 80 TABLET, FILM COATED ORAL at 21:10

## 2023-06-05 RX ADMIN — Medication 500 MILLIGRAM(S): at 11:37

## 2023-06-05 RX ADMIN — Medication 4: at 11:38

## 2023-06-05 RX ADMIN — HEPARIN SODIUM 7500 UNIT(S): 5000 INJECTION INTRAVENOUS; SUBCUTANEOUS at 05:08

## 2023-06-05 NOTE — PROGRESS NOTE ADULT - PROBLEM SELECTOR PLAN 1
Patient currently C/P free and HD stable . Hx of CAD with multiple PCIs with subsequent 2V CABG 2017 LIMA-LAD reverse SVG- PDA), presenting with exertional C/P x 1 week worsening x 1 day   -Initial EKG NSR at 75 bpm with 1 mm ST elevation inferior leads (III, AVF) with 1 mm ST depressions I, AVL (new compared to EKG 2020). STEMI code called by ER. Interventionalist reviewed EKG. Stat bedside echo revealed no wall motion abnormalities. Code STEMI cancelled.  Went for cath on 6/4 AM: : dLM into pLCx (90% severely calcified), OM1 mild dz, pLAD mod dz, mLAD 100% , D1 mild-mod dz, pRCA 100% ; Grafts: patent LIMA-LAD w/ large branch from LIMA to chest wall, patent SVG-RPDA w/ collaterals to LAD; HAMMAD 12mmHg, L radial access. Will require ROTA approach, tentatively planned for Tues. 6/6.  Plan:  - DC heparin gtt -> stopped after diagnostic cath 6/4 AM with no intervention -> will restart 6/5 pending cath on 6/6  - Plavix loaded with 600mg, c/w 75mg daily  - C/w aspirin 81mg daily  - C/w lipitor 40mg  - repeat EKG

## 2023-06-05 NOTE — PROGRESS NOTE ADULT - PROBLEM SELECTOR PLAN 7
HI interval on admission . 2020 EKG revealed HI interval 310. No signs of blocks or bradycardia.    -Holding home Metoprolol   -Continue to monitor telemetry and serial EKGs.

## 2023-06-05 NOTE — PROGRESS NOTE ADULT - SUBJECTIVE AND OBJECTIVE BOX
INTERVAL HPI/OVERNIGHT EVENTS:  O/N: no acute events overnight  This morning: Patient was seen and examined at bedside.  Feeling well, comfortable, no pain or palpitations. No chest pain. Having good BMs and urinating okay.     VITAL SIGNS:  T(F): 99.2 (06-05-23 @ 09:41)  HR: 81 (06-05-23 @ 08:44)  BP: 117/57 (06-05-23 @ 08:44)  RR: 18 (06-05-23 @ 08:44)  SpO2: 96% (06-05-23 @ 08:44)  Wt(kg): --    PHYSICAL EXAM:    Constitutional: NAD  HEENT: EOMI, sclera non-icteric  Respiratory: CTA b/l, good air entry b/l, no wheezing, no rhonchi, no rales, without accessory muscle use and no intercostal retractions  Cardiovascular: RRR, normal S1S2, no M/R/G  Gastrointestinal: abdomen soft, obese, NTND  Extremities: Warm, well perfused, 1-2+ edema to knees  Neurological: AAOx3    MEDICATIONS  (STANDING):  ascorbic acid 500 milliGRAM(s) Oral daily  aspirin enteric coated 81 milliGRAM(s) Oral daily  atorvastatin 40 milliGRAM(s) Oral at bedtime  cholecalciferol 2000 Unit(s) Oral daily  clopidogrel Tablet 75 milliGRAM(s) Oral daily  dextrose 5%. 1000 milliLiter(s) (100 mL/Hr) IV Continuous <Continuous>  dextrose 5%. 1000 milliLiter(s) (50 mL/Hr) IV Continuous <Continuous>  dextrose 50% Injectable 12.5 Gram(s) IV Push once  dextrose 50% Injectable 25 Gram(s) IV Push once  dextrose 50% Injectable 25 Gram(s) IV Push once  glucagon  Injectable 1 milliGRAM(s) IntraMuscular once  heparin   Injectable 7500 Unit(s) SubCutaneous every 8 hours  insulin lispro (ADMELOG) corrective regimen sliding scale   SubCutaneous Before meals and at bedtime  isosorbide   mononitrate ER Tablet (IMDUR) 30 milliGRAM(s) Oral every 24 hours  losartan 25 milliGRAM(s) Oral every 12 hours  senna 2 Tablet(s) Oral at bedtime  sodium chloride 0.9%. 1000 milliLiter(s) (150 mL/Hr) IV Continuous <Continuous>    MEDICATIONS  (PRN):  dextrose Oral Gel 15 Gram(s) Oral once PRN Blood Glucose LESS THAN 70 milliGRAM(s)/deciliter      Allergies    No Known Allergies    Intolerances        LABS:                        11.3   5.92  )-----------( 134      ( 05 Jun 2023 05:30 )             34.8     06-05    135  |  103  |  34<H>  ----------------------------<  137<H>  4.2   |  21<L>  |  1.50<H>    Ca    8.2<L>      05 Jun 2023 05:30  Phos  3.3     06-05  Mg     2.0     06-05      PT/INR - ( 05 Jun 2023 05:30 )   PT: 11.5 sec;   INR: 0.97          PTT - ( 05 Jun 2023 05:30 )  PTT:31.6 sec            RADIOLOGY & ADDITIONAL TESTS:  Reviewed

## 2023-06-05 NOTE — PROGRESS NOTE ADULT - PROBLEM SELECTOR PLAN 2
New Atrial flutter noted overnight 6/4 into 6/5. Discussed with Dr. Sharpe on 6/5, no known history of flutter or afib. TTE 6/3/23 showed EF 60%, mild symmetric left ventricular hypertrophy, moderately dilated left atrium.    Plan:  - restart heparin gtt 6/5  - address underlying ischemia, then may be candidate for EP intervention later  - holding BB i/s/o prolonged ND interval

## 2023-06-06 LAB
ALBUMIN SERPL ELPH-MCNC: 3.6 G/DL — SIGNIFICANT CHANGE UP (ref 3.3–5)
ALP SERPL-CCNC: 58 U/L — SIGNIFICANT CHANGE UP (ref 40–120)
ALT FLD-CCNC: 28 U/L — SIGNIFICANT CHANGE UP (ref 10–45)
ANION GAP SERPL CALC-SCNC: 12 MMOL/L — SIGNIFICANT CHANGE UP (ref 5–17)
APTT BLD: 87.6 SEC — HIGH (ref 27.5–35.5)
AST SERPL-CCNC: 37 U/L — SIGNIFICANT CHANGE UP (ref 10–40)
BASOPHILS # BLD AUTO: 0.03 K/UL — SIGNIFICANT CHANGE UP (ref 0–0.2)
BASOPHILS NFR BLD AUTO: 0.7 % — SIGNIFICANT CHANGE UP (ref 0–2)
BILIRUB SERPL-MCNC: 0.5 MG/DL — SIGNIFICANT CHANGE UP (ref 0.2–1.2)
BUN SERPL-MCNC: 40 MG/DL — HIGH (ref 7–23)
CALCIUM SERPL-MCNC: 7.8 MG/DL — LOW (ref 8.4–10.5)
CHLORIDE SERPL-SCNC: 104 MMOL/L — SIGNIFICANT CHANGE UP (ref 96–108)
CO2 SERPL-SCNC: 21 MMOL/L — LOW (ref 22–31)
CREAT SERPL-MCNC: 1.78 MG/DL — HIGH (ref 0.5–1.3)
EGFR: 40 ML/MIN/1.73M2 — LOW
EOSINOPHIL # BLD AUTO: 0.25 K/UL — SIGNIFICANT CHANGE UP (ref 0–0.5)
EOSINOPHIL NFR BLD AUTO: 5.7 % — SIGNIFICANT CHANGE UP (ref 0–6)
GLUCOSE BLDC GLUCOMTR-MCNC: 129 MG/DL — HIGH (ref 70–99)
GLUCOSE BLDC GLUCOMTR-MCNC: 156 MG/DL — HIGH (ref 70–99)
GLUCOSE BLDC GLUCOMTR-MCNC: 173 MG/DL — HIGH (ref 70–99)
GLUCOSE SERPL-MCNC: 131 MG/DL — HIGH (ref 70–99)
HCT VFR BLD CALC: 34.4 % — LOW (ref 39–50)
HGB BLD-MCNC: 11.3 G/DL — LOW (ref 13–17)
IMM GRANULOCYTES NFR BLD AUTO: 0.5 % — SIGNIFICANT CHANGE UP (ref 0–0.9)
INR BLD: 0.98 — SIGNIFICANT CHANGE UP (ref 0.88–1.16)
ISTAT ACTK (ACTIVATED CLOTTING TIME KAOLIN): 233 SEC — HIGH (ref 74–137)
ISTAT ACTK (ACTIVATED CLOTTING TIME KAOLIN): 233 SEC — HIGH (ref 74–137)
ISTAT ACTK (ACTIVATED CLOTTING TIME KAOLIN): 269 SEC — HIGH (ref 74–137)
ISTAT ACTK (ACTIVATED CLOTTING TIME KAOLIN): 275 SEC — HIGH (ref 74–137)
ISTAT ACTK (ACTIVATED CLOTTING TIME KAOLIN): 299 SEC — HIGH (ref 74–137)
ISTAT ACTK (ACTIVATED CLOTTING TIME KAOLIN): <50 SEC — LOW (ref 74–137)
LYMPHOCYTES # BLD AUTO: 1.29 K/UL — SIGNIFICANT CHANGE UP (ref 1–3.3)
LYMPHOCYTES # BLD AUTO: 29.5 % — SIGNIFICANT CHANGE UP (ref 13–44)
MAGNESIUM SERPL-MCNC: 2.2 MG/DL — SIGNIFICANT CHANGE UP (ref 1.6–2.6)
MCHC RBC-ENTMCNC: 31 PG — SIGNIFICANT CHANGE UP (ref 27–34)
MCHC RBC-ENTMCNC: 32.8 GM/DL — SIGNIFICANT CHANGE UP (ref 32–36)
MCV RBC AUTO: 94.5 FL — SIGNIFICANT CHANGE UP (ref 80–100)
MONOCYTES # BLD AUTO: 0.97 K/UL — HIGH (ref 0–0.9)
MONOCYTES NFR BLD AUTO: 22.2 % — HIGH (ref 2–14)
NEUTROPHILS # BLD AUTO: 1.81 K/UL — SIGNIFICANT CHANGE UP (ref 1.8–7.4)
NEUTROPHILS NFR BLD AUTO: 41.4 % — LOW (ref 43–77)
NRBC # BLD: 0 /100 WBCS — SIGNIFICANT CHANGE UP (ref 0–0)
PHOSPHATE SERPL-MCNC: 3.8 MG/DL — SIGNIFICANT CHANGE UP (ref 2.5–4.5)
PLATELET # BLD AUTO: 156 K/UL — SIGNIFICANT CHANGE UP (ref 150–400)
POTASSIUM SERPL-MCNC: 4.3 MMOL/L — SIGNIFICANT CHANGE UP (ref 3.5–5.3)
POTASSIUM SERPL-SCNC: 4.3 MMOL/L — SIGNIFICANT CHANGE UP (ref 3.5–5.3)
PROT SERPL-MCNC: 6.1 G/DL — SIGNIFICANT CHANGE UP (ref 6–8.3)
PROTHROM AB SERPL-ACNC: 11.7 SEC — SIGNIFICANT CHANGE UP (ref 10.5–13.4)
RBC # BLD: 3.64 M/UL — LOW (ref 4.2–5.8)
RBC # FLD: 14.5 % — SIGNIFICANT CHANGE UP (ref 10.3–14.5)
SODIUM SERPL-SCNC: 137 MMOL/L — SIGNIFICANT CHANGE UP (ref 135–145)
WBC # BLD: 4.37 K/UL — SIGNIFICANT CHANGE UP (ref 3.8–10.5)
WBC # FLD AUTO: 4.37 K/UL — SIGNIFICANT CHANGE UP (ref 3.8–10.5)

## 2023-06-06 PROCEDURE — 92929: CPT | Mod: LC

## 2023-06-06 PROCEDURE — 92933 PRQ TRLML C ATHRC ST ANGIOP1: CPT | Mod: LC

## 2023-06-06 PROCEDURE — 92928 PRQ TCAT PLMT NTRAC ST 1 LES: CPT | Mod: LM

## 2023-06-06 PROCEDURE — 92920 PRQ TRLUML C ANGIOP 1ART&/BR: CPT | Mod: LD

## 2023-06-06 PROCEDURE — 92978 ENDOLUMINL IVUS OCT C 1ST: CPT | Mod: 26,LC

## 2023-06-06 PROCEDURE — 99152 MOD SED SAME PHYS/QHP 5/>YRS: CPT

## 2023-06-06 PROCEDURE — 99233 SBSQ HOSP IP/OBS HIGH 50: CPT

## 2023-06-06 PROCEDURE — 92979 ENDOLUMINL IVUS OCT C EA: CPT | Mod: 26,LM

## 2023-06-06 RX ORDER — SODIUM CHLORIDE 9 MG/ML
1000 INJECTION INTRAMUSCULAR; INTRAVENOUS; SUBCUTANEOUS
Refills: 0 | Status: DISCONTINUED | OUTPATIENT
Start: 2023-06-06 | End: 2023-06-07

## 2023-06-06 RX ADMIN — Medication 500 MILLIGRAM(S): at 11:36

## 2023-06-06 RX ADMIN — Medication 81 MILLIGRAM(S): at 07:14

## 2023-06-06 RX ADMIN — SODIUM CHLORIDE 250 MILLILITER(S): 9 INJECTION INTRAMUSCULAR; INTRAVENOUS; SUBCUTANEOUS at 20:38

## 2023-06-06 RX ADMIN — CLOPIDOGREL BISULFATE 75 MILLIGRAM(S): 75 TABLET, FILM COATED ORAL at 07:14

## 2023-06-06 RX ADMIN — Medication 2: at 06:43

## 2023-06-06 RX ADMIN — ISOSORBIDE MONONITRATE 30 MILLIGRAM(S): 60 TABLET, EXTENDED RELEASE ORAL at 11:36

## 2023-06-06 RX ADMIN — Medication 2000 UNIT(S): at 11:36

## 2023-06-06 RX ADMIN — SODIUM CHLORIDE 75 MILLILITER(S): 9 INJECTION INTRAMUSCULAR; INTRAVENOUS; SUBCUTANEOUS at 08:45

## 2023-06-06 RX ADMIN — Medication 2: at 22:21

## 2023-06-06 RX ADMIN — ATORVASTATIN CALCIUM 40 MILLIGRAM(S): 80 TABLET, FILM COATED ORAL at 22:17

## 2023-06-06 NOTE — PROGRESS NOTE ADULT - PROBLEM SELECTOR PLAN 7
DE interval on admission . 2020 EKG revealed DE interval 310. No signs of blocks or bradycardia.    -Holding home Metoprolol   -Continue to monitor telemetry and serial EKGs.

## 2023-06-06 NOTE — PROGRESS NOTE ADULT - PROBLEM SELECTOR PROBLEM 6
CKD (chronic kidney disease) stage 3, GFR 30-59 ml/min
CKD (chronic kidney disease) stage 3, GFR 30-59 ml/min
Prolonged LA interval present on electrocardiogram
Prolonged ND interval present on electrocardiogram

## 2023-06-06 NOTE — PROGRESS NOTE ADULT - SUBJECTIVE AND OBJECTIVE BOX
O/N Events:    Subjective/ROS: Patient seen and examined at bedside.     Denies Fever/Chills, HA, CP, SOB, n/v, changes in bowel/urinary habits.  12pt ROS otherwise negative.    VITALS  Vital Signs Last 24 Hrs  T(C): 36.9 (06 Jun 2023 05:25), Max: 37.3 (05 Jun 2023 09:41)  T(F): 98.5 (06 Jun 2023 05:25), Max: 99.2 (05 Jun 2023 09:41)  HR: 64 (06 Jun 2023 08:40) (64 - 80)  BP: 142/67 (06 Jun 2023 08:40) (126/59 - 161/68)  BP(mean): 96 (06 Jun 2023 08:40) (85 - 98)  RR: 18 (06 Jun 2023 08:40) (18 - 18)  SpO2: 97% (06 Jun 2023 08:40) (95% - 97%)    Parameters below as of 06 Jun 2023 08:40  Patient On (Oxygen Delivery Method): room air        CAPILLARY BLOOD GLUCOSE      POCT Blood Glucose.: 156 mg/dL (06 Jun 2023 06:27)  POCT Blood Glucose.: 175 mg/dL (05 Jun 2023 21:44)  POCT Blood Glucose.: 179 mg/dL (05 Jun 2023 16:15)  POCT Blood Glucose.: 223 mg/dL (05 Jun 2023 11:19)      PHYSICAL EXAM  General: NAD  Head: NC/AT; MMM; PERRL; EOMI;  Neck: Supple; no JVD  Respiratory: CTAB; no wheezes/rales/rhonchi  Cardiovascular: Regular rhythm/rate; S1/S2+, no murmurs, rubs gallops   Gastrointestinal: Soft; NTND; bowel sounds normal and present  Extremities: WWP; no edema/cyanosis  Neurological: A&Ox3, CNII-XII grossly intact; no obvious focal deficits    MEDICATIONS  (STANDING):  ascorbic acid 500 milliGRAM(s) Oral daily  aspirin enteric coated 81 milliGRAM(s) Oral daily  atorvastatin 40 milliGRAM(s) Oral at bedtime  cholecalciferol 2000 Unit(s) Oral daily  clopidogrel Tablet 75 milliGRAM(s) Oral daily  dextrose 5%. 1000 milliLiter(s) (100 mL/Hr) IV Continuous <Continuous>  dextrose 5%. 1000 milliLiter(s) (50 mL/Hr) IV Continuous <Continuous>  dextrose 50% Injectable 12.5 Gram(s) IV Push once  dextrose 50% Injectable 25 Gram(s) IV Push once  dextrose 50% Injectable 25 Gram(s) IV Push once  glucagon  Injectable 1 milliGRAM(s) IntraMuscular once  heparin  Infusion 900 Unit(s)/Hr (9 mL/Hr) IV Continuous <Continuous>  insulin lispro (ADMELOG) corrective regimen sliding scale   SubCutaneous Before meals and at bedtime  isosorbide   mononitrate ER Tablet (IMDUR) 30 milliGRAM(s) Oral every 24 hours  senna 2 Tablet(s) Oral at bedtime  sodium chloride 0.9%. 1000 milliLiter(s) (75 mL/Hr) IV Continuous <Continuous>    MEDICATIONS  (PRN):  dextrose Oral Gel 15 Gram(s) Oral once PRN Blood Glucose LESS THAN 70 milliGRAM(s)/deciliter      No Known Allergies      LABS                        11.3   4.37  )-----------( 156      ( 06 Jun 2023 05:30 )             34.4     06-06    137  |  104  |  40<H>  ----------------------------<  131<H>  4.3   |  21<L>  |  1.78<H>    Ca    7.8<L>      06 Jun 2023 05:30  Phos  3.8     06-06  Mg     2.2     06-06    TPro  6.1  /  Alb  3.6  /  TBili  0.5  /  DBili  x   /  AST  37  /  ALT  28  /  AlkPhos  58  06-06    PT/INR - ( 06 Jun 2023 05:30 )   PT: 11.7 sec;   INR: 0.98          PTT - ( 06 Jun 2023 05:30 )  PTT:87.6 sec            IMAGING/EKG/ETC   O/N Events: MARY    Subjective/ROS: Patient seen and examined at bedside. Pt states he is feeling comfortable without any active chest pain. Patient is motivated to go for cath today.    Denies Fever/Chills, HA, CP, SOB, n/v, changes in bowel/urinary habits.  12pt ROS otherwise negative.    VITALS  Vital Signs Last 24 Hrs  T(C): 36.9 (06 Jun 2023 05:25), Max: 37.3 (05 Jun 2023 09:41)  T(F): 98.5 (06 Jun 2023 05:25), Max: 99.2 (05 Jun 2023 09:41)  HR: 64 (06 Jun 2023 08:40) (64 - 80)  BP: 142/67 (06 Jun 2023 08:40) (126/59 - 161/68)  BP(mean): 96 (06 Jun 2023 08:40) (85 - 98)  RR: 18 (06 Jun 2023 08:40) (18 - 18)  SpO2: 97% (06 Jun 2023 08:40) (95% - 97%)    Parameters below as of 06 Jun 2023 08:40  Patient On (Oxygen Delivery Method): room air        CAPILLARY BLOOD GLUCOSE      POCT Blood Glucose.: 156 mg/dL (06 Jun 2023 06:27)  POCT Blood Glucose.: 175 mg/dL (05 Jun 2023 21:44)  POCT Blood Glucose.: 179 mg/dL (05 Jun 2023 16:15)  POCT Blood Glucose.: 223 mg/dL (05 Jun 2023 11:19)      PHYSICAL EXAM  Constitutional: NAD  HEENT: EOMI, sclera non-icteric  Respiratory: CTA b/l, good air entry b/l, no wheezing, no rhonchi, no rales, without accessory muscle use and no intercostal retractions  Cardiovascular: RRR, normal S1S2, no M/R/G  Gastrointestinal: abdomen soft, obese, NTND  Extremities: Warm, well perfused, 1-2+ edema to knees  Neurological: AAOx3    MEDICATIONS  (STANDING):  ascorbic acid 500 milliGRAM(s) Oral daily  aspirin enteric coated 81 milliGRAM(s) Oral daily  atorvastatin 40 milliGRAM(s) Oral at bedtime  cholecalciferol 2000 Unit(s) Oral daily  clopidogrel Tablet 75 milliGRAM(s) Oral daily  dextrose 5%. 1000 milliLiter(s) (100 mL/Hr) IV Continuous <Continuous>  dextrose 5%. 1000 milliLiter(s) (50 mL/Hr) IV Continuous <Continuous>  dextrose 50% Injectable 12.5 Gram(s) IV Push once  dextrose 50% Injectable 25 Gram(s) IV Push once  dextrose 50% Injectable 25 Gram(s) IV Push once  glucagon  Injectable 1 milliGRAM(s) IntraMuscular once  heparin  Infusion 900 Unit(s)/Hr (9 mL/Hr) IV Continuous <Continuous>  insulin lispro (ADMELOG) corrective regimen sliding scale   SubCutaneous Before meals and at bedtime  isosorbide   mononitrate ER Tablet (IMDUR) 30 milliGRAM(s) Oral every 24 hours  senna 2 Tablet(s) Oral at bedtime  sodium chloride 0.9%. 1000 milliLiter(s) (75 mL/Hr) IV Continuous <Continuous>    MEDICATIONS  (PRN):  dextrose Oral Gel 15 Gram(s) Oral once PRN Blood Glucose LESS THAN 70 milliGRAM(s)/deciliter      No Known Allergies      LABS                        11.3   4.37  )-----------( 156      ( 06 Jun 2023 05:30 )             34.4     06-06    137  |  104  |  40<H>  ----------------------------<  131<H>  4.3   |  21<L>  |  1.78<H>    Ca    7.8<L>      06 Jun 2023 05:30  Phos  3.8     06-06  Mg     2.2     06-06    TPro  6.1  /  Alb  3.6  /  TBili  0.5  /  DBili  x   /  AST  37  /  ALT  28  /  AlkPhos  58  06-06    PT/INR - ( 06 Jun 2023 05:30 )   PT: 11.7 sec;   INR: 0.98          PTT - ( 06 Jun 2023 05:30 )  PTT:87.6 sec            IMAGING/EKG/ETC

## 2023-06-06 NOTE — PROGRESS NOTE ADULT - ASSESSMENT
73 obese M with PMHx HTN, Hyperlipidemia, DM Type II, CAD s/p multiple PCIs (05/24/2005 PCI to mLAD x2, pLAD and 04/18/2005 PCI to mid/prox/distal RCA, OM2), and CABG x 2 in 2017 (LIMA-LAD reverse SVG- PDA), CKD Stage III (Baseline Cr 1.4), previous Covid infection, Chronic Diastolic CHF (EF 60% by echo 2020-admitted for exacerbation) who presented to [facility] ER c/o worsening C/P x 1 day found to have 1 mm ST elevations in inferior leads with reciprocal changes in lateral leads. STEMI code called and cancelled as patient remained chest pain free in the ER and bedside echo revealed no wall motion abnormalities. Patient is now admitted for further management of NSTEMI. 
73M PMH obesity, HTN, Hyperlipidemia, DM Type II, CAD s/p multiple PCIs (05/24/2005 PCI to mLAD x2, pLAD and 04/18/2005 PCI to mid/prox/distal RCA, OM2), and CABG x 2 in 2017 (LIMA-LAD reverse SVG- PDA), CKD Stage III (Baseline Cr 1.4), previous Covid infection, Chronic Diastolic CHF (EF 60% by echo 2020-admitted for exacerbation) who presented to [facility] ER c/o worsening C/P x 1 day found to have 1 mm ST elevations in inferior leads with reciprocal changes in lateral leads. STEMI code called and cancelled as patient remained chest pain free in the ER and bedside echo revealed no wall motion abnormalities. Patient is now admitted for further management of NSTEMI. 
73M PMH obesity, HTN, Hyperlipidemia, DM Type II, CAD s/p multiple PCIs (05/24/2005 PCI to mLAD x2, pLAD and 04/18/2005 PCI to mid/prox/distal RCA, OM2), and CABG x 2 in 2017 (LIMA-LAD reverse SVG- PDA), CKD Stage III (Baseline Cr 1.4), previous Covid infection, Chronic Diastolic CHF (EF 60% by echo 2020-admitted for exacerbation) who presented to [facility] ER c/o worsening C/P x 1 day found to have 1 mm ST elevations in inferior leads with reciprocal changes in lateral leads. STEMI code called and cancelled as patient remained chest pain free in the ER and bedside echo revealed no wall motion abnormalities. Patient is now admitted for further management of NSTEMI. 
73M PMH obesity, HTN, Hyperlipidemia, DM Type II, CAD s/p multiple PCIs (05/24/2005 PCI to mLAD x2, pLAD and 04/18/2005 PCI to mid/prox/distal RCA, OM2), and CABG x 2 in 2017 (LIMA-LAD reverse SVG- PDA), CKD Stage III (Baseline Cr 1.4), previous Covid infection, Chronic Diastolic CHF (EF 60% by echo 2020-admitted for exacerbation) who presented to [facility] ER c/o worsening C/P x 1 day found to have 1 mm ST elevations in inferior leads with reciprocal changes in lateral leads. STEMI code called and cancelled as patient remained chest pain free in the ER and bedside echo revealed no wall motion abnormalities. Patient is now admitted for further management of NSTEMI.

## 2023-06-06 NOTE — PROGRESS NOTE ADULT - PROBLEM SELECTOR PLAN 1
Patient currently C/P free and HD stable . Hx of CAD with multiple PCIs with subsequent 2V CABG 2017 LIMA-LAD reverse SVG- PDA), presenting with exertional C/P x 1 week worsening x 1 day   -Initial EKG NSR at 75 bpm with 1 mm ST elevation inferior leads (III, AVF) with 1 mm ST depressions I, AVL (new compared to EKG 2020). STEMI code called by ER. Interventionalist reviewed EKG. Stat bedside echo revealed no wall motion abnormalities. Code STEMI cancelled.  Went for cath on 6/4 AM: : dLM into pLCx (90% severely calcified), OM1 mild dz, pLAD mod dz, mLAD 100% , D1 mild-mod dz, pRCA 100% ; Grafts: patent LIMA-LAD w/ large branch from LIMA to chest wall, patent SVG-RPDA w/ collaterals to LAD; HAMMAD 12mmHg, L radial access. Repeat cath with ROTA approach today.    - restart heparin gtt post cath pending cath recs  - Plavix loaded with 600mg, c/w 75mg daily  - C/w aspirin 81mg daily  - C/w lipitor 40mg  - repeat EKG

## 2023-06-06 NOTE — CHART NOTE - NSCHARTNOTEFT_GEN_A_CORE
Interventional Cardiology PA Precath Note      HPI: 73M PMH obesity, HTN, Hyperlipidemia, DM Type II, CAD s/p multiple PCIs (2005 PCI to mLAD x2, pLAD and 2005 PCI to mid/prox/distal RCA, OM2), and CABG x 2 in 2017 (LIMA-LAD reverse SVG- PDA), CKD Stage III (Baseline Cr 1.5-1.7 per labs from 2022-2022), previous Covid infection, Chronic Diastolic CHF (EF 60% by echo -admitted for exacerbation) who presented to [facility] ER c/o worsening C/P x 1 day found to have 1 mm ST elevations in inferior leads with reciprocal changes in lateral leads. STEMI code called and cancelled as patient remained chest pain free in the ER and bedside echo revealed no wall motion abnormalities. Patient went for cardiac cath 2023 which revealed dLM into pLCx 90% (severely calcified), plan to stage with PCI and ROTA atherectomy. Course complicated by development of new AFlutter, now in NSR but on hep gtt.    Anginal class 3    Radiology:   X-ray: CXR without edema/effusions/consolidates  Ultrasound/ECHO - TTE showing EF 60%, mild LVH, mod dilated LA, no other significant abnormalities  Prior Cath Hx: dLM into pLCx (90% severely calcified), OM1 mild dz, pLAD mod dz, mLAD 100% , D1 mild-mod dz, pRCA 100% ; Grafts: patent LIMA-LAD w/ large branch from LIMA to chest wall, patent SVG-RPDA w/ collaterals to LAD; HAMMAD 12mmHg, L radial access.    PMH:  as above, HTN, HLD, DM, CAD s/p CABG, CKD (Cr 1.5-1.7 per labs from -, currently at baseline)  PSH:  CABG  ALLERGIES: No Known Allergies      Shellfish/Contrast Dye Allergies?  No  FHx:   MEDS:   ascorbic acid 500 milliGRAM(s) Oral daily  aspirin enteric coated 81 milliGRAM(s) Oral daily  atorvastatin 40 milliGRAM(s) Oral at bedtime  cholecalciferol 2000 Unit(s) Oral daily  clopidogrel Tablet 75 milliGRAM(s) Oral daily  dextrose 5%. 1000 milliLiter(s) IV Continuous <Continuous>  dextrose 5%. 1000 milliLiter(s) IV Continuous <Continuous>  dextrose 50% Injectable 12.5 Gram(s) IV Push once  dextrose 50% Injectable 25 Gram(s) IV Push once  dextrose 50% Injectable 25 Gram(s) IV Push once  dextrose Oral Gel 15 Gram(s) Oral once PRN  glucagon  Injectable 1 milliGRAM(s) IntraMuscular once  heparin  Infusion 900 Unit(s)/Hr IV Continuous <Continuous>  insulin lispro (ADMELOG) corrective regimen sliding scale   SubCutaneous Before meals and at bedtime  isosorbide   mononitrate ER Tablet (IMDUR) 30 milliGRAM(s) Oral every 24 hours  senna 2 Tablet(s) Oral at bedtime  sodium chloride 0.9%. 1000 milliLiter(s) IV Continuous <Continuous>    T(C): 36.9 (23 @ 05:25), Max: 37.3 (23 @ 09:41)  HR: 77 (23 @ 04:40) (72 - 81)  BP: 126/59 (23 @ 04:40) (117/57 - 161/68)  RR: 18 (23 @ 04:40) (18 - 18)  SpO2: 96% (23 @ 04:40) (95% - 96%)  Wt(kg): --  HEENT: NCAT, EOMI, PERRLA  NECK: No carotid bruits B/L, +2 Carotid pulses B/L  PULM:  CTA B/L No W/R/R  CARD: RRR, +S1, +S2, No M/R/G  ABD: ND, +BS, NT, no masses  EXT: Warm, 2+ pitting edema in bilateral lower extremities  NEURO: A & O x 3, no focal neurologic deficits  PULSES:	   R	   2+             FEM         	1+ bilaterally                                 DP/PT both dopplerable bilaterally  Right		                                YES - has right sided femoral bruit    Left		                                        NO bruit                                11.3   4.37  )-----------( 156      ( 2023 05:30 )             34.4         137  |  104  |  40<H>  ----------------------------<  131<H>  4.3   |  21<L>  |  1.78<H>    Ca    7.8<L>      2023 05:30  Phos  3.8       Mg     2.2         TPro  6.1  /  Alb  3.6  /  TBili  0.5  /  DBili  x   /  AST  37  /  ALT  28  /  AlkPhos  58            Activated Partial Thromboplastin Time: 87.6 sec (23 @ 05:30)  Prothrombin Time, Plasma: 11.7 sec (23 @ 05:30)  INR: 0.98 (23 @ 05:30)  Activated Partial Thromboplastin Time: 71.4 sec (23 @ 21:24)  Prothrombin Time, Plasma: 11.5 sec (23 @ 05:30)  INR: 0.97 (23 @ 05:30)  Activated Partial Thromboplastin Time: 31.6 sec (23 @ 05:30)  Prothrombin Time, Plasma: 11.7 sec (23 @ 12:47)  INR: 0.98 (23 @ 12:47)  Activated Partial Thromboplastin Time: 42.2 sec (23 @ 12:47)  Activated Partial Thromboplastin Time: 60.2 sec (23 @ 06:48)  Activated Partial Thromboplastin Time: 54.5 sec (23 @ 22:51)  Activated Partial Thromboplastin Time: 67.4 sec (23 @ 18:29)  Activated Partial Thromboplastin Time: 71.7 sec (23 @ 16:44)  Activated Partial Thromboplastin Time: 84.0 sec (23 @ 10:00)      EKDHB (), sinus 70 BPM, no TWI, significant MALAIKA/STD					  ASA III         Mallampati class: III              Anginal Class: III (with minimal exertion - walking)    A/P:  73M PMH obesity, HTN, Hyperlipidemia, DM Type II, CAD s/p multiple PCIs (2005 PCI to mLAD x2, pLAD and 2005 PCI to mid/prox/distal RCA, OM2), and CABG x 2 in 2017 (LIMA-LAD reverse SVG- PDA), CKD Stage III (Baseline Cr 1.5-1.7 per labs from 2022-2022), previous Covid infection, Chronic Diastolic CHF (EF 60% by echo -admitted for exacerbation) who presented to [facility] ER c/o worsening C/P x 1 day found to have 1 mm ST elevations in inferior leads with reciprocal changes in lateral leads. STEMI code called and cancelled as patient remained chest pain free in the ER and bedside echo revealed no wall motion abnormalities. Patient went for cardiac cath 2023 which revealed dLM into pLCx 90% (severely calcified), plan to stage with PCI and ROTA atherectomy. Course complicated by development of new AFlutter, now in NSR but on hep gtt.      Sedation Plan:   [  ] None   [X ] Moderate   [  ]  Deep    [  ]  General Anesthesia   Patient Is Suitable Candidate For Sedation?     [ X ] Yes   [  ] No   [  ] Not Applicable   Cath Order Entered: [ X ] Yes  DAPT LOAD Ordered: [X  ] Yes  [  ] No load  ________  Pre-Cath fluids Ordered: [ X ] Yes  [  ] Not indicated  _________    Risks Benefits Annotation:  CARDIAC CATH: Risks & benefits of procedure and sedation and risks and benefits for the alternative therapy have been explained to the patient and/or HCP in layman’s terms including but not limited to: allergic reaction, bleeding, infection, arrhythmia, respiratory compromise, renal and vascular compromise, limb damage, MI, CVA, emergent CABG/Vascular Surgery and death. Informed consent obtained and in chart.

## 2023-06-06 NOTE — PROGRESS NOTE ADULT - PROBLEM SELECTOR PLAN 4
LDL 63  - C/w lipitor 40mg daily
A1c 8.2.  -Hold oral antihyperglycemics. Moderate dose sliding scale
A1c 8.2.  -Hold oral antihyperglycemics. Moderate dose sliding scale
LDL 63  - C/w lipitor 40mg daily

## 2023-06-06 NOTE — PROGRESS NOTE ADULT - PROBLEM SELECTOR PROBLEM 7
Prophylactic measure
Prophylactic measure
Prolonged VA interval present on electrocardiogram
Prolonged NM interval present on electrocardiogram

## 2023-06-06 NOTE — PROGRESS NOTE ADULT - PROBLEM SELECTOR PLAN 3
-SBP 150s-170s  Plan:  - hold losartan 25mg BID for now pending cath tentatively on 6/6  - started imdur 30 on 6/4 for HTN and anti-anginal effects  - If patient's Cr worsens, consider switching to amlodipine vs. nifedipine  - Holding home metoprolol given prolonged VT interval.
LDL 63  - C/w lipitor 40mg daily
LDL 63  - C/w lipitor 40mg daily
-SBP 150s-170s    - hold losartan 25mg BID for now pending cath tentatively on 6/6, restart as needed  - started imdur 30 on 6/4 for HTN and anti-anginal effects  - If patient's Cr worsens, consider switching to amlodipine vs. nifedipine  - Holding home metoprolol given prolonged MA interval.

## 2023-06-06 NOTE — PROGRESS NOTE ADULT - PROBLEM SELECTOR PLAN 8
F: None  E: replete as needed  N: Dash diet  GOC: FULL CODE  DVT Prophylaxis: heparin subQ -> heparin gtt today pending cath on 6/6  Dispo: Cardiac tele
F: None  E: replete as needed  N: Dash diet  GOC: FULL CODE  DVT Prophylaxis: restart heparin gtt pending cath lab recs  Dispo: Cardiac tele

## 2023-06-06 NOTE — PROGRESS NOTE ADULT - PROBLEM SELECTOR PLAN 6
AL interval on admission . 2020 EKG revealed AL interval 310. No signs of blocks or bradycardia.    -Holding home Metoprolol   -Continue to monitor telemetry and serial EKGs.
-Baseline Cr 1.8-2.  Cr on admission 1.6  - Cr improving  - Monitor renal function, electrolytes, BP, urine output and volume status.
NC interval on admission . 2020 EKG revealed NC interval 310. No signs of blocks or bradycardia.    -Holding home Metoprolol   -Continue to monitor telemetry and serial EKGs.
-Baseline Cr 1.8-2.  Cr on admission 1.6  - Cr improving  - Monitor renal function, electrolytes, BP, urine output and volume status.

## 2023-06-06 NOTE — PROGRESS NOTE ADULT - PROBLEM SELECTOR PLAN 2
New Atrial flutter noted overnight 6/4 into 6/5. Discussed with Dr. Sharpe on 6/5, no known history of flutter or afib. TTE 6/3/23 showed EF 60%, mild symmetric left ventricular hypertrophy, moderately dilated left atrium.      - restart heparin gtt post cath  - address underlying ischemia, then may be candidate for EP intervention later  - holding BB i/s/o prolonged TN interval

## 2023-06-07 ENCOUNTER — TRANSCRIPTION ENCOUNTER (OUTPATIENT)
Age: 73
End: 2023-06-07

## 2023-06-07 VITALS — TEMPERATURE: 98 F

## 2023-06-07 LAB
ANION GAP SERPL CALC-SCNC: 11 MMOL/L — SIGNIFICANT CHANGE UP (ref 5–17)
APTT BLD: 29.7 SEC — SIGNIFICANT CHANGE UP (ref 27.5–35.5)
BUN SERPL-MCNC: 36 MG/DL — HIGH (ref 7–23)
CALCIUM SERPL-MCNC: 8.2 MG/DL — LOW (ref 8.4–10.5)
CHLORIDE SERPL-SCNC: 109 MMOL/L — HIGH (ref 96–108)
CO2 SERPL-SCNC: 18 MMOL/L — LOW (ref 22–31)
CREAT SERPL-MCNC: 1.59 MG/DL — HIGH (ref 0.5–1.3)
EGFR: 45 ML/MIN/1.73M2 — LOW
GLUCOSE BLDC GLUCOMTR-MCNC: 144 MG/DL — HIGH (ref 70–99)
GLUCOSE BLDC GLUCOMTR-MCNC: 190 MG/DL — HIGH (ref 70–99)
GLUCOSE BLDC GLUCOMTR-MCNC: 199 MG/DL — HIGH (ref 70–99)
GLUCOSE SERPL-MCNC: 163 MG/DL — HIGH (ref 70–99)
HCT VFR BLD CALC: 30.4 % — LOW (ref 39–50)
HGB BLD-MCNC: 9.7 G/DL — LOW (ref 13–17)
INR BLD: 1.02 — SIGNIFICANT CHANGE UP (ref 0.88–1.16)
MAGNESIUM SERPL-MCNC: 2.1 MG/DL — SIGNIFICANT CHANGE UP (ref 1.6–2.6)
MCHC RBC-ENTMCNC: 31.1 PG — SIGNIFICANT CHANGE UP (ref 27–34)
MCHC RBC-ENTMCNC: 31.9 GM/DL — LOW (ref 32–36)
MCV RBC AUTO: 97.4 FL — SIGNIFICANT CHANGE UP (ref 80–100)
NRBC # BLD: 0 /100 WBCS — SIGNIFICANT CHANGE UP (ref 0–0)
PHOSPHATE SERPL-MCNC: 4.2 MG/DL — SIGNIFICANT CHANGE UP (ref 2.5–4.5)
PLATELET # BLD AUTO: 139 K/UL — LOW (ref 150–400)
POTASSIUM SERPL-MCNC: 4.7 MMOL/L — SIGNIFICANT CHANGE UP (ref 3.5–5.3)
POTASSIUM SERPL-SCNC: 4.7 MMOL/L — SIGNIFICANT CHANGE UP (ref 3.5–5.3)
PROTHROM AB SERPL-ACNC: 12.2 SEC — SIGNIFICANT CHANGE UP (ref 10.5–13.4)
RBC # BLD: 3.12 M/UL — LOW (ref 4.2–5.8)
RBC # FLD: 14.8 % — HIGH (ref 10.3–14.5)
SODIUM SERPL-SCNC: 138 MMOL/L — SIGNIFICANT CHANGE UP (ref 135–145)
WBC # BLD: 6.79 K/UL — SIGNIFICANT CHANGE UP (ref 3.8–10.5)
WBC # FLD AUTO: 6.79 K/UL — SIGNIFICANT CHANGE UP (ref 3.8–10.5)

## 2023-06-07 PROCEDURE — C1874: CPT

## 2023-06-07 PROCEDURE — 86850 RBC ANTIBODY SCREEN: CPT

## 2023-06-07 PROCEDURE — C8929: CPT

## 2023-06-07 PROCEDURE — 85347 COAGULATION TIME ACTIVATED: CPT

## 2023-06-07 PROCEDURE — C1724: CPT

## 2023-06-07 PROCEDURE — 99239 HOSP IP/OBS DSCHRG MGMT >30: CPT

## 2023-06-07 PROCEDURE — C1725: CPT

## 2023-06-07 PROCEDURE — 99285 EMERGENCY DEPT VISIT HI MDM: CPT | Mod: 25

## 2023-06-07 PROCEDURE — C1887: CPT

## 2023-06-07 PROCEDURE — 80053 COMPREHEN METABOLIC PANEL: CPT

## 2023-06-07 PROCEDURE — 83735 ASSAY OF MAGNESIUM: CPT

## 2023-06-07 PROCEDURE — 93321 DOPPLER ECHO F-UP/LMTD STD: CPT

## 2023-06-07 PROCEDURE — 84100 ASSAY OF PHOSPHORUS: CPT

## 2023-06-07 PROCEDURE — 85025 COMPLETE CBC W/AUTO DIFF WBC: CPT

## 2023-06-07 PROCEDURE — C1760: CPT

## 2023-06-07 PROCEDURE — 85027 COMPLETE CBC AUTOMATED: CPT

## 2023-06-07 PROCEDURE — 85730 THROMBOPLASTIN TIME PARTIAL: CPT

## 2023-06-07 PROCEDURE — 86901 BLOOD TYPING SEROLOGIC RH(D): CPT

## 2023-06-07 PROCEDURE — 82962 GLUCOSE BLOOD TEST: CPT

## 2023-06-07 PROCEDURE — 71045 X-RAY EXAM CHEST 1 VIEW: CPT

## 2023-06-07 PROCEDURE — C1753: CPT

## 2023-06-07 PROCEDURE — 83036 HEMOGLOBIN GLYCOSYLATED A1C: CPT

## 2023-06-07 PROCEDURE — C1894: CPT

## 2023-06-07 PROCEDURE — 36415 COLL VENOUS BLD VENIPUNCTURE: CPT

## 2023-06-07 PROCEDURE — 80061 LIPID PANEL: CPT

## 2023-06-07 PROCEDURE — 87635 SARS-COV-2 COVID-19 AMP PRB: CPT

## 2023-06-07 PROCEDURE — 80048 BASIC METABOLIC PNL TOTAL CA: CPT

## 2023-06-07 PROCEDURE — C1769: CPT

## 2023-06-07 PROCEDURE — 82550 ASSAY OF CK (CPK): CPT

## 2023-06-07 PROCEDURE — 86900 BLOOD TYPING SEROLOGIC ABO: CPT

## 2023-06-07 PROCEDURE — 85610 PROTHROMBIN TIME: CPT

## 2023-06-07 PROCEDURE — 84484 ASSAY OF TROPONIN QUANT: CPT

## 2023-06-07 PROCEDURE — 82553 CREATINE MB FRACTION: CPT

## 2023-06-07 RX ORDER — APIXABAN 2.5 MG/1
1 TABLET, FILM COATED ORAL
Qty: 60 | Refills: 0
Start: 2023-06-07 | End: 2023-07-06

## 2023-06-07 RX ORDER — EZETIMIBE 10 MG/1
1 TABLET ORAL
Qty: 30 | Refills: 11
Start: 2023-06-07 | End: 2024-05-31

## 2023-06-07 RX ORDER — SENNA PLUS 8.6 MG/1
2 TABLET ORAL
Qty: 0 | Refills: 0 | DISCHARGE
Start: 2023-06-07

## 2023-06-07 RX ORDER — EZETIMIBE AND SIMVASTATIN 10; 80 MG/1; MG/1
1 TABLET, FILM COATED ORAL
Qty: 30 | Refills: 0
Start: 2023-06-07 | End: 2023-07-06

## 2023-06-07 RX ORDER — RIVAROXABAN 15 MG-20MG
1 KIT ORAL
Qty: 30 | Refills: 6
Start: 2023-06-07 | End: 2024-01-02

## 2023-06-07 RX ORDER — EZETIMIBE AND SIMVASTATIN 10; 80 MG/1; MG/1
1 TABLET, FILM COATED ORAL
Refills: 0 | DISCHARGE

## 2023-06-07 RX ORDER — ISOSORBIDE MONONITRATE 60 MG/1
1 TABLET, EXTENDED RELEASE ORAL
Qty: 30 | Refills: 6
Start: 2023-06-07

## 2023-06-07 RX ORDER — RIVAROXABAN 15 MG-20MG
1 KIT ORAL
Qty: 30 | Refills: 0
Start: 2023-06-07 | End: 2023-07-06

## 2023-06-07 RX ORDER — FUROSEMIDE 40 MG
1 TABLET ORAL
Refills: 0 | DISCHARGE

## 2023-06-07 RX ORDER — CLOPIDOGREL BISULFATE 75 MG/1
1 TABLET, FILM COATED ORAL
Qty: 30 | Refills: 11
Start: 2023-06-07 | End: 2024-05-31

## 2023-06-07 RX ORDER — LOSARTAN POTASSIUM 100 MG/1
25 TABLET, FILM COATED ORAL EVERY 12 HOURS
Refills: 0 | Status: DISCONTINUED | OUTPATIENT
Start: 2023-06-07 | End: 2023-06-07

## 2023-06-07 RX ORDER — ATORVASTATIN CALCIUM 80 MG/1
1 TABLET, FILM COATED ORAL
Qty: 30 | Refills: 11
Start: 2023-06-07 | End: 2024-05-31

## 2023-06-07 RX ORDER — ISOSORBIDE MONONITRATE 60 MG/1
1 TABLET, EXTENDED RELEASE ORAL
Qty: 30 | Refills: 0
Start: 2023-06-07

## 2023-06-07 RX ORDER — ASPIRIN/CALCIUM CARB/MAGNESIUM 324 MG
1 TABLET ORAL
Qty: 30 | Refills: 11
Start: 2023-06-07 | End: 2024-05-31

## 2023-06-07 RX ORDER — RIVAROXABAN 15 MG-20MG
15 KIT ORAL DAILY
Refills: 0 | Status: DISCONTINUED | OUTPATIENT
Start: 2023-06-07 | End: 2023-06-07

## 2023-06-07 RX ADMIN — Medication 500 MILLIGRAM(S): at 10:08

## 2023-06-07 RX ADMIN — Medication 81 MILLIGRAM(S): at 10:09

## 2023-06-07 RX ADMIN — Medication 2000 UNIT(S): at 10:08

## 2023-06-07 RX ADMIN — Medication 2: at 11:43

## 2023-06-07 RX ADMIN — CLOPIDOGREL BISULFATE 75 MILLIGRAM(S): 75 TABLET, FILM COATED ORAL at 10:09

## 2023-06-07 RX ADMIN — ISOSORBIDE MONONITRATE 30 MILLIGRAM(S): 60 TABLET, EXTENDED RELEASE ORAL at 10:09

## 2023-06-07 RX ADMIN — LOSARTAN POTASSIUM 25 MILLIGRAM(S): 100 TABLET, FILM COATED ORAL at 12:41

## 2023-06-07 NOTE — DISCHARGE NOTE NURSING/CASE MANAGEMENT/SOCIAL WORK - NSDCPEFALRISK_GEN_ALL_CORE
For information on Fall & Injury Prevention, visit: [email or web address] OR  [email or web address] OR  https://www.cdc.gov/steadi/patient.html

## 2023-06-07 NOTE — DISCHARGE NOTE PROVIDER - NSDCFUSCHEDAPPT_GEN_ALL_CORE_FT
Kristofer Sharpe  [facility] Physician [facility]  [facility] [address]  Scheduled Appointment: 06/19/2023     Kristofer Sharpe  [facility] Physician [facility]  [facility] [address]  Scheduled Appointment: 06/15/2023

## 2023-06-07 NOTE — DISCHARGE NOTE PROVIDER - NSDCFUADDAPPT_GEN_ALL_CORE_FT
(1) Please followup with your cardiologist Dr. Sharpe on June 19th at 12:20pm. His office may call you for a sooner appointment. Please call [phone] with any questions. (1) Please followup with your cardiologist Dr. Sharpe on June 19th at 12:20pm. His office may call you for a sooner appointment. Please call [phone] with any questions.    (2) Please followup with your interventionalist Dr. Ca on July 3rd at 11am. His office may call you for a sooner appointment. Please call [phone] (1) Please followup with your cardiologist Dr. Sharpe on June 15th at 12:00pm. His office may call you for a sooner appointment. Please call [phone] with any questions.    (2) Please followup with your interventionalist Dr. Ca on July 3rd at 11am. His office may call you for a sooner appointment. Please call [phone]

## 2023-06-07 NOTE — DISCHARGE NOTE NURSING/CASE MANAGEMENT/SOCIAL WORK - PATIENT PORTAL LINK FT
You can access the FollowMyHealth Patient Portal offered by [facility] by registering at the following website: [email or web address]. By joining [employer]’s FollowMyHealth portal, you will also be able to view your health information using other applications (apps) compatible with our system.

## 2023-06-07 NOTE — DISCHARGE NOTE PROVIDER - HOSPITAL COURSE
#Discharge: do not delete    Patient    Problem List/Main Diagnoses (system-based):     Inpatient treatment course:     Patient was discharged to:    New medications:     Changes to old medications:     Medications that were stopped:    Items to Follow up as outpatient:    Physical exam at time of discharge: #Discharge: do not delete    73 obese M with PMHx HTN, Hyperlipidemia, DM Type II, CAD s/p multiple PCIs (05/24/2005 PCI to mLAD x2, pLAD and 04/18/2005 PCI to mid/prox/distal RCA, OM2), and CABG x 2 in 2017 (LIMA-LAD reverse SVG- PDA), CKD Stage III (Baseline Cr 1.8-2), previous Covid infection, Chronic Diastolic CHF (EF 60% by echo 2020-admitted for exacerbation) who presented to [facility] with worsening exertional chest pain, s/p PCI with rota and AUDRA x OM1, AUDRA x  dLM and pLcx.    Problem List/Main Diagnoses (system-based):     NSTEMI (non-ST elevation myocardial infarction).   Hx of CAD with multiple PCIs with subsequent 2V CABG 2017 LIMA-LAD reverse SVG- PDA)  Presenting with exertional chest pain.   -Initial EKG NSR at 75 bpm with 1 mm ST elevation inferior leads (III, AVF) with 1 mm ST depressions I, AVL (new compared to EKG 2020).  Went for cath on 6/4 AM: : dLM into pLCx (90% severely calcified), OM1 mild dz, pLAD mod dz, mLAD 100% , D1 mild-mod dz, pRCA 100% ; Grafts: patent LIMA-LAD w/ large branch from LIMA to chest wall, patent SVG-RPDA w/ collaterals to LAD; HAMMAD 12mmHg, L radial access.  Repeat Cath with rota/PCI on 6/6 with rota/PCI OM1 with AUDRA x OM1, AUDRA x  dLM and dLcx   - On discharge, take plavix 75mg daily, aspirin 81mg daily, and xarelto 15mg daily for 2 weeks (until 6/21). Afterwards, stop taking aspirin and continue taking plavix and xarelto.   - C/w lipitor 40mg    #Atrial flutter  New Atrial flutter noted overnight 6/4 into 6/5. Discussed with Dr. Sharpe on 6/5, no known history of flutter or afib. TTE 6/3/23 showed EF 60%, mild symmetric left ventricular hypertrophy, moderately dilated left atrium.  - address underlying ischemia, then may be candidate for EP intervention later  - Start taking xarelto 15mg once a day with dinner  - HOLD metoprolol given 1st degree AV block.    #First degree AV block  Pt noted to have prolonged KS interval approx 360ms.   - HOLD home metoprolol given 1st degree AV block.    #Hypertension.  SBP 150s-170s  - Restart home losartan 25mg BID  - Continue taking imdur 30mg daily    #Hyperlipidemia.   LDL 63  - C/w lipitor 40mg daily.    #Diabetes mellitus, type 2.   A1c 8.2.  - Resume glipizide on discharge.    #CKD (chronic kidney disease) stage 3, GFR 30-59 ml/min.   -Baseline Cr 1.8-2. At baseline    Patient was discharged to: Home    New medications: Plavix 75mg daily, Xarelto 15mg once daily with dinner, imdur 30mg daily    Changes to old medications: None    Medications that were stopped: Amlodipine, metoprolol    Items to Follow up as outpatient: Groin cath site, BPs, CBC, CMP    Physical exam at time of discharge:   Constitutional: NAD  HEENT: EOMI, sclera non-icteric  Respiratory: CTA b/l, good air entry b/l, no wheezing, no rhonchi, no rales, without accessory muscle use and no intercostal retractions  Cardiovascular: RRR, normal S1S2, no M/R/G  Gastrointestinal: abdomen soft, obese, NTND  Extremities: Warm, well perfused, 2+ edema to knees  Neurological: AAOx3 #Discharge: do not delete    73 obese M with PMHx HTN, Hyperlipidemia, DM Type II, CAD s/p multiple PCIs (05/24/2005 PCI to mLAD x2, pLAD and 04/18/2005 PCI to mid/prox/distal RCA, OM2), and CABG x 2 in 2017 (LIMA-LAD reverse SVG- PDA), CKD Stage III (Baseline Cr 1.8-2), previous Covid infection, Chronic Diastolic CHF (EF 60% by echo 2020-admitted for exacerbation) who presented to [facility] with worsening exertional chest pain, s/p PCI with rota and AUDRA x OM1, AUDRA x  dLM and pLcx.    Problem List/Main Diagnoses (system-based):     NSTEMI (non-ST elevation myocardial infarction).   Hx of CAD with multiple PCIs with subsequent 2V CABG 2017 LIMA-LAD reverse SVG- PDA)  Presenting with exertional chest pain.   -Initial EKG NSR at 75 bpm with 1 mm ST elevation inferior leads (III, AVF) with 1 mm ST depressions I, AVL (new compared to EKG 2020).  Went for cath on 6/4 AM: : dLM into pLCx (90% severely calcified), OM1 mild dz, pLAD mod dz, mLAD 100% , D1 mild-mod dz, pRCA 100% ; Grafts: patent LIMA-LAD w/ large branch from LIMA to chest wall, patent SVG-RPDA w/ collaterals to LAD; HAMMAD 12mmHg, L radial access.  Repeat Cath with rota/PCI on 6/6 with rota/PCI OM1 with AUDRA x OM1, AUDRA x  dLM and dLcx   - On discharge, take plavix 75mg daily, aspirin 81mg daily, and xarelto 15mg daily for 2 weeks (until 6/21). Afterwards, stop taking aspirin and continue taking plavix and xarelto.   - C/w home med vytorin 10-80    #Atrial flutter  New Atrial flutter noted overnight 6/4 into 6/5. Discussed with Dr. Sharpe on 6/5, no known history of flutter or afib. TTE 6/3/23 showed EF 60%, mild symmetric left ventricular hypertrophy, moderately dilated left atrium.  - address underlying ischemia, then may be candidate for EP intervention later  - Start taking xarelto 15mg once a day with dinner  - HOLD metoprolol given 1st degree AV block.    #First degree AV block  Pt noted to have prolonged NV interval approx 360ms.   - HOLD home metoprolol given 1st degree AV block.    #Hypertension.  SBP 150s-170s  - Restart home losartan 25mg BID  - Continue taking imdur 30mg daily    #Hyperlipidemia.   LDL 63  - C/w home med Vytorin 10-80.    #Diabetes mellitus, type 2.   A1c 8.2.  - Resume glipizide and Actos on discharge.    #CKD (chronic kidney disease) stage 3, GFR 30-59 ml/min.   -Baseline Cr 1.8-2. At baseline    Patient was discharged to: Home    New medications: Plavix 75mg daily, Xarelto 15mg once daily with dinner, imdur 30mg daily    Changes to old medications: None    Medications that were stopped: Amlodipine, metoprolol, lasix    Items to Follow up as outpatient: Groin cath site, BPs, CBC, CMP    Physical exam at time of discharge:   Constitutional: NAD  HEENT: EOMI, sclera non-icteric  Respiratory: CTA b/l, good air entry b/l, no wheezing, no rhonchi, no rales, without accessory muscle use and no intercostal retractions  Cardiovascular: RRR, normal S1S2, no M/R/G  Gastrointestinal: abdomen soft, obese, NTND  Extremities: Warm, well perfused, 2+ edema to knees  Neurological: AAOx3

## 2023-06-07 NOTE — DISCHARGE NOTE PROVIDER - NSDCMRMEDTOKEN_GEN_ALL_CORE_FT
Actos 45 mg oral tablet: 1 tab(s) orally once a day  aspirin 81 mg oral tablet: 1 tab(s) orally once a day  cholecalciferol 50 mcg (2000 intl units) oral tablet: 1 tab(s) orally once a day  glipiZIDE 5 mg oral tablet: 1 tab(s) orally 3 times a day  Lasix 40 mg oral tablet: 1 tab(s) orally once a day  losartan 25 mg oral tablet: 1 tab(s) orally once a day  metoprolol tartrate 25 mg oral tablet: 1 tab(s) orally 2 times a day  Vitamin C 500 mg oral capsule: 1 cap(s) orally once a day  Vytorin 10 mg-80 mg oral tablet: 1 tab(s) orally once a day   Actos 45 mg oral tablet: 1 tab(s) orally once a day  aspirin 81 mg oral tablet: 1 tab(s) orally once a day  cholecalciferol 50 mcg (2000 intl units) oral tablet: 1 tab(s) orally once a day  Eliquis 5 mg oral tablet: 1 tab(s) orally 2 times a day  glipiZIDE 5 mg oral tablet: 1 tab(s) orally 3 times a day  Lasix 40 mg oral tablet: 1 tab(s) orally once a day  losartan 25 mg oral tablet: 1 tab(s) orally once a day  metoprolol tartrate 25 mg oral tablet: 1 tab(s) orally 2 times a day  Vitamin C 500 mg oral capsule: 1 cap(s) orally once a day  Vytorin 10 mg-80 mg oral tablet: 1 tab(s) orally once a day   Actos 45 mg oral tablet: 1 tab(s) orally once a day  aspirin 81 mg oral tablet: 1 tab(s) orally once a day  cholecalciferol 50 mcg (2000 intl units) oral tablet: 1 tab(s) orally once a day  glipiZIDE 5 mg oral tablet: 1 tab(s) orally 3 times a day  Lasix 40 mg oral tablet: 1 tab(s) orally once a day  losartan 25 mg oral tablet: 1 tab(s) orally once a day  metoprolol tartrate 25 mg oral tablet: 1 tab(s) orally 2 times a day  Vitamin C 500 mg oral capsule: 1 cap(s) orally once a day  Vytorin 10 mg-80 mg oral tablet: 1 tab(s) orally once a day  Xarelto 15 mg oral tablet: 1 tab(s) orally once a day   Actos 45 mg oral tablet: 1 tab(s) orally once a day  aspirin 81 mg oral tablet: 1 tab(s) orally once a day  cholecalciferol 50 mcg (2000 intl units) oral tablet: 1 tab(s) orally once a day  clopidogrel 75 mg oral tablet: 1 tab(s) orally once a day  glipiZIDE 5 mg oral tablet: 1 tab(s) orally 3 times a day  isosorbide mononitrate 30 mg oral tablet, extended release: 1 tab(s) orally every 24 hours  Lasix 40 mg oral tablet: 1 tab(s) orally once a day  losartan 25 mg oral tablet: 1 tab(s) orally once a day  senna leaf extract oral tablet: 2 tab(s) orally once a day (at bedtime)  Vitamin C 500 mg oral capsule: 1 cap(s) orally once a day  Vytorin 10 mg-80 mg oral tablet: 1 tab(s) orally once a day  Xarelto 15 mg oral tablet: 1 tab(s) orally once a day   Actos 45 mg oral tablet: 1 tab(s) orally once a day  aspirin 81 mg oral tablet: 1 tab(s) orally once a day  cholecalciferol 50 mcg (2000 intl units) oral tablet: 1 tab(s) orally once a day  clopidogrel 75 mg oral tablet: 1 tab(s) orally once a day  ezetimibe-simvastatin 10 mg-80 mg oral tablet: 1 tab(s) orally once a day  glipiZIDE 5 mg oral tablet: 1 tab(s) orally 3 times a day  isosorbide mononitrate 30 mg oral tablet, extended release: 1 tab(s) orally every 24 hours  losartan 25 mg oral tablet: 1 tab(s) orally 2 times a day  senna leaf extract oral tablet: 2 tab(s) orally once a day (at bedtime)  Vitamin C 500 mg oral capsule: 1 cap(s) orally once a day  Xarelto 15 mg oral tablet: 1 tab(s) orally once a day   Actos 45 mg oral tablet: 1 tab(s) orally once a day  aspirin 81 mg oral tablet: 1 tab(s) orally once a day  Cardiac Rehab 3days/week x12 weeks for CAD s/p PCI: See above  cholecalciferol 50 mcg (2000 intl units) oral tablet: 1 tab(s) orally once a day  clopidogrel 75 mg oral tablet: 1 tab(s) orally once a day  ezetimibe-simvastatin 10 mg-80 mg oral tablet: 1 tab(s) orally once a day  glipiZIDE 5 mg oral tablet: 1 tab(s) orally 3 times a day  isosorbide mononitrate 30 mg oral tablet, extended release: 1 tab(s) orally every 24 hours  losartan 25 mg oral tablet: 1 tab(s) orally 2 times a day  senna leaf extract oral tablet: 2 tab(s) orally once a day (at bedtime)  Vitamin C 500 mg oral capsule: 1 cap(s) orally once a day  Xarelto 15 mg oral tablet: 1 tab(s) orally once a day

## 2023-06-07 NOTE — DISCHARGE NOTE PROVIDER - PROVIDER TOKENS
PROVIDER:[TOKEN:[[id number]],FOLLOWUP:[2 weeks],ESTABLISHEDPATIENT:[T]] PROVIDER:[TOKEN:[[id number]],SCHEDULEDAPPT:[06/19/2023],SCHEDULEDAPPTTIME:[12:20 AM],ESTABLISHEDPATIENT:[T]] PROVIDER:[TOKEN:[[id number]],SCHEDULEDAPPT:[06/19/2023],SCHEDULEDAPPTTIME:[12:20 AM],ESTABLISHEDPATIENT:[T]],FREE:[LAST:[Roby Birmingham],FIRST:[Luis],PHONE:[phone]:[(   )    -],ADDRESS:[[address] [place] [address], [address] [place] [address]],SCHEDULEDAPPT:[07/03/2023],SCHEDULEDAPPTTIME:[11:00 AM]] PROVIDER:[TOKEN:[[id number]],SCHEDULEDAPPT:[06/15/2023],SCHEDULEDAPPTTIME:[12:00 PM],ESTABLISHEDPATIENT:[T]],FREE:[LAST:[Roby Birmingham],FIRST:[Luis],PHONE:[phone]],FAX:[(   )    -],ADDRESS:[[address] [place] [address], [address] [place] [address]],SCHEDULEDAPPT:[07/03/2023],SCHEDULEDAPPTTIME:[11:00 AM]]

## 2023-06-07 NOTE — DISCHARGE NOTE PROVIDER - CARE PROVIDER_API CALL
Kristofer Sharpe S  Cardiovascular Disease  [address], [address]  [place], [place] [facility]  Phone: [phone]  Fax: [phone]  Established Patient  Follow Up Time: 2 weeks   Kristofer Sharpe S  Cardiovascular Disease  [address], [address]  [place], [place] [address]  Phone: [phone]  Fax: [phone]  Established Patient  Scheduled Appointment: 06/19/2023 12:20 AM   Kristofer Sharpe S  Cardiovascular Disease  [address], [address]  [place] [address]  Phone: [phone]  Fax: [phone]  Established Patient  Scheduled Appointment: 06/19/2023 12:20 AM    Luis Kaiser  [address] [facility], [address] [place] [address]  Phone: [phone]  Fax: (   )    -  Scheduled Appointment: 07/03/2023 11:00 AM   Kristofer Sharpe S  Cardiovascular Disease  [address], [address]  [place] [address]  Phone: [phone]  Fax: [phone]  Established Patient  Scheduled Appointment: 06/15/2023 12:00 PM    Luis Kaiser  [address] [place] [address], [address] [place] [address]  Phone: [phone]  Fax: (   )    -  Scheduled Appointment: 07/03/2023 11:00 AM

## 2023-06-07 NOTE — DISCHARGE NOTE PROVIDER - NSDCCPCAREPLAN_GEN_ALL_CORE_FT
PRINCIPAL DISCHARGE DIAGNOSIS  Diagnosis: NSTEMI (non-ST elevation myocardial infarction)  Assessment and Plan of Treatment:       SECONDARY DISCHARGE DIAGNOSES  Diagnosis: Prolonged MI interval present on electrocardiogram  Assessment and Plan of Treatment:     Diagnosis: Atrial flutter  Assessment and Plan of Treatment:      PRINCIPAL DISCHARGE DIAGNOSIS  Diagnosis: NSTEMI (non-ST elevation myocardial infarction)  Assessment and Plan of Treatment: You underwent a cardiac catheterization on (6/6/23) and the blockages in your left main, left circumflex, and OM Coronary Artery were opened with Angioplasty/stent placement.  NEVER MISS A DOSE OF ASPIRIN and PLAVIX/Brilinta/Effient; IF YOU DO, YOU ARE AT RISK OF YOUR ANGIOPLASTY SITE and/or STENT CLOSING AND HAVING A HEART ATTACK. DO NOT STOP THESE TWO MEDICATIONS UNLESS INSTRUCTED TO DO SO BY YOUR CARDIOLOGIST. Your procedure was done through your wrist/groin.  You may shower but Avoid tub baths, hot tubs or swimming for 5 days to prevent infection.  You do not need to keep this area covered. You should wait 3 days before returning to ordinary activities. Do not drive for 2 days. Do not lift more than 5 pounds with affected arm for 3 days or more than 10 pounds if groin access for 5 days.  If you develop any swelling, bleeding, hardening of the skin (hematoma formation), acute pain, numbness/tingling  in your arm/groin please contact your doctor immediately or call our 24/7 line: [phone] or go to nearest Emergency Room. Please return to the hospital/seek immediate medical attention if you have worsening symptoms of chest pain, shortness of breath.   Please follow up with your Cardiologist Dr. Nunes in 1-2 weeks, call the office and make an appointment. All of your prescriptions have been sent electronically to your pharmacy.  Please continue a heart healthy Plant based Mediterranean Diet low in sodium, cholesterol, and fat.      SECONDARY DISCHARGE DIAGNOSES  Diagnosis: S/P coronary artery stent placement  Assessment and Plan of Treatment: You have been given a Stent Card to carry with you in your wallet.  Make Photocopies or take a picture of card so you have a backup copy in event the Original card is lost.  This card has important information for any possible future Radiology/MRI studies.    We have provided you with a prescription for cardiac rehab which is medically supervised exercise program for your heart and has been shown to improve the quantity and quality of life of people with heart disease like yours. You should attend cardiac rehab 3 times per week for 12 weeks. We have provided you with a list of nearby facilities. Please call your insurance carrier to determine which of these facilities are covered under your plan. Please bring this prescription with you to your follow up appointment with your cardiologist who can then further assist you to enroll into a cardiac rehab program.       Diagnosis: Atrial flutter  Assessment and Plan of Treatment: Atrial flutter is a type of abnormal heart rhythm (arrhythmia). The heart has an electrical system that tells it how to beat. In atrial flutter, the signals move rapidly in the top chambers of the heart (the atria). This makes your heart beat very fast. Atrial flutter can come and go, or it can be permanent.  The goal of treatment is to prevent blood clots from forming, control your heart rate, or restore your heartbeat to a normal rhythm. If this condition is not treated, it can cause serious problems, such as a weakened heart muscle (cardiomyopathy) or a stroke.  To prevent strokes, you should take a new medication called Xarelto 15mg once a day with dinner. You should followup with your cardiologist Dr. Sharpe for further management.    Diagnosis: First degree AV block  Assessment and Plan of Treatment: First-degree atrioventricular (AV) block is a condition that causes the electrical signals that travel from the heart's upper chambers (atria) to its lower chambers (ventricles) to move too slowly. As a result, the heart may beat more slowly than normal.  First-degree AV block is the least serious type of heart block. Second- and third-degree AV blocks are more serious. First-degree AV block can increase your risk of developing a type of irregular heartbeat called atrial fibrillation. It is also associated with a higher risk of needing a pacemaker in the future.  You should STOP taking your metoprolol medication, as this medication can worsen the first degree AV block.     PRINCIPAL DISCHARGE DIAGNOSIS  Diagnosis: NSTEMI (non-ST elevation myocardial infarction)  Assessment and Plan of Treatment: You underwent a cardiac catheterization on (6/6/23) and the blockages in your left main, left circumflex, and OM Coronary Artery were opened with Angioplasty/stent placement.  NEVER MISS A DOSE OF ASPIRIN and PLAVIX/Brilinta/Effient; IF YOU DO, YOU ARE AT RISK OF YOUR ANGIOPLASTY SITE and/or STENT CLOSING AND HAVING A HEART ATTACK. DO NOT STOP THESE TWO MEDICATIONS UNLESS INSTRUCTED TO DO SO BY YOUR CARDIOLOGIST. Your procedure was done through your wrist/groin.  You may shower but Avoid tub baths, hot tubs or swimming for 5 days to prevent infection.  You do not need to keep this area covered. You should wait 3 days before returning to ordinary activities. Do not drive for 2 days. Do not lift more than 5 pounds with affected arm for 3 days or more than 10 pounds if groin access for 5 days.  If you develop any swelling, bleeding, hardening of the skin (hematoma formation), acute pain, numbness/tingling  in your arm/groin please contact your doctor immediately or call our 24/7 line: [phone] or go to nearest Emergency Room. Please return to the hospital/seek immediate medical attention if you have worsening symptoms of chest pain, shortness of breath.   Please follow up with your Cardiologist Dr. Sharpe on June 19th at 12:20pm. All of your prescriptions have been sent electronically to your pharmacy.  Please continue a heart healthy Plant based Mediterranean Diet low in sodium, cholesterol, and fat.      SECONDARY DISCHARGE DIAGNOSES  Diagnosis: S/P coronary artery stent placement  Assessment and Plan of Treatment: You have been given a Stent Card to carry with you in your wallet.  Make Photocopies or take a picture of card so you have a backup copy in event the Original card is lost.  This card has important information for any possible future Radiology/MRI studies.    We have provided you with a prescription for cardiac rehab which is medically supervised exercise program for your heart and has been shown to improve the quantity and quality of life of people with heart disease like yours. You should attend cardiac rehab 3 times per week for 12 weeks. We have provided you with a list of nearby facilities. Please call your insurance carrier to determine which of these facilities are covered under your plan. Please bring this prescription with you to your follow up appointment with your cardiologist who can then further assist you to enroll into a cardiac rehab program.       Diagnosis: Atrial flutter  Assessment and Plan of Treatment: Atrial flutter is a type of abnormal heart rhythm (arrhythmia). The heart has an electrical system that tells it how to beat. In atrial flutter, the signals move rapidly in the top chambers of the heart (the atria). This makes your heart beat very fast. Atrial flutter can come and go, or it can be permanent.  The goal of treatment is to prevent blood clots from forming, control your heart rate, or restore your heartbeat to a normal rhythm. If this condition is not treated, it can cause serious problems, such as a weakened heart muscle (cardiomyopathy) or a stroke.  To prevent strokes, you should take a new medication called Xarelto 15mg once a day with dinner. You should followup with your cardiologist Dr. Sharpe for further management.    Diagnosis: First degree AV block  Assessment and Plan of Treatment: First-degree atrioventricular (AV) block is a condition that causes the electrical signals that travel from the heart's upper chambers (atria) to its lower chambers (ventricles) to move too slowly. As a result, the heart may beat more slowly than normal.  First-degree AV block is the least serious type of heart block. Second- and third-degree AV blocks are more serious. First-degree AV block can increase your risk of developing a type of irregular heartbeat called atrial fibrillation. It is also associated with a higher risk of needing a pacemaker in the future.  You should STOP taking your metoprolol medication, as this medication can worsen the first degree AV block.

## 2023-06-09 ENCOUNTER — TRANSCRIPTION ENCOUNTER (OUTPATIENT)
Age: 73
End: 2023-06-09

## 2023-06-12 ENCOUNTER — TRANSCRIPTION ENCOUNTER (OUTPATIENT)
Age: 73
End: 2023-06-12

## 2023-06-13 ENCOUNTER — APPOINTMENT (OUTPATIENT)
Dept: CARE COORDINATION | Facility: HOME HEALTH | Age: 73
End: 2023-06-13
Payer: MEDICARE

## 2023-06-13 DIAGNOSIS — I50.9 HEART FAILURE, UNSPECIFIED: ICD-10-CM

## 2023-06-13 DIAGNOSIS — E11.9 TYPE 2 DIABETES MELLITUS W/OUT COMPLICATIONS: ICD-10-CM

## 2023-06-13 DIAGNOSIS — I21.9 ACUTE MYOCARDIAL INFARCTION, UNSPECIFIED: ICD-10-CM

## 2023-06-13 PROCEDURE — 99495 TRANSJ CARE MGMT MOD F2F 14D: CPT | Mod: 95

## 2023-06-13 RX ORDER — PIOGLITAZONE HYDROCHLORIDE 30 MG/1
30 TABLET ORAL
Qty: 90 | Refills: 0 | Status: COMPLETED | COMMUNITY
Start: 2021-11-29 | End: 2023-06-13

## 2023-06-13 RX ORDER — PIOGLITAZONE 45 MG/1
45 TABLET ORAL
Refills: 0 | Status: ACTIVE | COMMUNITY

## 2023-06-13 RX ORDER — PIOGLITAZONE HYDROCHLORIDE 45 MG/1
45 TABLET ORAL
Qty: 90 | Refills: 0 | Status: COMPLETED | COMMUNITY
Start: 2022-09-21 | End: 2023-06-13

## 2023-06-13 RX ORDER — FUROSEMIDE 40 MG/1
40 TABLET ORAL DAILY
Qty: 90 | Refills: 1 | Status: COMPLETED | COMMUNITY
Start: 2021-09-26 | End: 2023-06-13

## 2023-06-13 RX ORDER — METOPROLOL TARTRATE 25 MG/1
25 TABLET, FILM COATED ORAL
Qty: 180 | Refills: 0 | Status: COMPLETED | COMMUNITY
Start: 2017-08-27 | End: 2023-06-13

## 2023-06-13 RX ORDER — AMLODIPINE BESYLATE 10 MG/1
10 TABLET ORAL
Qty: 90 | Refills: 1 | Status: COMPLETED | COMMUNITY
Start: 2020-11-30 | End: 2023-06-13

## 2023-06-13 NOTE — ASSESSMENT
[FreeTextEntry1] : Patient is a 73 year old male  enrolled in the [employer] program with a history of HTN, Hyperlipidemia, DM Type II, CAD s/p multiple PCIs (05/24/2005 PCI to mLAD x2, pLAD and 04/18/2005 PCI to mid/prox/distal RCA, OM2), and CABG x 2 in 2017 (LIMA-LAD reverse SVG- PDA), CKD Stage III (Baseline Cr 1.8-2), previous Covid infection, Chronic Diastolic CHF (EF 60% by echo 2020-admitted for exacerbation). Patient was admitted from 6/3/23 - 6/7/23 to [facility] for a diagnosis of AMI.  \par \[facility] Hospital chart reviewed and copied as per [facility] Discharge Summary:\par "73 obese M with PMHx HTN, Hyperlipidemia, DM Type II, CAD s/p multiple PCIs (05/24/2005 PCI to mLAD x2, pLAD and 04/18/2005 PCI to mid/prox/distal RCA, OM2), and CABG x 2 in 2017 (LIMA-LAD reverse SVG- PDA), CKD Stage III (Baseline Cr 1.8-2), previous Covid infection, Chronic Diastolic CHF (EF 60% by echo 2020-admitted for exacerbation) who presented to [facility] with worsening exertional chest pain, s/p PCI with rota and AUDRA x OM1, AUDRA x dLM and pLcx. Problem List/Main Diagnoses (system-based): NSTEMI (non-ST elevation myocardial infarction). Hx of CAD with multiple PCIs with subsequent 2V CABG 2017 LIMA-LAD reverse SVG- PDA) Presenting with exertional chest pain. -Initial EKG NSR at 75 bpm with 1 mm ST elevation inferior leads (III, AVF) with 1 mm ST depressions I, AVL (new compared to EKG 2020).Went for cath on 6/4 AM: : dLM into pLCx (90% severely calcified), OM1 mild dz, pLAD mod dz, mLAD 100% , D1 mild-mod dz, pRCA 100% ; Grafts: patent LIMA-LAD w/ large branch from LIMA to chest wall, patent SVG-RPDA w/ collaterals to LAD; HAMMAD 12mmHg, L radial access.Repeat Cath with rota/PCI on 6/6 with rota/PCI OM1 with AUDRA x OM1, AUDRA x dLM and dLcx - On discharge, take plavix 75mg daily, aspirin 81mg daily, and xarelto 15mg daily for 2 weeks (until 6/21). Afterwards, stop taking aspirin and continue taking plavix and xarelto. - C/w home med vytorin 10-80". \par \par Patient observed via tele health and is alert and oriented x 3, in no acute distress. Patient states they are feeling well today. Patient states they are eating and drinking well. Patient reports compliance with medications. States he has follow up with cardiologist this coming Thursday and with the surgeon on 7/3/23. Patient reports mild COTA but denies SOB at rest, chest pain or LE edema. Denies any cough, fever, chills, abdominal pain, palpitations, nausea, vomiting, diarrhea, lightheadedness, dizziness, shortness of breath, or chest pain.\par \par Patient contacted by TCM RN within 48 hours of discharge and d/c instructions reviewed.  Discharge medications were reviewed and reconciled with the current medication list and medications in home. Patient had 48 hour follow up call done by RACHELL Bruner on 6/8/23.

## 2023-06-13 NOTE — PHYSICAL EXAM
[No Acute Distress] : no acute distress [Well Nourished] : well nourished [Well Developed] : well developed [Well-Appearing] : well-appearing [Normal Sclera/Conjunctiva] : normal sclera/conjunctiva [Normal Outer Ear/Nose] : the outer ears and nose were normal in appearance [No JVD] : no jugular venous distention [No Respiratory Distress] : no respiratory distress  [No Edema] : there was no peripheral edema [No Extremity Clubbing/Cyanosis] : no extremity clubbing/cyanosis [Non-distended] : non-distended [No Joint Swelling] : no joint swelling [No Rash] : no rash [Normal Gait] : normal gait [Normal Affect] : the affect was normal [Normal Insight/Judgement] : insight and judgment were intact [de-identified] : limited due to tele visit

## 2023-06-13 NOTE — REVIEW OF SYSTEMS
[Fever] : no fever [Chills] : no chills [Fatigue] : no fatigue [Hot Flashes] : no hot flashes [Night Sweats] : no night sweats [Chest Pain] : no chest pain [Palpitations] : no palpitations [Claudication] : no  leg claudication [Lower Ext Edema] : no lower extremity edema [Orthopena] : no orthopnea [Shortness Of Breath] : no shortness of breath [Wheezing] : no wheezing [Dyspnea on Exertion] : dyspnea on exertion [Abdominal Pain] : no abdominal pain [Vomiting] : no vomiting [Dysuria] : no dysuria [Incontinence] : no incontinence [Hematuria] : no hematuria [Joint Pain] : no joint pain [Back Pain] : no back pain [Itching] : no itching [Headache] : no headache [Memory Loss] : no memory loss [Suicidal] : not suicidal [Insomnia] : no insomnia [Anxiety] : no anxiety [Easy Bleeding] : no easy bleeding [Easy Bruising] : no easy bruising [Negative] : ENT

## 2023-06-13 NOTE — HISTORY OF PRESENT ILLNESS
[Home] : at home, [unfilled] , at the time of the visit. [Other Location: e.g. Home (Enter Location, City,State)___] : at [unfilled] [Verbal consent obtained from patient] : the patient, [unfilled] [Spouse] : spouse [FreeTextEntry1] : Follow-up for discharge from [facility] for AMI. \par  [de-identified] : Patient is a 73 year old male  enrolled in the [employer] program with a history of HTN, Hyperlipidemia, DM Type II, CAD s/p multiple PCIs (05/24/2005 PCI to mLAD x2, pLAD and 04/18/2005 PCI to mid/prox/distal RCA, OM2), and CABG x 2 in 2017 (LIMA-LAD reverse SVG- PDA), CKD Stage III (Baseline Cr 1.8-2), previous Covid infection, Chronic Diastolic CHF (EF 60% by echo 2020-admitted for exacerbation). Patient was admitted from 6/3/23 - 6/7/23 to [facility] for a diagnosis of AMI.  \par \par Hospital chart reviewed and copied as per [place] [facility] Discharge Summary:\par "73 obese M with PMHx HTN, Hyperlipidemia, DM Type II, CAD s/p multiple PCIs (05/24/2005 PCI to mLAD x2, pLAD and 04/18/2005 PCI to mid/prox/distal RCA, OM2), and CABG x 2 in 2017 (LIMA-LAD reverse SVG- PDA), CKD Stage III (Baseline Cr 1.8-2), previous Covid infection, Chronic Diastolic CHF (EF 60% by echo 2020-admitted for exacerbation) who presented to [facility] with worsening exertional chest pain, s/p PCI with rota and AUDRA x OM1, AUDRA x dLM and pLcx. Problem List/Main Diagnoses (system-based): NSTEMI (non-ST elevation myocardial infarction). Hx of CAD with multiple PCIs with subsequent 2V CABG 2017 LIMA-LAD reverse SVG- PDA) Presenting with exertional chest pain. -Initial EKG NSR at 75 bpm with 1 mm ST elevation inferior leads (III, AVF) with 1 mm ST depressions I, AVL (new compared to EKG 2020).Went for cath on 6/4 AM: : dLM into pLCx (90% severely calcified), OM1 mild dz, pLAD mod dz, mLAD 100% , D1 mild-mod dz, pRCA 100% ; Grafts: patent LIMA-LAD w/ large branch from LIMA to chest wall, patent SVG-RPDA w/ collaterals to LAD; HAMMAD 12mmHg, L radial access.Repeat Cath with rota/PCI on 6/6 with rota/PCI OM1 with AUDRA x OM1, AUDRA x dLM and dLcx - On discharge, take plavix 75mg daily, aspirin 81mg daily, and xarelto 15mg daily for 2 weeks (until 6/21). Afterwards, stop taking aspirin and continue taking plavix and xarelto. - C/w home med vytorin 10-80". \par \par Patient observed via tele health and is alert and oriented x 3, in no acute distress. Patient states they are feeling well today. Patient states they are eating and drinking well. Patient reports compliance with medications. States he has follow up with cardiologist this coming Thursday and with the surgeon on 7/3/23. Patient reports mild COTA but denies SOB at rest, chest pain or LE edema. Denies any cough, fever, chills, abdominal pain, palpitations, nausea, vomiting, diarrhea, lightheadedness, dizziness, shortness of breath, or chest pain.\par \par Patient contacted by TCM RN within 48 hours of discharge and d/c instructions reviewed.  Discharge medications were reviewed and reconciled with the current medication list and medications in home. Patient had 48 hour follow up call done by RACHELL Bruner on 6/8/23. \par

## 2023-06-14 DIAGNOSIS — Z79.01 LONG TERM (CURRENT) USE OF ANTICOAGULANTS: ICD-10-CM

## 2023-06-14 DIAGNOSIS — I13.0 HYPERTENSIVE HEART AND CHRONIC KIDNEY DISEASE WITH HEART FAILURE AND STAGE 1 THROUGH STAGE 4 CHRONIC KIDNEY DISEASE, OR UNSPECIFIED CHRONIC KIDNEY DISEASE: ICD-10-CM

## 2023-06-14 DIAGNOSIS — Z86.16 PERSONAL HISTORY OF COVID-19: ICD-10-CM

## 2023-06-14 DIAGNOSIS — I21.4 NON-ST ELEVATION (NSTEMI) MYOCARDIAL INFARCTION: ICD-10-CM

## 2023-06-14 DIAGNOSIS — Z95.1 PRESENCE OF AORTOCORONARY BYPASS GRAFT: ICD-10-CM

## 2023-06-14 DIAGNOSIS — I25.110 ATHEROSCLEROTIC HEART DISEASE OF NATIVE CORONARY ARTERY WITH UNSTABLE ANGINA PECTORIS: ICD-10-CM

## 2023-06-14 DIAGNOSIS — E11.22 TYPE 2 DIABETES MELLITUS WITH DIABETIC CHRONIC KIDNEY DISEASE: ICD-10-CM

## 2023-06-14 DIAGNOSIS — I50.32 CHRONIC DIASTOLIC (CONGESTIVE) HEART FAILURE: ICD-10-CM

## 2023-06-14 DIAGNOSIS — I44.0 ATRIOVENTRICULAR BLOCK, FIRST DEGREE: ICD-10-CM

## 2023-06-14 DIAGNOSIS — Z79.84 LONG TERM (CURRENT) USE OF ORAL HYPOGLYCEMIC DRUGS: ICD-10-CM

## 2023-06-14 DIAGNOSIS — Z79.82 LONG TERM (CURRENT) USE OF ASPIRIN: ICD-10-CM

## 2023-06-14 DIAGNOSIS — Z79.02 LONG TERM (CURRENT) USE OF ANTITHROMBOTICS/ANTIPLATELETS: ICD-10-CM

## 2023-06-14 DIAGNOSIS — N18.30 CHRONIC KIDNEY DISEASE, STAGE 3 UNSPECIFIED: ICD-10-CM

## 2023-06-14 DIAGNOSIS — I48.92 UNSPECIFIED ATRIAL FLUTTER: ICD-10-CM

## 2023-06-14 DIAGNOSIS — I25.84 CORONARY ATHEROSCLEROSIS DUE TO CALCIFIED CORONARY LESION: ICD-10-CM

## 2023-06-14 DIAGNOSIS — Z95.5 PRESENCE OF CORONARY ANGIOPLASTY IMPLANT AND GRAFT: ICD-10-CM

## 2023-06-14 DIAGNOSIS — E66.9 OBESITY, UNSPECIFIED: ICD-10-CM

## 2023-06-15 ENCOUNTER — NON-APPOINTMENT (OUTPATIENT)
Age: 73
End: 2023-06-15

## 2023-06-15 ENCOUNTER — APPOINTMENT (OUTPATIENT)
Dept: HEART AND VASCULAR | Facility: CLINIC | Age: 73
End: 2023-06-15
Payer: MEDICARE

## 2023-06-15 VITALS
WEIGHT: 242 LBS | DIASTOLIC BLOOD PRESSURE: 66 MMHG | HEIGHT: 68 IN | BODY MASS INDEX: 36.68 KG/M2 | TEMPERATURE: 96.6 F | OXYGEN SATURATION: 97 % | HEART RATE: 69 BPM | SYSTOLIC BLOOD PRESSURE: 142 MMHG

## 2023-06-15 DIAGNOSIS — D64.9 ANEMIA, UNSPECIFIED: ICD-10-CM

## 2023-06-15 PROCEDURE — 93000 ELECTROCARDIOGRAM COMPLETE: CPT

## 2023-06-15 PROCEDURE — 99214 OFFICE O/P EST MOD 30 MIN: CPT | Mod: 25

## 2023-06-15 RX ORDER — ALIROCUMAB 75 MG/ML
75 INJECTION, SOLUTION SUBCUTANEOUS
Qty: 1 | Refills: 3 | Status: DISCONTINUED | COMMUNITY
Start: 2023-01-23 | End: 2023-06-15

## 2023-06-15 RX ORDER — ISOSORBIDE DINITRATE 30 MG/1
30 TABLET ORAL
Refills: 0 | Status: DISCONTINUED | COMMUNITY
End: 2023-06-15

## 2023-06-15 NOTE — HISTORY OF PRESENT ILLNESS
[FreeTextEntry1] : hospitalized with NSTEMI and underwent PCI\par no further cp but has 1 flight COTA but improved-toprol +Lasix was stopped

## 2023-06-15 NOTE — PHYSICAL EXAM
[Well Developed] : well developed [Well Nourished] : well nourished [No Acute Distress] : no acute distress [Normal Venous Pressure] : normal venous pressure [No Carotid Bruit] : no carotid bruit [Normal S1, S2] : normal S1, S2 [No Murmur] : no murmur [No Rub] : no rub [No Gallop] : no gallop [Clear Lung Fields] : clear lung fields [Good Air Entry] : good air entry [No Respiratory Distress] : no respiratory distress  [Soft] : abdomen soft [Non Tender] : non-tender [No Masses/organomegaly] : no masses/organomegaly [Normal Bowel Sounds] : normal bowel sounds [Normal Gait] : normal gait [No Edema] : no edema [No Cyanosis] : no cyanosis [No Clubbing] : no clubbing [No Varicosities] : no varicosities [Diminished Pedal Pulses ___] : diminished pedal pulses [unfilled] [No Rash] : no rash [No Skin Lesions] : no skin lesions [Moves all extremities] : moves all extremities [No Focal Deficits] : no focal deficits [Normal Speech] : normal speech [Alert and Oriented] : alert and oriented [Normal memory] : normal memory [de-identified] : O/W [de-identified] : pale conjunctiva [de-identified] : ecchymosis in left groin

## 2023-06-15 NOTE — DISCUSSION/SUMMARY
[FreeTextEntry1] : EKG:NSR, T wave abn\par reviewed hospital records- developed atrial flutter and was put on Xarelto- feel stewart may be due anemia but multifactorial- dCHF/weight/deconditioned\par will send to cardiac rehab\par continue asa for 1 more week only and continue plavix + Imdur for CAD- would resume BB\par check CBC/BNP\par continue Vytorin for HLD and if LDL not at goal(< 70mg&) would try Praluent\par continue Xarelto for PAF

## 2023-06-16 LAB
ANION GAP SERPL CALC-SCNC: 15 MMOL/L
BUN SERPL-MCNC: 40 MG/DL
CALCIUM SERPL-MCNC: 9.6 MG/DL
CHLORIDE SERPL-SCNC: 104 MMOL/L
CHOLEST SERPL-MCNC: 144 MG/DL
CO2 SERPL-SCNC: 19 MMOL/L
CREAT SERPL-MCNC: 1.72 MG/DL
EGFR: 41 ML/MIN/1.73M2
ESTIMATED AVERAGE GLUCOSE: 183 MG/DL
GLUCOSE SERPL-MCNC: 225 MG/DL
HBA1C MFR BLD HPLC: 8 %
HCT VFR BLD CALC: 29.9 %
HDLC SERPL-MCNC: 43 MG/DL
HGB BLD-MCNC: 8.9 G/DL
LDLC SERPL CALC-MCNC: 66 MG/DL
MCHC RBC-ENTMCNC: 29.8 GM/DL
MCHC RBC-ENTMCNC: 31.3 PG
MCV RBC AUTO: 105.3 FL
NONHDLC SERPL-MCNC: 101 MG/DL
NT-PROBNP SERPL-MCNC: 1895 PG/ML
PLATELET # BLD AUTO: 318 K/UL
POTASSIUM SERPL-SCNC: 5.1 MMOL/L
RBC # BLD: 2.84 M/UL
RBC # FLD: 16.3 %
SODIUM SERPL-SCNC: 139 MMOL/L
TRIGL SERPL-MCNC: 175 MG/DL
WBC # FLD AUTO: 7.86 K/UL

## 2023-06-19 ENCOUNTER — TRANSCRIPTION ENCOUNTER (OUTPATIENT)
Age: 73
End: 2023-06-19

## 2023-06-30 ENCOUNTER — TRANSCRIPTION ENCOUNTER (OUTPATIENT)
Age: 73
End: 2023-06-30

## 2023-07-03 ENCOUNTER — APPOINTMENT (OUTPATIENT)
Age: 73
End: 2023-07-03

## 2023-08-31 ENCOUNTER — APPOINTMENT (OUTPATIENT)
Dept: HEART AND VASCULAR | Facility: CLINIC | Age: 73
End: 2023-08-31

## 2023-10-03 ENCOUNTER — OFFICE (OUTPATIENT)
Dept: URBAN - METROPOLITAN AREA CLINIC 76 | Facility: CLINIC | Age: 73
Setting detail: OPHTHALMOLOGY
End: 2023-10-03
Payer: MEDICARE

## 2023-10-03 DIAGNOSIS — E11.3293: ICD-10-CM

## 2023-10-03 DIAGNOSIS — Z96.1: ICD-10-CM

## 2023-10-03 DIAGNOSIS — H43.813: ICD-10-CM

## 2023-10-03 DIAGNOSIS — H44.23: ICD-10-CM

## 2023-10-03 DIAGNOSIS — H26.40: ICD-10-CM

## 2023-10-03 DIAGNOSIS — H35.373: ICD-10-CM

## 2023-10-03 DIAGNOSIS — H35.439: ICD-10-CM

## 2023-10-03 DIAGNOSIS — H35.40: ICD-10-CM

## 2023-10-03 DIAGNOSIS — H52.4: ICD-10-CM

## 2023-10-03 DIAGNOSIS — H35.423: ICD-10-CM

## 2023-10-03 PROCEDURE — 92014 COMPRE OPH EXAM EST PT 1/>: CPT | Performed by: OPHTHALMOLOGY

## 2023-10-03 PROCEDURE — 92250 FUNDUS PHOTOGRAPHY W/I&R: CPT | Performed by: OPHTHALMOLOGY

## 2023-10-03 PROCEDURE — 92015 DETERMINE REFRACTIVE STATE: CPT | Performed by: OPHTHALMOLOGY

## 2023-10-03 ASSESSMENT — REFRACTION_MANIFEST
OD_ADD: +3.00
OS_ADD: +3.00
OS_ADD: +2.75
OD_SPHERE: -1.25
OS_VA1: 20/25
OS_SPHERE: -1.25
OS_SPHERE: -1.25
OD_ADD: +2.75
OS_VA1: 20/25
OS_CYLINDER: -1.50
OD_AXIS: 95
OS_CYLINDER: -1.50
OD_AXIS: 100
OD_VA1: 20/25
OD_CYLINDER: -1.50
OS_AXIS: 80
OD_SPHERE: -1.25
OD_VA1: 20/25
OD_CYLINDER: -1.50
OS_AXIS: 80

## 2023-10-03 ASSESSMENT — TONOMETRY
OD_IOP_MMHG: 14
OS_IOP_MMHG: 13

## 2023-10-03 ASSESSMENT — KERATOMETRY
OS_K2POWER_DIOPTERS: 44.25
OD_K1POWER_DIOPTERS: 43.00
OD_AXISANGLE_DEGREES: 009
OD_K2POWER_DIOPTERS: 44.00
OS_K1POWER_DIOPTERS: 42.50
OS_AXISANGLE_DEGREES: 173

## 2023-10-03 ASSESSMENT — CONFRONTATIONAL VISUAL FIELD TEST (CVF)
OS_FINDINGS: FULL
OD_FINDINGS: FULL

## 2023-10-03 ASSESSMENT — REFRACTION_CURRENTRX
OS_AXIS: 74
OD_OVR_VA: 20/
OD_SPHERE: -1.50
OS_ADD: +2.75
OD_AXIS: 94
OD_ADD: +2.75
OD_CYLINDER: -1.00
OS_CYLINDER: -1.00
OS_SPHERE: -1.50
OS_OVR_VA: 20/

## 2023-10-03 ASSESSMENT — SPHEQUIV_DERIVED
OD_SPHEQUIV: -2.625
OS_SPHEQUIV: -2
OS_SPHEQUIV: -2
OD_SPHEQUIV: -2
OD_SPHEQUIV: -2
OS_SPHEQUIV: -2.125

## 2023-10-03 ASSESSMENT — REFRACTION_AUTOREFRACTION
OD_SPHERE: -1.50
OS_AXIS: 078
OS_SPHERE: -1.00
OD_AXIS: 095
OS_CYLINDER: -2.25
OD_CYLINDER: -2.25

## 2023-10-03 ASSESSMENT — AXIALLENGTH_DERIVED
OS_AL: 24.4993
OS_AL: 24.4469
OD_AL: 24.3977
OD_AL: 24.661
OD_AL: 24.3977
OS_AL: 24.4469

## 2023-10-03 ASSESSMENT — VISUAL ACUITY
OD_BCVA: 20/25
OS_BCVA: 20/25

## 2023-10-05 ENCOUNTER — APPOINTMENT (OUTPATIENT)
Dept: HEART AND VASCULAR | Facility: CLINIC | Age: 73
End: 2023-10-05
Payer: MEDICARE

## 2023-10-05 VITALS
WEIGHT: 241 LBS | OXYGEN SATURATION: 98 % | HEIGHT: 68 IN | BODY MASS INDEX: 36.53 KG/M2 | DIASTOLIC BLOOD PRESSURE: 54 MMHG | TEMPERATURE: 97.2 F | SYSTOLIC BLOOD PRESSURE: 125 MMHG | HEART RATE: 58 BPM

## 2023-10-05 DIAGNOSIS — I51.7 CARDIOMEGALY: ICD-10-CM

## 2023-10-05 DIAGNOSIS — I35.1 NONRHEUMATIC AORTIC (VALVE) INSUFFICIENCY: ICD-10-CM

## 2023-10-05 PROCEDURE — 93306 TTE W/DOPPLER COMPLETE: CPT

## 2023-10-05 PROCEDURE — 99214 OFFICE O/P EST MOD 30 MIN: CPT | Mod: 25

## 2023-10-05 PROCEDURE — 93000 ELECTROCARDIOGRAM COMPLETE: CPT

## 2023-10-26 NOTE — DISCHARGE NOTE ADULT - CARE PLAN
Fall with Harm Risk Principal Discharge DX:	CAD (coronary artery disease)  Goal:	s/ p CABG  Instructions for follow-up, activity and diet:	-Please follow up with Dr. Mckeon on 3/1/17 at 3:00PM.  The office is located at [facility], [facility], 4th floor. Call us with any questions #[phone]. Prompt #1.  -Please make a follow-up appointment with your primary care provider, cardiologist and endocrinologist within 1-2 weeks of discharge.   -Walk daily as tolerated and use your incentive spirometer ten times every hour.    -No driving or strenuous activity/exercise for 6 weeks, or until cleared by your surgeon. Please do not lift anything greater than ten pounds.   -Gently clean your incisions with anti-bacterial soap and water, pat dry.  You may leave them open to air.    -Call your doctor if you have shortness of breath, chest pain not relieved by pain medication, dizziness, fever >101.5, or increased redness or drainage from incisions. Principal Discharge DX:	CAD (coronary artery disease)  Goal:	s/ p CABG  Instructions for follow-up, activity and diet:	-Please follow up with Dr. Mckeon on 3/1/17 at 3:00PM.  The office is located at [facility], [facility], 4th floor. Call us with any questions #[phone]. Prompt #1.  -Please make a follow-up appointment with your primary care provider, cardiologist and endocrinologist within 1-2 weeks of discharge.   -Walk daily as tolerated and use your incentive spirometer ten times every hour.    -No driving or strenuous activity/exercise for 6 weeks, or until cleared by your surgeon. Please do not lift anything greater than ten pounds.   -Gently clean your incisions with anti-bacterial soap and water, pat dry.  You may leave them open to air.    -Call your doctor if you have shortness of breath, chest pain not relieved by pain medication, dizziness, fever >101.5, or increased redness or drainage from incisions.

## 2023-10-27 NOTE — REASON FOR VISIT
NULL
[Initial Visit ___] : [unfilled] is here today for an initial visit  for [unfilled]
[Initial Visit ___] : [unfilled] is here today for an initial visit  for [unfilled]

## 2023-11-01 ENCOUNTER — RX RENEWAL (OUTPATIENT)
Age: 73
End: 2023-11-01

## 2023-12-06 ENCOUNTER — RX RENEWAL (OUTPATIENT)
Age: 73
End: 2023-12-06

## 2023-12-14 ENCOUNTER — RX RENEWAL (OUTPATIENT)
Age: 73
End: 2023-12-14

## 2023-12-14 ENCOUNTER — OUTPATIENT (OUTPATIENT)
Dept: OUTPATIENT SERVICES | Facility: HOSPITAL | Age: 73
LOS: 1 days | End: 2023-12-14
Payer: MEDICARE

## 2023-12-14 ENCOUNTER — APPOINTMENT (OUTPATIENT)
Dept: HEART AND VASCULAR | Facility: CLINIC | Age: 73
End: 2023-12-14
Payer: MEDICARE

## 2023-12-14 VITALS
HEART RATE: 64 BPM | DIASTOLIC BLOOD PRESSURE: 80 MMHG | SYSTOLIC BLOOD PRESSURE: 148 MMHG | HEIGHT: 68 IN | WEIGHT: 245 LBS | BODY MASS INDEX: 37.13 KG/M2

## 2023-12-14 DIAGNOSIS — I10 ESSENTIAL (PRIMARY) HYPERTENSION: ICD-10-CM

## 2023-12-14 DIAGNOSIS — Z95.1 PRESENCE OF AORTOCORONARY BYPASS GRAFT: Chronic | ICD-10-CM

## 2023-12-14 DIAGNOSIS — E87.79 ESSENTIAL (PRIMARY) HYPERTENSION: ICD-10-CM

## 2023-12-14 DIAGNOSIS — R06.09 OTHER FORMS OF DYSPNEA: ICD-10-CM

## 2023-12-14 DIAGNOSIS — Z95.5 PRESENCE OF CORONARY ANGIOPLASTY IMPLANT AND GRAFT: Chronic | ICD-10-CM

## 2023-12-14 LAB
POCT ISTAT CREATININE: 1.9 MG/DL — HIGH (ref 0.5–1.3)
POCT ISTAT CREATININE: 1.9 MG/DL — HIGH (ref 0.5–1.3)

## 2023-12-14 PROCEDURE — 36415 COLL VENOUS BLD VENIPUNCTURE: CPT

## 2023-12-14 PROCEDURE — 75574 CT ANGIO HRT W/3D IMAGE: CPT

## 2023-12-14 PROCEDURE — 93000 ELECTROCARDIOGRAM COMPLETE: CPT

## 2023-12-14 PROCEDURE — 99214 OFFICE O/P EST MOD 30 MIN: CPT | Mod: 25

## 2023-12-14 PROCEDURE — 82565 ASSAY OF CREATININE: CPT

## 2023-12-14 PROCEDURE — 75574 CT ANGIO HRT W/3D IMAGE: CPT | Mod: 26,MH

## 2023-12-14 RX ORDER — EVOLOCUMAB 140 MG/ML
140 INJECTION, SOLUTION SUBCUTANEOUS
Qty: 3 | Refills: 1 | Status: DISCONTINUED | COMMUNITY
Start: 2023-10-05 | End: 2023-12-14

## 2023-12-14 RX ORDER — LOSARTAN POTASSIUM 25 MG/1
25 TABLET, FILM COATED ORAL TWICE DAILY
Qty: 180 | Refills: 1 | Status: ACTIVE | COMMUNITY
Start: 2021-04-07 | End: 1900-01-01

## 2023-12-14 NOTE — DISCUSSION/SUMMARY
[FreeTextEntry1] : EKG:NSr,First degree AVB will get CCTA for cp/stewart to r/o ISR add ranexa for cp and continue imdur + toprol +plavix for CAD; continue vytorin for HLD walk and vascular f/u for claudication stewart probably multifactorial but will check proBNP

## 2023-12-14 NOTE — HISTORY OF PRESENT ILLNESS
[FreeTextEntry1] : reports to improvement in chest burning on Imdur and still with COTA- gained weight, still with caludication not taking Repatha as too expensive

## 2023-12-14 NOTE — PHYSICAL EXAM
[Well Developed] : well developed [Well Nourished] : well nourished [No Acute Distress] : no acute distress [Normal Venous Pressure] : normal venous pressure [No Carotid Bruit] : no carotid bruit [Normal S1, S2] : normal S1, S2 [No Murmur] : no murmur [No Rub] : no rub [No Gallop] : no gallop [Clear Lung Fields] : clear lung fields [Good Air Entry] : good air entry [No Respiratory Distress] : no respiratory distress  [Soft] : abdomen soft [Non Tender] : non-tender [No Masses/organomegaly] : no masses/organomegaly [Normal Bowel Sounds] : normal bowel sounds [Normal Gait] : normal gait [No Edema] : no edema [No Cyanosis] : no cyanosis [No Clubbing] : no clubbing [No Varicosities] : no varicosities [Diminished Pedal Pulses ___] : diminished pedal pulses [unfilled] [No Rash] : no rash [No Skin Lesions] : no skin lesions [Moves all extremities] : moves all extremities [No Focal Deficits] : no focal deficits [Normal Speech] : normal speech [Alert and Oriented] : alert and oriented [Normal memory] : normal memory [de-identified] : O/W [de-identified] : ecchymosis in left groin

## 2023-12-15 VITALS
RESPIRATION RATE: 16 BRPM | TEMPERATURE: 97 F | OXYGEN SATURATION: 97 % | HEIGHT: 68 IN | WEIGHT: 244.93 LBS | HEART RATE: 68 BPM

## 2023-12-15 LAB — NT-PROBNP SERPL-MCNC: 878 PG/ML

## 2023-12-15 RX ORDER — CHLORHEXIDINE GLUCONATE 213 G/1000ML
1 SOLUTION TOPICAL ONCE
Refills: 0 | Status: DISCONTINUED | OUTPATIENT
Start: 2023-12-18 | End: 2023-12-19

## 2023-12-15 NOTE — H&P ADULT - ASSESSMENT
73 obese M with PMHx HTN, HLD, T2DM, HFpEF, pAfib (on Xarelto last dose 12/15/23), CAD s/p CABGx2 (in 2017 (LIMA-LAD reverse SVG- PDA) & s/p multiple PCIs (mLAD/pLAD/m-p-dRCA/OM2, recent admission for NSTEMI 6/6/23 s/p AUDRA OM1/dLM/pLcx, CKD Stage III (Baseline Cr 1.8-2) who now presents to [facility] for recommended cardiac cath w/ possible intervention if clinically indicated, in light of pts risk factors, CCS class III anginal symptoms and abnormal CCTA.    -ASA 3, Mallampati 3  -VSS  -Pt compliant w/ home Plavix 75 mg daily, last dose 12/15/23. Load w/ Plavix 75mg and aspirin 325mg POx1 prior to cath. Denies any recent bleeding. H&h 11.7/36.6.  -Pre cath IVF Hydration: NS 500cc bolus then c/w maintenance NS @ 75cc/hr x 2hrs. 2+ pitting b/l LE edema (chronic, at baseline per pt and wife). Lungs CTA b/l. Cr 1.62.   -Pre cath consented  -Suitable for moderate sedation    Risks & benefits of procedure and alternative therapy have been explained to the patient including but not limited to: allergic reaction, bleeding w/possible need for blood transfusion, infection, renal and vascular compromise, limb damage, arrhythmia, stroke, vessel dissection/perforation, Myocardial infarction, emergent CABG. Informed consent obtained and in chart.   73 obese M with PMHx HTN, HLD, T2DM, HFpEF, pAfib (on Xarelto last dose 12/15/23), CAD s/p CABGx2 (in 2017 (LIMA-LAD reverse SVG- PDA) & s/p multiple PCIs (mLAD/pLAD/m-p-dRCA/OM2, recent admission for NSTEMI 6/6/23 s/p AUDRA OM1/dLM/pLcx, CKD Stage III (Baseline Cr 1.8-2) who now presents to [facility] for recommended cardiac cath w/ possible intervention if clinically indicated, in light of pts risk factors, CCS class III anginal symptoms and abnormal CCTA.    -ASA 3, Mallampati 3  -VSS  -Pt compliant w/ home Plavix 75 mg daily, last dose 12/15/23. Load w/ Plavix 75mg and aspirin 325mg POx1 prior to cath. Denies any recent bleeding. H&h 11.7/36.6.  -Pre cath IVF Hydration: NS 500cc bolus then c/w maintenance NS @ 75cc/hr x 2hrs. 2+ pitting b/l LE edema (chronic, at baseline per pt and wife). Lungs CTA b/l. Cr 1.62.   -Pre cath consented  -Suitable for moderate sedation    Risks & benefits of procedure and alternative therapy have been explained to the patient including but not limited to: allergic reaction, bleeding w/possible need for blood transfusion, infection, renal and vascular compromise, limb damage, arrhythmia, stroke, vessel dissection/perforation, Myocardial infarction, emergent CABG. Informed consent obtained and in chart.   73 obese M with PMHx HTN, HLD, T2DM, HFpEF, pAfib (on Xarelto last dose 12/15/23), CAD s/p CABGx2 (in 2017 (LIMA-LAD reverse SVG- PDA) & s/p multiple PCIs (mLAD/pLAD/m-p-dRCA/OM2, recent admission for NSTEMI 6/6/23 s/p AUDRA OM1/dLM/pLcx, CKD Stage III (Baseline Cr 1.8-2) who now presents to [facility] for recommended cardiac cath w/ possible intervention if clinically indicated, in light of pts risk factors, CCS class III anginal symptoms and abnormal CCTA.    -ASA 3, Mallampati 3  -VSS  -Pt compliant w/ home Plavix 75 mg daily, last dose yesterday 12/17/23. Load w/ Plavix 75mg and aspirin 325mg POx1 prior to cath. Denies any recent bleeding. H&h 11.7/36.6.  -Pre cath IVF Hydration: NS 500cc bolus then c/w maintenance NS @ 75cc/hr x 2hrs. 2+ pitting b/l LE edema (chronic, at baseline per pt and wife). Lungs CTA b/l. Cr 1.62. EF 60%.   -Pre cath consented  -Suitable for moderate sedation    Risks & benefits of procedure and alternative therapy have been explained to the patient including but not limited to: allergic reaction, bleeding w/possible need for blood transfusion, infection, renal and vascular compromise, limb damage, arrhythmia, stroke, vessel dissection/perforation, Myocardial infarction, emergent CABG. Informed consent obtained and in chart.   73 obese M with PMHx HTN, HLD, T2DM, HFpEF, pAfib (on Xarelto last dose 12/15/23), CAD s/p CABGx2 (in 2017 (LIMA-LAD reverse SVG- PDA) & s/p multiple PCIs (mLAD/pLAD/m-p-dRCA/OM2, recent admission for NSTEMI 6/6/23 s/p AUDRA OM1/dLM/pLcx, CKD Stage III (Baseline Cr 1.8-2) who now presents to [facility] for recommended cardiac cath w/ possible intervention if clinically indicated, in light of pts risk factors, CCS class III anginal symptoms and abnormal CCTA.    -ASA 3, Mallampati 3  -VSS  -Pt compliant w/ home Plavix 75 mg daily, last dose yesterday 12/17/23. Load w/ Plavix 75mg and aspirin 325mg POx1 prior to cath. Denies any recent bleeding. H&h 11.7/36.6.  -Pre cath IVF Hydration: NS 500cc bolus then c/w maintenance NS @ 75cc/hr x 2hrs. 2+ pitting b/l LE edema (chronic, at baseline per pt and wife). Lungs CTA b/l. Cr 1.62. EF 60%.   -Pre cath consented  -Suitable for moderate sedation    Risks & benefits of procedure and alternative therapy have been explained to the patient including but not limited to: allergic reaction, bleeding w/possible need for blood transfusion, infection, renal and vascular compromise, limb damage, arrhythmia, stroke, vessel dissection/perforation, Myocardial infarction, emergent CABG. Informed consent obtained and in chart.

## 2023-12-15 NOTE — H&P ADULT - HISTORY OF PRESENT ILLNESS
Cardiologist: Dr. Sharpe  Pharmacy:   Escort:     73 obese M with PMHx HTN, Hyperlipidemia, DM Type II, pAfib (on Xarelto last dose XX), CAD s/p multiple PCIs (05/24/2005 PCI to mLAD x2, pLAD and 04/18/2005 PCI to mid/prox/distal RCA, OM2, NSTEMI and prior [facility] admission 6/6/23 s/p PCI with rota and AUDRA x OM1, AUDRA x dLM and pLcx.), and CABG x 2 in 2017 (LIMA-LAD reverse SVG- PDA), CKD Stage III (Baseline Cr 1.8-2), previous Covid infection, Chronic Diastolic CHF (EF 60% by echo 2023-admitted for [facility]) who presented to outpatient cardiologist Dr. Sharpe complaint of improvement in chest burning but endorses dyspnea on exertion. Pt gained weight and still complains of claudication.     LHC 6/4/23: dLM into pLCx (90% severely calcified), OM1 mild dz, pLAD mod dz, mLAD 100% , D1 mild-mod dz, pRCA 100% ; Grafts: patent LIMA-LAD w/ large branch from LIMA to chest wall, patent SVG-RPDA w/ collaterals to LAD; HAMMAD 12mmHg, L radial access.   LHC 6/6/23: PCI with rota and AUDRA x OM1, AUDRA x dLM and pLcx.  TTE 06/03/23: EF=60%. Mild LVH. Mild dilated LA. Aortic sclerosis without significant stenosis. Trace AR. No evidence of pulmonary hypertension. No evidence of pleural effusion.     In light of pt's risk factors, CCS class XX anginal symptoms and abnormal CCTA, pt presents for [facility] with possible intervention if clinically indicated.      Cardiologist: Dr. Sharpe  Pharmacy:   Escort:     73 obese M with PMHx HTN, Hyperlipidemia, DM Type II, pAfib (on Xarelto last dose XX), CAD s/p multiple PCIs (05/24/2005 PCI to mLAD x2, pLAD and 04/18/2005 PCI to mid/prox/distal RCA, OM2, NSTEMI and prior [facility] admission 6/6/23 s/p PCI with rota and AUDRA x OM1, AUDRA x dLM and pLcx.), and CABG x 2 in 2017 (LIMA-LAD reverse SVG- PDA), CKD Stage III (Baseline Cr 1.8-2), previous Covid infection, Chronic Diastolic CHF (EF 60% by echo 2023-admitted for [facility]) who presented to outpatient cardiologist Dr. Sharpe complaint of improvement in chest burning but endorses dyspnea on exertion. Pt gained weight and still complains of claudication.     LHC 6/4/23: dLM into pLCx (90% severely calcified), OM1 mild dz, pLAD mod dz, mLAD 100% , D1 mild-mod dz, pRCA 100% ; Grafts: patent LIMA-LAD w/ large branch from LIMA to chest wall, patent SVG-RPDA w/ collaterals to LAD; HAMMAD 12mmHg, L radial access.   LHC 6/6/23: PCI with rota and AUDRA x OM1, AUDRA x dLM and pLcx.  TTE 06/03/23: EF=60%. Mild LVH. Mild dilated LA. Aortic sclerosis without significant stenosis. Trace AR. No evidence of pulmonary hypertension. No evidence of pleural effusion.     In light of pt's risk factors, CCS class XX anginal symptoms and abnormal CCTA, pt presents for [facility] with possible intervention if clinically indicated.      Cardiologist: Dr. Sharpe  Pharmacy:   Escort:     73 obese M with PMHx HTN, Hyperlipidemia, DM Type II, pAfib (on Xarelto last dose XX), CAD s/p multiple PCIs (05/24/2005 PCI to mLAD x2, pLAD and 04/18/2005 PCI to mid/prox/distal RCA, OM2, NSTEMI and prior [facility] admission 6/6/23 s/p PCI with rota and AUDRA x OM1, AUDRA x dLM and pLcx.), and CABG x 2 in 2017 (LIMA-LAD reverse SVG- PDA), CKD Stage III (Baseline Cr 1.8-2), previous Covid infection, Chronic Diastolic CHF (EF 60% by echo 2023-admitted for [facility]) who presented to outpatient cardiologist Dr. Sharpe complaint of improvement in chest burning but endorses dyspnea on exertion. Pt gained weight and still complains of claudication.     [facility] 6/4/23: dLM into pLCx (90% severely calcified), OM1 mild dz, pLAD mod dz, mLAD 100% , D1 mild-mod dz, pRCA 100% ; Grafts: patent LIMA-LAD w/ large branch from LIMA to chest wall, patent SVG-RPDA w/ collaterals to LAD; HAMMAD 12mmHg, L radial access.   [facility] 6/6/23: PCI with rota and AUDRA x OM1, AUDRA x dLM and pLcx.  TTE 06/03/23: EF=60%. Mild LVH. Mild dilated LA. Aortic sclerosis without significant stenosis. Trace AR. No evidence of pulmonary hypertension. No evidence of pleural effusion.   CCTA 12/14/23: Patent LIMA-LAD. SVG to RPDA noted, w mild stenosis in the prox portion of the graft due to noncalcific plaque, otherwise patent graft. Probable ISR on LM and pLCx prior stents. Calcific plaque in the mLCX where significant stenosis cannot be excluded. Long stent in the proximal to distal LAD and OM1, which cannot be assessed due to significant motion. RCA with stents noted (proximal to distal) with total occlusion. RPDA fills via SVG graft. Probable nonobstructive disease in the RPDA distal to the touchdown.    In light of pt's risk factors, CCS class XX anginal symptoms and abnormal CCTA, pt presents for [facility] with possible intervention if clinically indicated.      Cardiologist: Dr. Sharpe  Pharmacy:   Escort:     73 obese M with PMHx HTN, Hyperlipidemia, DM Type II, pAfib (on Xarelto last dose XX), CAD s/p multiple PCIs (05/24/2005 PCI to mLAD x2, pLAD and 04/18/2005 PCI to mid/prox/distal RCA, OM2, NSTEMI and prior [facility] admission 6/6/23 s/p PCI with rota and AUDRA x OM1, AUDRA x dLM and pLcx.), and CABG x 2 in 2017 (LIMA-LAD reverse SVG- PDA), CKD Stage III (Baseline Cr 1.8-2), previous Covid infection, Chronic Diastolic CHF (EF 60% by echo 2023-admitted for [facility]) who presented to outpatient cardiologist Dr. Sharpe complaint of improvement in chest burning but endorses dyspnea on exertion. Pt gained weight and still complains of claudication.     [facility] 6/4/23: dLM into pLCx (90% severely calcified), OM1 mild dz, pLAD mod dz, mLAD 100% , D1 mild-mod dz, pRCA 100% ; Grafts: patent LIMA-LAD w/ large branch from LIMA to chest wall, patent SVG-RPDA w/ collaterals to LAD; HAMMAD 12mmHg, L radial access.   [facility] 6/6/23: PCI with rota and AUDRA x OM1, AUDRA x dLM and pLcx.  TTE 06/03/23: EF=60%. Mild LVH. Mild dilated LA. Aortic sclerosis without significant stenosis. Trace AR. No evidence of pulmonary hypertension. No evidence of pleural effusion.   CCTA 12/14/23: Patent LIMA-LAD. SVG to RPDA noted, w mild stenosis in the prox portion of the graft due to noncalcific plaque, otherwise patent graft. Probable ISR on LM and pLCx prior stents. Calcific plaque in the mLCX where significant stenosis cannot be excluded. Long stent in the proximal to distal LAD and OM1, which cannot be assessed due to significant motion. RCA with stents noted (proximal to distal) with total occlusion. RPDA fills via SVG graft. Probable nonobstructive disease in the RPDA distal to the touchdown.    In light of pt's risk factors, CCS class XX anginal symptoms and abnormal CCTA, pt presents for [facility] with possible intervention if clinically indicated.      Cardiologist: Dr. Sharpe  Pharmacy: [facility] (in chart)  Escort: Wife    73 obese M with PMHx HTN, Hyperlipidemia, DM Type II, pAfib (on Xarelto last dose XX), CAD s/p multiple PCIs (05/24/2005 PCI to mLAD x2, pLAD and 04/18/2005 PCI to mid/prox/distal RCA, OM2, NSTEMI and prior [facility] admission 6/6/23 s/p PCI with rota and AUDRA x OM1, AUDRA x dLM and pLcx.), and CABG x 2 in 2017 (LIMA-LAD reverse SVG- PDA), CKD Stage III (Baseline Cr 1.8-2), previous Covid infection, Chronic Diastolic CHF (EF 60% by echo 2023-admitted for [facility]) who presented to outpatient cardiologist Dr. Sharpe complaint of improvement in chest burning but endorses dyspnea on exertion. Pt gained weight and still complains of claudication.     [facility] 6/4/23: dLM into pLCx (90% severely calcified), OM1 mild dz, pLAD mod dz, mLAD 100% , D1 mild-mod dz, pRCA 100% ; Grafts: patent LIMA-LAD w/ large branch from LIMA to chest wall, patent SVG-RPDA w/ collaterals to LAD; HAMMAD 12mmHg, L radial access.   [facility] 6/6/23: PCI with rota and AUDRA x OM1, AUDRA x dLM and pLCx.  TTE 6/3/23: EF 60%. Mild LVH. Mild dilated LA. Aortic sclerosis without significant stenosis. Trace AR. No evidence of pulmonary hypertension. No evidence of pleural effusion.   CCTA 12/14/23: Patent LIMA-LAD. SVG to RPDA noted, w mild stenosis in the prox portion of the graft due to noncalcific plaque, otherwise patent graft. Probable ISR on LM and pLCx prior stents. Calcific plaque in the mLCX where significant stenosis cannot be excluded. Long stent in the proximal to distal LAD and OM1, which cannot be assessed due to significant motion. RCA with stents noted (proximal to distal) with total occlusion. RPDA fills via SVG graft. Probable nonobstructive disease in the RPDA distal to the touchdown.    In light of pt's risk factors, CCS class II anginal symptoms and abnormal CCTA, pt presents for [facility] with possible intervention if clinically indicated.    Cardiologist: Dr. Sharpe  Pharmacy: [facility] (in chart)  Escort: Wife    73 obese M with PMHx HTN, Hyperlipidemia, DM Type II, pAfib (on Xarelto last dose XX), CAD s/p multiple PCIs (05/24/2005 PCI to mLAD x2, pLAD and 04/18/2005 PCI to mid/prox/distal RCA, OM2, NSTEMI and prior [facility] admission 6/6/23 s/p PCI with rota and AUDRA x OM1, AUDRA x dLM and pLcx.), and CABG x 2 in 2017 (LIMA-LAD reverse SVG- PDA), CKD Stage III (Baseline Cr 1.8-2), previous Covid infection, Chronic Diastolic CHF (EF 60% by echo 2023-admitted for [facility]) who presented to outpatient cardiologist Dr. Sharpe complaint of improvement in chest burning but endorses dyspnea on exertion. Pt gained weight and still complains of claudication.     [facility] 6/4/23: dLM into pLCx (90% severely calcified), OM1 mild dz, pLAD mod dz, mLAD 100% , D1 mild-mod dz, pRCA 100% ; Grafts: patent LIMA-LAD w/ large branch from LIMA to chest wall, patent SVG-RPDA w/ collaterals to LAD; HAMMAD 12mmHg, L radial access.   [facility] 6/6/23: PCI with rota and AUDRA x OM1, AUDRA x dLM and pLCx.  TTE 6/3/23: EF 60%. Mild LVH. Mild dilated LA. Aortic sclerosis without significant stenosis. Trace AR. No evidence of pulmonary hypertension. No evidence of pleural effusion.   CCTA 12/14/23: Patent LIMA-LAD. SVG to RPDA noted, w mild stenosis in the prox portion of the graft due to noncalcific plaque, otherwise patent graft. Probable ISR on LM and pLCx prior stents. Calcific plaque in the mLCX where significant stenosis cannot be excluded. Long stent in the proximal to distal LAD and OM1, which cannot be assessed due to significant motion. RCA with stents noted (proximal to distal) with total occlusion. RPDA fills via SVG graft. Probable nonobstructive disease in the RPDA distal to the touchdown.    In light of pt's risk factors, CCS class II anginal symptoms and abnormal CCTA, pt presents for [facility] with possible intervention if clinically indicated.    Cardiologist: Dr. Sharpe  Pharmacy: [facility] (in chart)  Escort: Wife    73 obese M with PMHx HTN, HLD, T2DM, HFpEF, pAfib (on Xarelto last dose 12/15/23), CAD s/p CABGx2 (in 2017 (LIMA-LAD reverse SVG- PDA) & s/p multiple PCIs (mLAD/pLAD/m-p-dRCA/OM2, recent admission for NSTEMI 6/6/23 s/p AUDRA OM1/dLM/pLcx, CKD Stage III (Baseline Cr 1.8-2), who presented to outpatient cardiologist Dr. Sharpe after prior cardiac cath complaining of progressing COTA upon ambulating a few city blocks in addition to continues chest burning sensation. He further endorses of b/l LE claudication sx. Denies any chest pain, PND, orthopnea, LE above baseline, palpitations, dizziness/lightheadedness, n/v/d, fever, chills, recent sick contacts.     [facility] 6/4/23: dLM into pLCx (90% severely calcified), OM1 mild dz, pLAD mod dz, mLAD 100% , D1 mild-mod dz, pRCA 100% ; Grafts: patent LIMA-LAD w/ large branch from LIMA to chest wall, patent SVG-RPDA w/ collaterals to LAD; HAMMAD 12mmHg, L radial access.   LHC 6/6/23: PCI with rota and UADRA x OM1, AUDRA x dLM and pLCx.  TTE 6/3/23: EF 60%. Mild LVH. Mild dilated LA. Aortic sclerosis without significant stenosis. Trace AR. No evidence of pulmonary hypertension. No evidence of pleural effusion.   CCTA 12/14/23: Patent LIMA-LAD. SVG to RPDA noted, w mild stenosis in the prox portion of the graft due to noncalcific plaque, otherwise patent graft. Probable ISR on LM and pLCx prior stents. Calcific plaque in the mLCX where significant stenosis cannot be excluded. Long stent in the proximal to distal LAD and OM1, which cannot be assessed due to significant motion. RCA with stents noted (proximal to distal) with total occlusion. RPDA fills via SVG graft. Probable nonobstructive disease in the RPDA distal to the touchdown.    In light of pt's risk factors, CCS class II anginal symptoms and abnormal CCTA, pt presents for [facility] with possible intervention if clinically indicated.    Cardiologist: Dr. Sharpe  Pharmacy: [facility] (in chart)  Escort: Wife    73 obese M with PMHx HTN, HLD, T2DM, HFpEF, pAfib (on Xarelto last dose 12/15/23), CAD s/p CABGx2 (in 2017 (LIMA-LAD reverse SVG- PDA) & s/p multiple PCIs (mLAD/pLAD/m-p-dRCA/OM2, recent admission for NSTEMI 6/6/23 s/p AUDRA OM1/dLM/pLcx, CKD Stage III (Baseline Cr 1.8-2), who presented to outpatient cardiologist Dr. Sharpe after prior cardiac cath complaining of progressing COTA upon ambulating a few city blocks in addition to continues chest burning sensation. He further endorses of b/l LE claudication sx. Denies any chest pain, PND, orthopnea, LE above baseline, palpitations, dizziness/lightheadedness, n/v/d, fever, chills, recent sick contacts.     Diagnostic [facility] 6/4/23: dLM into pLCx (90% severely calcified), OM1 mild dz, pLAD mod dz, mLAD 100% , D1 mild-mod dz, pRCA 100% ; Grafts: patent LIMA-LAD w/ large branch from LIMA to chest wall, patent SVG-RPDA w/ collaterals to LAD; HAMMAD 12mmHg, L radial access.   LHC 6/6/23: PCI with rota and AUDRA x OM1, AUDRA x dLM and pLCx.  TTE 6/3/23: EF 60%. Mild LVH. Mild dilated LA. Aortic sclerosis without significant stenosis. Trace AR. No evidence of pulmonary hypertension. No evidence of pleural effusion.   CCTA 12/14/23: Patent LIMA-LAD. SVG to RPDA noted, w mild stenosis in the prox portion of the graft due to noncalcific plaque, otherwise patent graft. Probable ISR on LM and pLCx prior stents. Calcific plaque in the mLCX where significant stenosis cannot be excluded. Long stent in the proximal to distal LAD and OM1, which cannot be assessed due to significant motion. RCA with stents noted (proximal to distal) with total occlusion. RPDA fills via SVG graft. Probable nonobstructive disease in the RPDA distal to the touchdown.    In light of pt's risk factors, CCS class II anginal symptoms and abnormal CCTA, pt presents for [facility] with possible intervention if clinically indicated.

## 2023-12-15 NOTE — H&P ADULT - NSHPLABSRESULTS_GEN_ALL_CORE
EKG: NSR 67, 1st degree AVB, Q wave III, TWI aVL                              11.7   4.76  )-----------( 180      ( 18 Dec 2023 08:37 )             36.6       12-18    133<L>  |  100  |  47<H>  ----------------------------<  152<H>  4.7   |  23  |  1.62<H>    Ca    9.4      18 Dec 2023 08:26  Mg     2.4     12-18    TPro  6.9  /  Alb  4.0  /  TBili  0.6  /  DBili  x   /  AST  27  /  ALT  22  /  AlkPhos  69  12-18      PT/INR - ( 18 Dec 2023 08:37 )   PT: 10.9 sec;   INR: 0.95          PTT - ( 18 Dec 2023 08:37 )  PTT:33.9 sec    CARDIAC MARKERS ( 18 Dec 2023 08:26 )  x     / x     / 205 U/L / x     / 2.7 ng/mL        Urinalysis Basic - ( 18 Dec 2023 08:26 )    Color: x / Appearance: x / SG: x / pH: x  Gluc: 152 mg/dL / Ketone: x  / Bili: x / Urobili: x   Blood: x / Protein: x / Nitrite: x   Leuk Esterase: x / RBC: x / WBC x   Sq Epi: x / Non Sq Epi: x / Bacteria: x

## 2023-12-18 ENCOUNTER — TRANSCRIPTION ENCOUNTER (OUTPATIENT)
Age: 73
End: 2023-12-18

## 2023-12-18 ENCOUNTER — INPATIENT (INPATIENT)
Facility: HOSPITAL | Age: 73
LOS: 0 days | Discharge: HOME | DRG: 322 | End: 2023-12-19
Attending: INTERNAL MEDICINE | Admitting: INTERNAL MEDICINE
Payer: COMMERCIAL

## 2023-12-18 DIAGNOSIS — Z95.5 PRESENCE OF CORONARY ANGIOPLASTY IMPLANT AND GRAFT: Chronic | ICD-10-CM

## 2023-12-18 DIAGNOSIS — Z95.1 PRESENCE OF AORTOCORONARY BYPASS GRAFT: Chronic | ICD-10-CM

## 2023-12-18 LAB
A1C WITH ESTIMATED AVERAGE GLUCOSE RESULT: 7.4 % — HIGH (ref 4–5.6)
A1C WITH ESTIMATED AVERAGE GLUCOSE RESULT: 7.4 % — HIGH (ref 4–5.6)
ALBUMIN SERPL ELPH-MCNC: 4 G/DL — SIGNIFICANT CHANGE UP (ref 3.3–5)
ALBUMIN SERPL ELPH-MCNC: 4 G/DL — SIGNIFICANT CHANGE UP (ref 3.3–5)
ALP SERPL-CCNC: 69 U/L — SIGNIFICANT CHANGE UP (ref 40–120)
ALP SERPL-CCNC: 69 U/L — SIGNIFICANT CHANGE UP (ref 40–120)
ALT FLD-CCNC: 22 U/L — SIGNIFICANT CHANGE UP (ref 10–45)
ALT FLD-CCNC: 22 U/L — SIGNIFICANT CHANGE UP (ref 10–45)
ANION GAP SERPL CALC-SCNC: 10 MMOL/L — SIGNIFICANT CHANGE UP (ref 5–17)
ANION GAP SERPL CALC-SCNC: 10 MMOL/L — SIGNIFICANT CHANGE UP (ref 5–17)
APTT BLD: 33.9 SEC — SIGNIFICANT CHANGE UP (ref 24.5–35.6)
APTT BLD: 33.9 SEC — SIGNIFICANT CHANGE UP (ref 24.5–35.6)
AST SERPL-CCNC: 27 U/L — SIGNIFICANT CHANGE UP (ref 10–40)
AST SERPL-CCNC: 27 U/L — SIGNIFICANT CHANGE UP (ref 10–40)
BASOPHILS # BLD AUTO: 0.03 K/UL — SIGNIFICANT CHANGE UP (ref 0–0.2)
BASOPHILS # BLD AUTO: 0.03 K/UL — SIGNIFICANT CHANGE UP (ref 0–0.2)
BASOPHILS NFR BLD AUTO: 0.6 % — SIGNIFICANT CHANGE UP (ref 0–2)
BASOPHILS NFR BLD AUTO: 0.6 % — SIGNIFICANT CHANGE UP (ref 0–2)
BILIRUB SERPL-MCNC: 0.6 MG/DL — SIGNIFICANT CHANGE UP (ref 0.2–1.2)
BILIRUB SERPL-MCNC: 0.6 MG/DL — SIGNIFICANT CHANGE UP (ref 0.2–1.2)
BUN SERPL-MCNC: 47 MG/DL — HIGH (ref 7–23)
BUN SERPL-MCNC: 47 MG/DL — HIGH (ref 7–23)
CALCIUM SERPL-MCNC: 9.4 MG/DL — SIGNIFICANT CHANGE UP (ref 8.4–10.5)
CALCIUM SERPL-MCNC: 9.4 MG/DL — SIGNIFICANT CHANGE UP (ref 8.4–10.5)
CHLORIDE SERPL-SCNC: 100 MMOL/L — SIGNIFICANT CHANGE UP (ref 96–108)
CHLORIDE SERPL-SCNC: 100 MMOL/L — SIGNIFICANT CHANGE UP (ref 96–108)
CHOLEST SERPL-MCNC: 146 MG/DL — SIGNIFICANT CHANGE UP
CHOLEST SERPL-MCNC: 146 MG/DL — SIGNIFICANT CHANGE UP
CK MB CFR SERPL CALC: 2.7 NG/ML — SIGNIFICANT CHANGE UP (ref 0–6.7)
CK MB CFR SERPL CALC: 2.7 NG/ML — SIGNIFICANT CHANGE UP (ref 0–6.7)
CK SERPL-CCNC: 205 U/L — HIGH (ref 30–200)
CK SERPL-CCNC: 205 U/L — HIGH (ref 30–200)
CO2 SERPL-SCNC: 23 MMOL/L — SIGNIFICANT CHANGE UP (ref 22–31)
CO2 SERPL-SCNC: 23 MMOL/L — SIGNIFICANT CHANGE UP (ref 22–31)
CREAT SERPL-MCNC: 1.62 MG/DL — HIGH (ref 0.5–1.3)
CREAT SERPL-MCNC: 1.62 MG/DL — HIGH (ref 0.5–1.3)
EGFR: 44 ML/MIN/1.73M2 — LOW
EGFR: 44 ML/MIN/1.73M2 — LOW
EOSINOPHIL # BLD AUTO: 0.15 K/UL — SIGNIFICANT CHANGE UP (ref 0–0.5)
EOSINOPHIL # BLD AUTO: 0.15 K/UL — SIGNIFICANT CHANGE UP (ref 0–0.5)
EOSINOPHIL NFR BLD AUTO: 3.2 % — SIGNIFICANT CHANGE UP (ref 0–6)
EOSINOPHIL NFR BLD AUTO: 3.2 % — SIGNIFICANT CHANGE UP (ref 0–6)
ESTIMATED AVERAGE GLUCOSE: 166 MG/DL — HIGH (ref 68–114)
ESTIMATED AVERAGE GLUCOSE: 166 MG/DL — HIGH (ref 68–114)
GLUCOSE BLDC GLUCOMTR-MCNC: 134 MG/DL — HIGH (ref 70–99)
GLUCOSE BLDC GLUCOMTR-MCNC: 134 MG/DL — HIGH (ref 70–99)
GLUCOSE BLDC GLUCOMTR-MCNC: 152 MG/DL — HIGH (ref 70–99)
GLUCOSE BLDC GLUCOMTR-MCNC: 152 MG/DL — HIGH (ref 70–99)
GLUCOSE BLDC GLUCOMTR-MCNC: 156 MG/DL — HIGH (ref 70–99)
GLUCOSE BLDC GLUCOMTR-MCNC: 156 MG/DL — HIGH (ref 70–99)
GLUCOSE BLDC GLUCOMTR-MCNC: 165 MG/DL — HIGH (ref 70–99)
GLUCOSE BLDC GLUCOMTR-MCNC: 165 MG/DL — HIGH (ref 70–99)
GLUCOSE SERPL-MCNC: 152 MG/DL — HIGH (ref 70–99)
GLUCOSE SERPL-MCNC: 152 MG/DL — HIGH (ref 70–99)
HCT VFR BLD CALC: 36.6 % — LOW (ref 39–50)
HCT VFR BLD CALC: 36.6 % — LOW (ref 39–50)
HDLC SERPL-MCNC: 45 MG/DL — SIGNIFICANT CHANGE UP
HDLC SERPL-MCNC: 45 MG/DL — SIGNIFICANT CHANGE UP
HGB BLD-MCNC: 11.7 G/DL — LOW (ref 13–17)
HGB BLD-MCNC: 11.7 G/DL — LOW (ref 13–17)
IMM GRANULOCYTES NFR BLD AUTO: 0.2 % — SIGNIFICANT CHANGE UP (ref 0–0.9)
IMM GRANULOCYTES NFR BLD AUTO: 0.2 % — SIGNIFICANT CHANGE UP (ref 0–0.9)
INR BLD: 0.95 — SIGNIFICANT CHANGE UP (ref 0.85–1.18)
INR BLD: 0.95 — SIGNIFICANT CHANGE UP (ref 0.85–1.18)
ISTAT ACTK (ACTIVATED CLOTTING TIME KAOLIN): 287 SEC — HIGH (ref 74–137)
ISTAT ACTK (ACTIVATED CLOTTING TIME KAOLIN): 287 SEC — HIGH (ref 74–137)
LIPID PNL WITH DIRECT LDL SERPL: 75 MG/DL — SIGNIFICANT CHANGE UP
LIPID PNL WITH DIRECT LDL SERPL: 75 MG/DL — SIGNIFICANT CHANGE UP
LYMPHOCYTES # BLD AUTO: 0.91 K/UL — LOW (ref 1–3.3)
LYMPHOCYTES # BLD AUTO: 0.91 K/UL — LOW (ref 1–3.3)
LYMPHOCYTES # BLD AUTO: 19.1 % — SIGNIFICANT CHANGE UP (ref 13–44)
LYMPHOCYTES # BLD AUTO: 19.1 % — SIGNIFICANT CHANGE UP (ref 13–44)
MAGNESIUM SERPL-MCNC: 2.4 MG/DL — SIGNIFICANT CHANGE UP (ref 1.6–2.6)
MAGNESIUM SERPL-MCNC: 2.4 MG/DL — SIGNIFICANT CHANGE UP (ref 1.6–2.6)
MCHC RBC-ENTMCNC: 30.6 PG — SIGNIFICANT CHANGE UP (ref 27–34)
MCHC RBC-ENTMCNC: 30.6 PG — SIGNIFICANT CHANGE UP (ref 27–34)
MCHC RBC-ENTMCNC: 32 GM/DL — SIGNIFICANT CHANGE UP (ref 32–36)
MCHC RBC-ENTMCNC: 32 GM/DL — SIGNIFICANT CHANGE UP (ref 32–36)
MCV RBC AUTO: 95.8 FL — SIGNIFICANT CHANGE UP (ref 80–100)
MCV RBC AUTO: 95.8 FL — SIGNIFICANT CHANGE UP (ref 80–100)
MONOCYTES # BLD AUTO: 0.56 K/UL — SIGNIFICANT CHANGE UP (ref 0–0.9)
MONOCYTES # BLD AUTO: 0.56 K/UL — SIGNIFICANT CHANGE UP (ref 0–0.9)
MONOCYTES NFR BLD AUTO: 11.8 % — SIGNIFICANT CHANGE UP (ref 2–14)
MONOCYTES NFR BLD AUTO: 11.8 % — SIGNIFICANT CHANGE UP (ref 2–14)
NEUTROPHILS # BLD AUTO: 3.1 K/UL — SIGNIFICANT CHANGE UP (ref 1.8–7.4)
NEUTROPHILS # BLD AUTO: 3.1 K/UL — SIGNIFICANT CHANGE UP (ref 1.8–7.4)
NEUTROPHILS NFR BLD AUTO: 65.1 % — SIGNIFICANT CHANGE UP (ref 43–77)
NEUTROPHILS NFR BLD AUTO: 65.1 % — SIGNIFICANT CHANGE UP (ref 43–77)
NON HDL CHOLESTEROL: 101 MG/DL — SIGNIFICANT CHANGE UP
NON HDL CHOLESTEROL: 101 MG/DL — SIGNIFICANT CHANGE UP
NRBC # BLD: 0 /100 WBCS — SIGNIFICANT CHANGE UP (ref 0–0)
NRBC # BLD: 0 /100 WBCS — SIGNIFICANT CHANGE UP (ref 0–0)
PLATELET # BLD AUTO: 180 K/UL — SIGNIFICANT CHANGE UP (ref 150–400)
PLATELET # BLD AUTO: 180 K/UL — SIGNIFICANT CHANGE UP (ref 150–400)
POTASSIUM SERPL-MCNC: 4.7 MMOL/L — SIGNIFICANT CHANGE UP (ref 3.5–5.3)
POTASSIUM SERPL-MCNC: 4.7 MMOL/L — SIGNIFICANT CHANGE UP (ref 3.5–5.3)
POTASSIUM SERPL-SCNC: 4.7 MMOL/L — SIGNIFICANT CHANGE UP (ref 3.5–5.3)
POTASSIUM SERPL-SCNC: 4.7 MMOL/L — SIGNIFICANT CHANGE UP (ref 3.5–5.3)
PROT SERPL-MCNC: 6.9 G/DL — SIGNIFICANT CHANGE UP (ref 6–8.3)
PROT SERPL-MCNC: 6.9 G/DL — SIGNIFICANT CHANGE UP (ref 6–8.3)
PROTHROM AB SERPL-ACNC: 10.9 SEC — SIGNIFICANT CHANGE UP (ref 9.5–13)
PROTHROM AB SERPL-ACNC: 10.9 SEC — SIGNIFICANT CHANGE UP (ref 9.5–13)
RBC # BLD: 3.82 M/UL — LOW (ref 4.2–5.8)
RBC # BLD: 3.82 M/UL — LOW (ref 4.2–5.8)
RBC # FLD: 15.8 % — HIGH (ref 10.3–14.5)
RBC # FLD: 15.8 % — HIGH (ref 10.3–14.5)
SODIUM SERPL-SCNC: 133 MMOL/L — LOW (ref 135–145)
SODIUM SERPL-SCNC: 133 MMOL/L — LOW (ref 135–145)
TRIGL SERPL-MCNC: 131 MG/DL — SIGNIFICANT CHANGE UP
TRIGL SERPL-MCNC: 131 MG/DL — SIGNIFICANT CHANGE UP
WBC # BLD: 4.76 K/UL — SIGNIFICANT CHANGE UP (ref 3.8–10.5)
WBC # BLD: 4.76 K/UL — SIGNIFICANT CHANGE UP (ref 3.8–10.5)
WBC # FLD AUTO: 4.76 K/UL — SIGNIFICANT CHANGE UP (ref 3.8–10.5)
WBC # FLD AUTO: 4.76 K/UL — SIGNIFICANT CHANGE UP (ref 3.8–10.5)

## 2023-12-18 PROCEDURE — 93459 L HRT ART/GRFT ANGIO: CPT | Mod: 26,59

## 2023-12-18 PROCEDURE — 93010 ELECTROCARDIOGRAM REPORT: CPT

## 2023-12-18 PROCEDURE — 92928 PRQ TCAT PLMT NTRAC ST 1 LES: CPT | Mod: RC

## 2023-12-18 PROCEDURE — 99152 MOD SED SAME PHYS/QHP 5/>YRS: CPT

## 2023-12-18 RX ORDER — DEXTROSE 50 % IN WATER 50 %
12.5 SYRINGE (ML) INTRAVENOUS ONCE
Refills: 0 | Status: DISCONTINUED | OUTPATIENT
Start: 2023-12-18 | End: 2023-12-19

## 2023-12-18 RX ORDER — RANOLAZINE 500 MG/1
1 TABLET, FILM COATED, EXTENDED RELEASE ORAL
Refills: 0 | DISCHARGE

## 2023-12-18 RX ORDER — CLOPIDOGREL BISULFATE 75 MG/1
75 TABLET, FILM COATED ORAL DAILY
Refills: 0 | Status: DISCONTINUED | OUTPATIENT
Start: 2023-12-18 | End: 2023-12-19

## 2023-12-18 RX ORDER — FUROSEMIDE 40 MG
1 TABLET ORAL
Refills: 0 | DISCHARGE

## 2023-12-18 RX ORDER — RIVAROXABAN 15 MG-20MG
15 KIT ORAL
Refills: 0 | Status: DISCONTINUED | OUTPATIENT
Start: 2023-12-18 | End: 2023-12-18

## 2023-12-18 RX ORDER — ASPIRIN/CALCIUM CARB/MAGNESIUM 324 MG
325 TABLET ORAL ONCE
Refills: 0 | Status: COMPLETED | OUTPATIENT
Start: 2023-12-18 | End: 2023-12-18

## 2023-12-18 RX ORDER — GLUCAGON INJECTION, SOLUTION 0.5 MG/.1ML
1 INJECTION, SOLUTION SUBCUTANEOUS ONCE
Refills: 0 | Status: DISCONTINUED | OUTPATIENT
Start: 2023-12-18 | End: 2023-12-19

## 2023-12-18 RX ORDER — ASPIRIN/CALCIUM CARB/MAGNESIUM 324 MG
81 TABLET ORAL DAILY
Refills: 0 | Status: DISCONTINUED | OUTPATIENT
Start: 2023-12-19 | End: 2023-12-19

## 2023-12-18 RX ORDER — SODIUM CHLORIDE 9 MG/ML
250 INJECTION INTRAMUSCULAR; INTRAVENOUS; SUBCUTANEOUS ONCE
Refills: 0 | Status: DISCONTINUED | OUTPATIENT
Start: 2023-12-18 | End: 2023-12-18

## 2023-12-18 RX ORDER — DEXTROSE 50 % IN WATER 50 %
25 SYRINGE (ML) INTRAVENOUS ONCE
Refills: 0 | Status: DISCONTINUED | OUTPATIENT
Start: 2023-12-18 | End: 2023-12-19

## 2023-12-18 RX ORDER — ISOSORBIDE MONONITRATE 60 MG/1
1 TABLET, EXTENDED RELEASE ORAL
Refills: 0 | DISCHARGE

## 2023-12-18 RX ORDER — CLOPIDOGREL BISULFATE 75 MG/1
75 TABLET, FILM COATED ORAL DAILY
Refills: 0 | Status: DISCONTINUED | OUTPATIENT
Start: 2023-12-18 | End: 2023-12-18

## 2023-12-18 RX ORDER — LOSARTAN POTASSIUM 100 MG/1
1 TABLET, FILM COATED ORAL
Qty: 0 | Refills: 0 | DISCHARGE

## 2023-12-18 RX ORDER — ISOSORBIDE MONONITRATE 60 MG/1
60 TABLET, EXTENDED RELEASE ORAL DAILY
Refills: 0 | Status: DISCONTINUED | OUTPATIENT
Start: 2023-12-18 | End: 2023-12-19

## 2023-12-18 RX ORDER — METOPROLOL TARTRATE 50 MG
1 TABLET ORAL
Refills: 0 | DISCHARGE

## 2023-12-18 RX ORDER — SODIUM CHLORIDE 9 MG/ML
1000 INJECTION, SOLUTION INTRAVENOUS
Refills: 0 | Status: DISCONTINUED | OUTPATIENT
Start: 2023-12-18 | End: 2023-12-19

## 2023-12-18 RX ORDER — RIVAROXABAN 15 MG-20MG
15 KIT ORAL
Refills: 0 | Status: DISCONTINUED | OUTPATIENT
Start: 2023-12-18 | End: 2023-12-19

## 2023-12-18 RX ORDER — SODIUM CHLORIDE 9 MG/ML
500 INJECTION INTRAMUSCULAR; INTRAVENOUS; SUBCUTANEOUS
Refills: 0 | Status: DISCONTINUED | OUTPATIENT
Start: 2023-12-18 | End: 2023-12-18

## 2023-12-18 RX ORDER — SODIUM CHLORIDE 9 MG/ML
500 INJECTION INTRAMUSCULAR; INTRAVENOUS; SUBCUTANEOUS ONCE
Refills: 0 | Status: COMPLETED | OUTPATIENT
Start: 2023-12-18 | End: 2023-12-18

## 2023-12-18 RX ORDER — INFLUENZA VIRUS VACCINE 15; 15; 15; 15 UG/.5ML; UG/.5ML; UG/.5ML; UG/.5ML
0.7 SUSPENSION INTRAMUSCULAR ONCE
Refills: 0 | Status: DISCONTINUED | OUTPATIENT
Start: 2023-12-18 | End: 2023-12-19

## 2023-12-18 RX ORDER — PIOGLITAZONE HYDROCHLORIDE 15 MG/1
1 TABLET ORAL
Refills: 0 | DISCHARGE

## 2023-12-18 RX ORDER — METOPROLOL TARTRATE 50 MG
25 TABLET ORAL DAILY
Refills: 0 | Status: DISCONTINUED | OUTPATIENT
Start: 2023-12-18 | End: 2023-12-19

## 2023-12-18 RX ORDER — FUROSEMIDE 40 MG
40 TABLET ORAL DAILY
Refills: 0 | Status: DISCONTINUED | OUTPATIENT
Start: 2023-12-18 | End: 2023-12-19

## 2023-12-18 RX ORDER — DEXTROSE 50 % IN WATER 50 %
15 SYRINGE (ML) INTRAVENOUS ONCE
Refills: 0 | Status: DISCONTINUED | OUTPATIENT
Start: 2023-12-18 | End: 2023-12-19

## 2023-12-18 RX ORDER — ASCORBIC ACID 60 MG
1 TABLET,CHEWABLE ORAL
Refills: 0 | DISCHARGE

## 2023-12-18 RX ORDER — SODIUM CHLORIDE 9 MG/ML
1000 INJECTION INTRAMUSCULAR; INTRAVENOUS; SUBCUTANEOUS
Refills: 0 | Status: DISCONTINUED | OUTPATIENT
Start: 2023-12-18 | End: 2023-12-19

## 2023-12-18 RX ORDER — CHOLECALCIFEROL (VITAMIN D3) 125 MCG
1 CAPSULE ORAL
Refills: 0 | DISCHARGE

## 2023-12-18 RX ORDER — LOSARTAN POTASSIUM 100 MG/1
25 TABLET, FILM COATED ORAL DAILY
Refills: 0 | Status: DISCONTINUED | OUTPATIENT
Start: 2023-12-18 | End: 2023-12-19

## 2023-12-18 RX ORDER — INSULIN LISPRO 100/ML
VIAL (ML) SUBCUTANEOUS
Refills: 0 | Status: DISCONTINUED | OUTPATIENT
Start: 2023-12-18 | End: 2023-12-19

## 2023-12-18 RX ORDER — ATORVASTATIN CALCIUM 80 MG/1
40 TABLET, FILM COATED ORAL AT BEDTIME
Refills: 0 | Status: DISCONTINUED | OUTPATIENT
Start: 2023-12-18 | End: 2023-12-19

## 2023-12-18 RX ADMIN — SODIUM CHLORIDE 100 MILLILITER(S): 9 INJECTION INTRAMUSCULAR; INTRAVENOUS; SUBCUTANEOUS at 14:26

## 2023-12-18 RX ADMIN — Medication 40 MILLIGRAM(S): at 12:19

## 2023-12-18 RX ADMIN — RIVAROXABAN 15 MILLIGRAM(S): KIT at 20:35

## 2023-12-18 RX ADMIN — SODIUM CHLORIDE 50 MILLILITER(S): 9 INJECTION INTRAMUSCULAR; INTRAVENOUS; SUBCUTANEOUS at 09:34

## 2023-12-18 RX ADMIN — CLOPIDOGREL BISULFATE 75 MILLIGRAM(S): 75 TABLET, FILM COATED ORAL at 12:17

## 2023-12-18 RX ADMIN — ATORVASTATIN CALCIUM 40 MILLIGRAM(S): 80 TABLET, FILM COATED ORAL at 22:43

## 2023-12-18 RX ADMIN — SODIUM CHLORIDE 666.67 MILLILITER(S): 9 INJECTION INTRAMUSCULAR; INTRAVENOUS; SUBCUTANEOUS at 09:42

## 2023-12-18 RX ADMIN — LOSARTAN POTASSIUM 25 MILLIGRAM(S): 100 TABLET, FILM COATED ORAL at 20:34

## 2023-12-18 RX ADMIN — ISOSORBIDE MONONITRATE 60 MILLIGRAM(S): 60 TABLET, EXTENDED RELEASE ORAL at 12:22

## 2023-12-18 RX ADMIN — Medication 1: at 17:44

## 2023-12-18 RX ADMIN — Medication 25 MILLIGRAM(S): at 12:21

## 2023-12-18 RX ADMIN — Medication 325 MILLIGRAM(S): at 09:36

## 2023-12-18 NOTE — DISCHARGE NOTE PROVIDER - CARE PROVIDER_API CALL
Kristofer Sharpe S  Cardiovascular Disease  [address], [address]  [place], [place] [facility]  Phone: [phone]  Fax: [phone]  Follow Up Time: 1 week   Kristofer Sharpe S  Cardiovascular Disease  [address], [address]  [place], [place] [facility]  Phone: [phone]  Fax: [phone]  Follow Up Time: 1 week

## 2023-12-18 NOTE — PATIENT PROFILE ADULT - FALL HARM RISK - HARM RISK INTERVENTIONS
Communicate Risk of Fall with Harm to all staff/Reinforce activity limits and safety measures with patient and family/Review medications for side effects contributing to fall risk/Tailored Fall Risk Interventions/Visual Cue: Yellow wristband and red socks/Bed in lowest position, wheels locked, appropriate side rails in place/Call bell, personal items and telephone in reach/Instruct patient to call for assistance before getting out of bed or chair/Non-slip footwear when patient is out of bed/[place] to call system/Physically safe environment - no spills, clutter or unnecessary equipment/Purposeful Proactive Rounding/Room/bathroom lighting operational, light cord in reach Communicate Risk of Fall with Harm to all staff/Reinforce activity limits and safety measures with patient and family/Review medications for side effects contributing to fall risk/Tailored Fall Risk Interventions/Visual Cue: Yellow wristband and red socks/Bed in lowest position, wheels locked, appropriate side rails in place/Call bell, personal items and telephone in reach/Instruct patient to call for assistance before getting out of bed or chair/Non-slip footwear when patient is out of bed/[place] to call system/Physically safe environment - no spills, clutter or unnecessary equipment/Purposeful Proactive Rounding/Room/bathroom lighting operational, light cord in reach

## 2023-12-18 NOTE — DISCHARGE NOTE PROVIDER - NSDCMRMEDTOKEN_GEN_ALL_CORE_FT
cholecalciferol 50 mcg (2000 intl units) oral tablet: 1 tab(s) orally once a day  clopidogrel 75 mg oral tablet: 1 tab(s) orally once a day  ezetimibe-simvastatin 10 mg-80 mg oral tablet: 1 tab(s) orally once a day  glipiZIDE 5 mg oral tablet: 1 tab(s) orally 3 times a day  Imdur 60 mg oral tablet, extended release: 1 tab(s) orally once a day  Lasix 40 mg oral tablet: 1 tab(s) orally once a day  losartan 25 mg oral tablet: 1 tab(s) orally 2 times a day  metoprolol succinate 25 mg oral capsule, extended release: 1 cap(s) orally once a day  pioglitazone 45 mg oral tablet: 1 tab(s) orally once a day  Vitamin C 500 mg oral capsule: 1 cap(s) orally once a day  Xarelto 15 mg oral tablet: 1 tab(s) orally once a day   aspirin 81 mg oral delayed release tablet: 1 tab(s) orally once a day for 14 days and then stop taking  Cardiac Rehab: 2-3x per week for 12 weeks. Dx; CAD s/p PCI. Cardiologist: Dr. Sharpe  cholecalciferol 50 mcg (2000 intl units) oral tablet: 1 tab(s) orally once a day  clopidogrel 75 mg oral tablet: 1 tab(s) orally once a day  ezetimibe-simvastatin 10 mg-80 mg oral tablet: 1 tab(s) orally once a day  glipiZIDE 5 mg oral tablet: 1 tab(s) orally 3 times a day  Imdur 60 mg oral tablet, extended release: 1 tab(s) orally once a day  Lasix 40 mg oral tablet: 1 tab(s) orally once a day  losartan 25 mg oral tablet: 1 tab(s) orally 2 times a day  metoprolol succinate 25 mg oral capsule, extended release: 1 cap(s) orally once a day  pioglitazone 45 mg oral tablet: 1 tab(s) orally once a day  Vitamin C 500 mg oral capsule: 1 cap(s) orally once a day  Xarelto 15 mg oral tablet: 1 tab(s) orally once a day

## 2023-12-18 NOTE — DISCHARGE NOTE PROVIDER - NSDCFUSCHEDAPPT_GEN_ALL_CORE_FT
Kristofer Sharpe  [facility] Physician [facility]  [facility] [address]  Scheduled Appointment: 01/04/2024     Kristofer Sharpe  [facility] Physician [facility]  [facility] [address]  Scheduled Appointment: 01/04/2024

## 2023-12-18 NOTE — DISCHARGE NOTE PROVIDER - CARE PROVIDERS DIRECT ADDRESSES
[email or web address]

## 2023-12-18 NOTE — DISCHARGE NOTE PROVIDER - NSDCCPCAREPLAN_GEN_ALL_CORE_FT
9/14/2023         RE: Abbey Omer  [address] [address]        Dear Colleague,    Thank you for referring your patient, Abbey Omer, to the [facility]. Please see a copy of my visit note below.    Virtual Visit Details    T    The patient is a 73-year-old woman with a new diagnosis of a left breast cancer.  On August 2 she underwent a screening mammogram which demonstrated an asymmetry in her left breast.  On August 21 she underwent a diagnostic mammogram which demonstrated an asymmetry in her left breast measuring approximately 3.5 cm.  She also had a breast ultrasound which demonstrated a 2.1 cm mass.  By ultrasound her lymph nodes were normal.  On August 30 she underwent a contrast-enhanced mammography which demonstrated a spiculated mass in 6.2 cm of enhancement.  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  There was no mention of DCIS in the pathology report.  She denies any palpable breast lumps.  She denies any symptoms of metastatic disease.  She denies any previous breast problems.    Her past medical history is significant for coronary artery disease and obstructive sleep apnea.    Her family history is negative for breast cancer.    Impression left breast cancer    Plan: I talked to the patient today about the discrepancy in the size of this mass from 2.1 cm by ultrasound to 6.2 cm by contrast-enhanced mammography.  If she desires breast conservation then I have recommended an image guided needle biopsy of the enhancement noted on her contrast-enhanced tomography.  If that area is all malignant then she would likely need a mastectomy.  I did recommend a sentinel lymph node biopsy.  I informed her that endocrine therapy would be recommended but a decision regarding chemotherapy would not be made until after her surgery.  We will plan to obtain a another  breast biopsy on her and then discussed the findings and her ultimate treatment.    The video started at 8:46 AM and ended at 9:05 AM.  The total visit time was 45 minutes which included reviewing her imaging the video visit and coordinating her care      Sincerely,        Mikey Vega MD   PRINCIPAL DISCHARGE DIAGNOSIS  Diagnosis: CAD (coronary artery disease)  Assessment and Plan of Treatment: You were found to have blockages in the arteries of your heart, also known as Coronary Artery Disease. You underwent a cardiac catheterization on ___ and received a stent to the ___ artery.  PLEASE CONTINUE ASPIRIN 81MG DAILY, PLAVIX 75MG DAILY, and XARELTO 15MG DAILY FOR ______. DO NOT STOP THESE MEDICATIONS FOR ANY REASON AS THEY ARE KEEPING YOUR STENT OPEN AND PREVENTING A HEART ATTACK.   Aspirin and Plavix can put you at increased risk of bleeding; please avoid NSAIDS (such as Motrin, Advil, Ibuprofen, Naproxen, or Aleve, as these medications may further your risk of bleeding  Avoid strenuous activity or heavy lifting anything more than 5lbs for the next five days. Do not take a bath or swim for the next five days; you may shower. For any bleeding or hematoma formation (hardened blood collection under the skin) at the access site of your LEFT WRIST, please hold pressure and go to the emergency room.   Please follow up with  ___ in 1-2 weeks.   For recurrent chest pain, please call your doctor or go to the emergency room.  We have provided you with a prescription for cardiac rehab which is medically supervised exercise program for your heart and has been shown to improve the quantity and quality of life of people with heart disease like yours. You should attend cardiac rehab 3 times per week for 12 weeks. We have provided you with a list of nearby facilities. Please call your insurance carrier to determine which of these facilities are covered under your plan. Please bring this prescription with you to your follow up appointment with your cardiologist who can then further assist you to enroll into a cardiac rehab program.        SECONDARY DISCHARGE DIAGNOSES  Diagnosis: HTN (hypertension)  Assessment and Plan of Treatment: Please continue Metoprolol XL 25mg daily, Losartan 25mg daily, Imdur 60mg daily as listed to keep your blood pressure controlled. For blood pressure that is too high or too low please see your doctor or go to the emergency room as necessary.      Diagnosis: HLD (hyperlipidemia)  Assessment and Plan of Treatment: Please continue Zetia-Simvastatin 10-80mg daily at bedtime to keep your cholesterol low. High cholesterol contributes to heart disease.    Diagnosis: Type 2 diabetes mellitus  Assessment and Plan of Treatment: Your Hemoglobin A1c is 7.4 and your goal A1c is less than 7.0%. This number measures your average blood sugar level over the last three months.  Please continue Glipizide 5mg 3x daily and Pioglitazone 45mg daily as listed for diabetes. Maintain a low carbohydrate, low sugar diet, exercise, monitor your fingerstick blood sugars regarly and follow up with your Endocrinologist/Primary Care Doctor.      Diagnosis: (HFpEF) heart failure with preserved ejection fraction  Assessment and Plan of Treatment: Diastolic heart failure, also known as heart failure with preserved ejection fraction (HFpEF), is a condition in which your heart’s main pumping chamber (left ventricle) becomes stiff and unable to fill properly.  When the left side of your heart stiffens, your heart: can't relax properly between beats, doesn't fill up with as much blood as it should, and pumps out less blood to the rest of your body than a healthy heart would.   As a result, you experience symptoms of heart failure. You might feel short of breath or fatigued (tired, no matter how much you rest). Your breathing may get worse at night when you try to lay flat. You may also notice swelling in your belly or legs (edema). These symptoms might get worse over time.  -Please continue Lasix 40mg daily to prevent fluid build up in the body.  -Please continue Metoprolol XL 25mg and Losartan 25mg daily to help strenghten your heart muscle  -Avoid drinking more than 1.5L of fluid daily and maintain a low salt diet (max 2grams daily).  -Please weigh yourself daily, for any significant increases in daily weight of 2lbs/day or 5lbs/week with associated swelling in the legs or abdomen and/or shortness of breath, please call your doctor or go to the emergency room.  -Follow up with  __ in 1 week.       PRINCIPAL DISCHARGE DIAGNOSIS  Diagnosis: CAD (coronary artery disease)  Assessment and Plan of Treatment: You were found to have blockages in the arteries of your heart, also known as Coronary Artery Disease. You underwent a cardiac catheterization on 12/18/23 and received a stent to the SVG-RPDA graft.  PLEASE CONTINUE ASPIRIN 81MG DAILY, PLAVIX 75MG DAILY, and XARELTO 15MG DAILY FOR 2 weeks and then stop aspirin. DO NOT STOP THESE MEDICATIONS FOR ANY REASON AS THEY ARE KEEPING YOUR STENT OPEN AND PREVENTING A HEART ATTACK.   Aspirin and Plavix can put you at increased risk of bleeding; please avoid NSAIDS (such as Motrin, Advil, Ibuprofen, Naproxen, or Aleve, as these medications may further your risk of bleeding  Avoid strenuous activity or heavy lifting anything more than 5lbs for the next five days. Do not take a bath or swim for the next five days; you may shower. For any bleeding or hematoma formation (hardened blood collection under the skin) at the access site of your LEFT WRIST, please hold pressure and go to the emergency room.   Please follow up with Dr. Sharpe in 1-2 weeks.   For recurrent chest pain, please call your doctor or go to the emergency room.  We have provided you with a prescription for cardiac rehab which is medically supervised exercise program for your heart and has been shown to improve the quantity and quality of life of people with heart disease like yours. You should attend cardiac rehab 3 times per week for 12 weeks. We have provided you with a list of nearby facilities. Please call your insurance carrier to determine which of these facilities are covered under your plan. Please bring this prescription with you to your follow up appointment with your cardiologist who can then further assist you to enroll into a cardiac rehab program.        SECONDARY DISCHARGE DIAGNOSES  Diagnosis: HTN (hypertension)  Assessment and Plan of Treatment: Please continue Metoprolol XL 25mg daily, Losartan 25mg daily, Imdur 60mg daily as listed to keep your blood pressure controlled. For blood pressure that is too high or too low please see your doctor or go to the emergency room as necessary.      Diagnosis: HLD (hyperlipidemia)  Assessment and Plan of Treatment: Please continue Zetia-Simvastatin 10-80mg daily at bedtime to keep your cholesterol low. High cholesterol contributes to heart disease.    Diagnosis: Type 2 diabetes mellitus  Assessment and Plan of Treatment: Your Hemoglobin A1c is 7.4 and your goal A1c is less than 7.0%. This number measures your average blood sugar level over the last three months.  Please continue Glipizide 5mg 3x daily and Pioglitazone 45mg daily as listed for diabetes. Maintain a low carbohydrate, low sugar diet, exercise, monitor your fingerstick blood sugars regarly and follow up with your Endocrinologist/Primary Care Doctor.      Diagnosis: (HFpEF) heart failure with preserved ejection fraction  Assessment and Plan of Treatment: Diastolic heart failure, also known as heart failure with preserved ejection fraction (HFpEF), is a condition in which your heart’s main pumping chamber (left ventricle) becomes stiff and unable to fill properly.  When the left side of your heart stiffens, your heart: can't relax properly between beats, doesn't fill up with as much blood as it should, and pumps out less blood to the rest of your body than a healthy heart would.   As a result, you experience symptoms of heart failure. You might feel short of breath or fatigued (tired, no matter how much you rest). Your breathing may get worse at night when you try to lay flat. You may also notice swelling in your belly or legs (edema). These symptoms might get worse over time.  -Please continue Lasix 40mg daily to prevent fluid build up in the body.  -Please continue Metoprolol XL 25mg and Losartan 25mg daily to help strenghten your heart muscle  -Avoid drinking more than 1.5L of fluid daily and maintain a low salt diet (max 2grams daily).  -Please weigh yourself daily, for any significant increases in daily weight of 2lbs/day or 5lbs/week with associated swelling in the legs or abdomen and/or shortness of breath, please call your doctor or go to the emergency room.  -Follow up with  in 1 week.

## 2023-12-18 NOTE — DISCHARGE NOTE PROVIDER - HOSPITAL COURSE
73M, obese, with hx of HTN, HLD, DM2, HFpEF (EF 60%, 6/03/23), pAFib (on Xarelto), CAD s/p CABG x2 (LIMA-LAD, reverse SVG-PDA, 2017) & multiple PCIs (most recent AUDRA OM1, dLM, pLCx, 6/06/23), and Stage 3a CKD (baseline Cr 1.8-2), who initially presented to his outpatient cardiologist Dr. Sharpe c/o progressive COTA and chest burning sensation.     Pt is now s/p C (12/18/23): AUDRA to SVG-RPDA (focal 80% stenosis). LIMA-LAD patent; EDP 18 -- via LRA [IC: Dr. JAVED Galeas/ Fellow: Saira Adorno].      Pt seen and examined at bedside this AM without any complaints or events overnight, VSS, labs and telemetry reviewed and pt stable for discharge as discussed with  ___.     GLP-1 receptor antagonist/SGLT2 inhibitor meds discussed with patient and encouraged to discuss further with PMD or Endocrinologist at next visit.     Pt has received appropriate discharge instructions, including medication regimen, access site management and follow up with  __ in 1-2 weeks.     Pt's discharge copies detailed cardiovasvular history, medications, testing/treatments, OR pt has created a patient portal account and instructed to provide their records at their 1st appointment.    Discharge Cr:  Discharge Wt:     CV MEDICATIONS UPON DISCHARGE  Triple Therapy: ASA 81mg QD + 75mg QD + Xarelto 15mg QD  Zetia-Simvastatin 10mg-80mg QHS    Beta-blocker: Metoprolol XL 25mg QD  ACE/ARB/ARNI: Losartan 25mg QD   Vasodilator: Imdur 60mg QD   Diuretic: Lasix 40mg PO QD      DIABETIC REGIMEN UPON DISCHARGE -- Hgb A1c 7.4   Glipizide 5mg TID   Pioglitazone 45mg QD    OTHER MEDICATIONS: Cholecalciferol 50mcg QD and Vitamin C 500mg QD    Cardiac Rehab (STEMI/NSTEMI/ACS/Unstable Angina/CHF/Post PCI):            *Education on benefits of Cardiac Rehab provided to patient: YES         *Referral and Prescription Given for Cardiac Rehab : YES         *Pt given list of locations & instructed to contact their insurance company to review list of participating providers         73M, obese, with hx of HTN, HLD, DM2, HFpEF (EF 60%, 6/03/23), pAFib (on Xarelto), CAD s/p CABG x2 (LIMA-LAD, reverse SVG-PDA, 2017) & multiple PCIs (most recent AUDRA OM1, dLM, pLCx, 6/06/23), and Stage 3a CKD (baseline Cr 1.8-2), who initially presented to his outpatient cardiologist Dr. Sharpe c/o progressive COTA and chest burning sensation.     Pt is now s/p C (12/18/23): AUDRA to SVG-RPDA (focal 80% stenosis). LIMA-LAD patent; EDP 18 -- via LRA [IC: Dr. JAVED Galeas/ Fellow: Saira Adorno].      Pt seen and examined at bedside this AM without any complaints or events overnight, VSS, labs and telemetry reviewed and pt stable for discharge as discussed with Dr. Neal.    GLP-1 receptor antagonist/SGLT2 inhibitor meds discussed with patient and encouraged to discuss further with PMD or Endocrinologist at next visit.     Pt has received appropriate discharge instructions, including medication regimen, access site management and follow up with Dr. Sharpe in 1-2 weeks. Pt insists on getting refills of medications from Dr. Sharpe and sent to mail order pharmacy. Pt prescribed one month supply of medications to [facility] and Dr. Sharpe made aware he will have to prescribe refills of Xarelto and Plavix to patient.     Pt's discharge copies detailed cardiovascular history, medications, testing/treatments, OR pt has created a patient portal account and instructed to provide their records at their 1st appointment.    Discharge Cr: 1.59  Discharge Wt: 245 lbs    CV MEDICATIONS UPON DISCHARGE  Triple Therapy: ASA 81mg QD + 75mg QD + Xarelto 15mg QD for 2 weeks then drop Aspirin  Zetia-Simvastatin 10mg-80mg QHS    Beta-blocker: Metoprolol XL 25mg QD  ACE/ARB/ARNI: Losartan 25mg QD   Vasodilator: Imdur 60mg QD   Diuretic: Lasix 40mg PO QD      DIABETIC REGIMEN UPON DISCHARGE -- Hgb A1c 7.4   Glipizide 5mg TID   Pioglitazone 45mg QD    OTHER MEDICATIONS: Cholecalciferol 50mcg QD and Vitamin C 500mg QD    Cardiac Rehab (STEMI/NSTEMI/ACS/Unstable Angina/CHF/Post PCI):            *Education on benefits of Cardiac Rehab provided to patient: YES         *Referral and Prescription Given for Cardiac Rehab : YES         *Pt given list of locations & instructed to contact their insurance company to review list of participating providers

## 2023-12-18 NOTE — DISCHARGE NOTE PROVIDER - ATTENDING DISCHARGE PHYSICAL EXAMINATION:
73M, obese, with hx of HTN, HLD, DM2, HFpEF (EF 60%, 6/03/23), pAFib (on Xarelto), CAD s/p CABG x2 (LIMA-LAD, reverse SVG-PDA, 2017) & multiple PCIs (most recent AUDRA OM1, dLM, pLCx, 6/06/23), and Stage 3a CKD (baseline Cr 1.8-2), who initially presented to his outpatient cardiologist Dr. Sharpe c/o progressive COTA and chest burning sensation.     1. CAD  s/p C (12/18/23): AUDRA to SVG-RPDA (focal 80% stenosis). LIMA-LAD patent.

## 2023-12-19 ENCOUNTER — TRANSCRIPTION ENCOUNTER (OUTPATIENT)
Age: 73
End: 2023-12-19

## 2023-12-19 VITALS
SYSTOLIC BLOOD PRESSURE: 135 MMHG | RESPIRATION RATE: 18 BRPM | OXYGEN SATURATION: 97 % | HEART RATE: 97 BPM | DIASTOLIC BLOOD PRESSURE: 76 MMHG

## 2023-12-19 LAB
ANION GAP SERPL CALC-SCNC: 9 MMOL/L — SIGNIFICANT CHANGE UP (ref 5–17)
ANION GAP SERPL CALC-SCNC: 9 MMOL/L — SIGNIFICANT CHANGE UP (ref 5–17)
BUN SERPL-MCNC: 42 MG/DL — HIGH (ref 7–23)
BUN SERPL-MCNC: 42 MG/DL — HIGH (ref 7–23)
CALCIUM SERPL-MCNC: 8.8 MG/DL — SIGNIFICANT CHANGE UP (ref 8.4–10.5)
CALCIUM SERPL-MCNC: 8.8 MG/DL — SIGNIFICANT CHANGE UP (ref 8.4–10.5)
CHLORIDE SERPL-SCNC: 107 MMOL/L — SIGNIFICANT CHANGE UP (ref 96–108)
CHLORIDE SERPL-SCNC: 107 MMOL/L — SIGNIFICANT CHANGE UP (ref 96–108)
CO2 SERPL-SCNC: 22 MMOL/L — SIGNIFICANT CHANGE UP (ref 22–31)
CO2 SERPL-SCNC: 22 MMOL/L — SIGNIFICANT CHANGE UP (ref 22–31)
CREAT SERPL-MCNC: 1.59 MG/DL — HIGH (ref 0.5–1.3)
CREAT SERPL-MCNC: 1.59 MG/DL — HIGH (ref 0.5–1.3)
EGFR: 45 ML/MIN/1.73M2 — LOW
EGFR: 45 ML/MIN/1.73M2 — LOW
GLUCOSE BLDC GLUCOMTR-MCNC: 130 MG/DL — HIGH (ref 70–99)
GLUCOSE BLDC GLUCOMTR-MCNC: 130 MG/DL — HIGH (ref 70–99)
GLUCOSE BLDC GLUCOMTR-MCNC: 283 MG/DL — HIGH (ref 70–99)
GLUCOSE BLDC GLUCOMTR-MCNC: 283 MG/DL — HIGH (ref 70–99)
GLUCOSE SERPL-MCNC: 119 MG/DL — HIGH (ref 70–99)
GLUCOSE SERPL-MCNC: 119 MG/DL — HIGH (ref 70–99)
HCT VFR BLD CALC: 33.6 % — LOW (ref 39–50)
HCT VFR BLD CALC: 33.6 % — LOW (ref 39–50)
HGB BLD-MCNC: 11 G/DL — LOW (ref 13–17)
HGB BLD-MCNC: 11 G/DL — LOW (ref 13–17)
MAGNESIUM SERPL-MCNC: 2.2 MG/DL — SIGNIFICANT CHANGE UP (ref 1.6–2.6)
MAGNESIUM SERPL-MCNC: 2.2 MG/DL — SIGNIFICANT CHANGE UP (ref 1.6–2.6)
MCHC RBC-ENTMCNC: 30.8 PG — SIGNIFICANT CHANGE UP (ref 27–34)
MCHC RBC-ENTMCNC: 30.8 PG — SIGNIFICANT CHANGE UP (ref 27–34)
MCHC RBC-ENTMCNC: 32.7 GM/DL — SIGNIFICANT CHANGE UP (ref 32–36)
MCHC RBC-ENTMCNC: 32.7 GM/DL — SIGNIFICANT CHANGE UP (ref 32–36)
MCV RBC AUTO: 94.1 FL — SIGNIFICANT CHANGE UP (ref 80–100)
MCV RBC AUTO: 94.1 FL — SIGNIFICANT CHANGE UP (ref 80–100)
NRBC # BLD: 0 /100 WBCS — SIGNIFICANT CHANGE UP (ref 0–0)
NRBC # BLD: 0 /100 WBCS — SIGNIFICANT CHANGE UP (ref 0–0)
PLATELET # BLD AUTO: 174 K/UL — SIGNIFICANT CHANGE UP (ref 150–400)
PLATELET # BLD AUTO: 174 K/UL — SIGNIFICANT CHANGE UP (ref 150–400)
POTASSIUM SERPL-MCNC: 4 MMOL/L — SIGNIFICANT CHANGE UP (ref 3.5–5.3)
POTASSIUM SERPL-MCNC: 4 MMOL/L — SIGNIFICANT CHANGE UP (ref 3.5–5.3)
POTASSIUM SERPL-SCNC: 4 MMOL/L — SIGNIFICANT CHANGE UP (ref 3.5–5.3)
POTASSIUM SERPL-SCNC: 4 MMOL/L — SIGNIFICANT CHANGE UP (ref 3.5–5.3)
RBC # BLD: 3.57 M/UL — LOW (ref 4.2–5.8)
RBC # BLD: 3.57 M/UL — LOW (ref 4.2–5.8)
RBC # FLD: 15.7 % — HIGH (ref 10.3–14.5)
RBC # FLD: 15.7 % — HIGH (ref 10.3–14.5)
SODIUM SERPL-SCNC: 138 MMOL/L — SIGNIFICANT CHANGE UP (ref 135–145)
SODIUM SERPL-SCNC: 138 MMOL/L — SIGNIFICANT CHANGE UP (ref 135–145)
WBC # BLD: 5.12 K/UL — SIGNIFICANT CHANGE UP (ref 3.8–10.5)
WBC # BLD: 5.12 K/UL — SIGNIFICANT CHANGE UP (ref 3.8–10.5)
WBC # FLD AUTO: 5.12 K/UL — SIGNIFICANT CHANGE UP (ref 3.8–10.5)
WBC # FLD AUTO: 5.12 K/UL — SIGNIFICANT CHANGE UP (ref 3.8–10.5)

## 2023-12-19 PROCEDURE — C1874: CPT

## 2023-12-19 PROCEDURE — 85027 COMPLETE CBC AUTOMATED: CPT

## 2023-12-19 PROCEDURE — 83036 HEMOGLOBIN GLYCOSYLATED A1C: CPT

## 2023-12-19 PROCEDURE — 99239 HOSP IP/OBS DSCHRG MGMT >30: CPT

## 2023-12-19 PROCEDURE — C1769: CPT

## 2023-12-19 PROCEDURE — 93005 ELECTROCARDIOGRAM TRACING: CPT

## 2023-12-19 PROCEDURE — 92937 PRQ TRLUML REVSC CAB GRF 1: CPT

## 2023-12-19 PROCEDURE — 80053 COMPREHEN METABOLIC PANEL: CPT

## 2023-12-19 PROCEDURE — 93459 L HRT ART/GRFT ANGIO: CPT

## 2023-12-19 PROCEDURE — 36415 COLL VENOUS BLD VENIPUNCTURE: CPT

## 2023-12-19 PROCEDURE — C1887: CPT

## 2023-12-19 PROCEDURE — 85730 THROMBOPLASTIN TIME PARTIAL: CPT

## 2023-12-19 PROCEDURE — 82550 ASSAY OF CK (CPK): CPT

## 2023-12-19 PROCEDURE — 80061 LIPID PANEL: CPT

## 2023-12-19 PROCEDURE — 85347 COAGULATION TIME ACTIVATED: CPT

## 2023-12-19 PROCEDURE — C1894: CPT

## 2023-12-19 PROCEDURE — 80048 BASIC METABOLIC PNL TOTAL CA: CPT

## 2023-12-19 PROCEDURE — 85025 COMPLETE CBC W/AUTO DIFF WBC: CPT

## 2023-12-19 PROCEDURE — 82962 GLUCOSE BLOOD TEST: CPT

## 2023-12-19 PROCEDURE — 82553 CREATINE MB FRACTION: CPT

## 2023-12-19 PROCEDURE — 85610 PROTHROMBIN TIME: CPT

## 2023-12-19 PROCEDURE — 83735 ASSAY OF MAGNESIUM: CPT

## 2023-12-19 RX ORDER — ASPIRIN/CALCIUM CARB/MAGNESIUM 324 MG
1 TABLET ORAL
Qty: 14 | Refills: 0
Start: 2023-12-19 | End: 2024-01-01

## 2023-12-19 RX ORDER — CLOPIDOGREL BISULFATE 75 MG/1
1 TABLET, FILM COATED ORAL
Qty: 30 | Refills: 0
Start: 2023-12-19 | End: 2024-01-17

## 2023-12-19 RX ORDER — RIVAROXABAN 15 MG-20MG
1 KIT ORAL
Qty: 30 | Refills: 11
Start: 2023-12-19 | End: 2024-12-12

## 2023-12-19 RX ORDER — RIVAROXABAN 15 MG-20MG
1 KIT ORAL
Qty: 30 | Refills: 0
Start: 2023-12-19 | End: 2024-01-17

## 2023-12-19 RX ORDER — EZETIMIBE AND SIMVASTATIN 10; 80 MG/1; MG/1
1 TABLET, FILM COATED ORAL
Qty: 30 | Refills: 11
Start: 2023-12-19 | End: 2024-12-12

## 2023-12-19 RX ORDER — CLOPIDOGREL BISULFATE 75 MG/1
1 TABLET, FILM COATED ORAL
Qty: 30 | Refills: 11
Start: 2023-12-19 | End: 2024-12-12

## 2023-12-19 RX ORDER — EZETIMIBE AND SIMVASTATIN 10; 80 MG/1; MG/1
1 TABLET, FILM COATED ORAL
Qty: 30 | Refills: 0
Start: 2023-12-19 | End: 2024-01-17

## 2023-12-19 RX ADMIN — Medication 40 MILLIGRAM(S): at 06:31

## 2023-12-19 RX ADMIN — Medication 81 MILLIGRAM(S): at 11:39

## 2023-12-19 RX ADMIN — CLOPIDOGREL BISULFATE 75 MILLIGRAM(S): 75 TABLET, FILM COATED ORAL at 11:39

## 2023-12-19 RX ADMIN — LOSARTAN POTASSIUM 25 MILLIGRAM(S): 100 TABLET, FILM COATED ORAL at 06:31

## 2023-12-19 RX ADMIN — ISOSORBIDE MONONITRATE 60 MILLIGRAM(S): 60 TABLET, EXTENDED RELEASE ORAL at 11:40

## 2023-12-19 RX ADMIN — Medication 3: at 11:39

## 2023-12-19 RX ADMIN — Medication 25 MILLIGRAM(S): at 06:31

## 2023-12-19 NOTE — DISCHARGE NOTE NURSING/CASE MANAGEMENT/SOCIAL WORK - PATIENT PORTAL LINK FT
You can access the FollowMyHealth Patient Portal offered by [facility] by registering at the following website: [email or web address]. By joining [employer]’s FollowMyHealth portal, you will also be able to view your health information using other applications (apps) compatible with our system. You can access the FollowMyHealth Patient Portal offered by [facility] by registering at the following website: [email or web address]. By joining [employer]’s FollowMyHealth portal, you will also be able to view your health information using other applications (apps) compatible with our system.

## 2023-12-19 NOTE — DISCHARGE NOTE NURSING/CASE MANAGEMENT/SOCIAL WORK - NSDCPEFALRISK_GEN_ALL_CORE
For information on Fall & Injury Prevention, visit: [email or web address] OR  [email or web address] OR  https://www.cdc.gov/steadi/patient.html For information on Fall & Injury Prevention, visit: [email or web address] OR  [email or web address] OR  https://www.cdc.gov/steadi/patient.html

## 2023-12-27 DIAGNOSIS — Z79.82 LONG TERM (CURRENT) USE OF ASPIRIN: ICD-10-CM

## 2023-12-27 DIAGNOSIS — I13.0 HYPERTENSIVE HEART AND CHRONIC KIDNEY DISEASE WITH HEART FAILURE AND STAGE 1 THROUGH STAGE 4 CHRONIC KIDNEY DISEASE, OR UNSPECIFIED CHRONIC KIDNEY DISEASE: ICD-10-CM

## 2023-12-27 DIAGNOSIS — I25.10 ATHEROSCLEROTIC HEART DISEASE OF NATIVE CORONARY ARTERY WITHOUT ANGINA PECTORIS: ICD-10-CM

## 2023-12-27 DIAGNOSIS — Z79.01 LONG TERM (CURRENT) USE OF ANTICOAGULANTS: ICD-10-CM

## 2023-12-27 DIAGNOSIS — Z95.1 PRESENCE OF AORTOCORONARY BYPASS GRAFT: ICD-10-CM

## 2023-12-27 DIAGNOSIS — E78.5 HYPERLIPIDEMIA, UNSPECIFIED: ICD-10-CM

## 2023-12-27 DIAGNOSIS — I25.84 CORONARY ATHEROSCLEROSIS DUE TO CALCIFIED CORONARY LESION: ICD-10-CM

## 2023-12-27 DIAGNOSIS — Z79.02 LONG TERM (CURRENT) USE OF ANTITHROMBOTICS/ANTIPLATELETS: ICD-10-CM

## 2023-12-27 DIAGNOSIS — I25.710 ATHEROSCLEROSIS OF AUTOLOGOUS VEIN CORONARY ARTERY BYPASS GRAFT(S) WITH UNSTABLE ANGINA PECTORIS: ICD-10-CM

## 2023-12-27 DIAGNOSIS — Z79.84 LONG TERM (CURRENT) USE OF ORAL HYPOGLYCEMIC DRUGS: ICD-10-CM

## 2023-12-27 DIAGNOSIS — I48.91 UNSPECIFIED ATRIAL FIBRILLATION: ICD-10-CM

## 2023-12-27 DIAGNOSIS — I50.32 CHRONIC DIASTOLIC (CONGESTIVE) HEART FAILURE: ICD-10-CM

## 2023-12-27 DIAGNOSIS — I25.2 OLD MYOCARDIAL INFARCTION: ICD-10-CM

## 2023-12-27 DIAGNOSIS — E11.9 TYPE 2 DIABETES MELLITUS WITHOUT COMPLICATIONS: ICD-10-CM

## 2023-12-27 DIAGNOSIS — Z79.899 OTHER LONG TERM (CURRENT) DRUG THERAPY: ICD-10-CM

## 2023-12-27 DIAGNOSIS — N18.31 CHRONIC KIDNEY DISEASE, STAGE 3A: ICD-10-CM

## 2023-12-27 DIAGNOSIS — Z95.5 PRESENCE OF CORONARY ANGIOPLASTY IMPLANT AND GRAFT: ICD-10-CM

## 2024-01-02 ENCOUNTER — APPOINTMENT (OUTPATIENT)
Dept: HEART AND VASCULAR | Facility: CLINIC | Age: 74
End: 2024-01-02
Payer: MEDICARE

## 2024-01-02 VITALS
BODY MASS INDEX: 36.98 KG/M2 | DIASTOLIC BLOOD PRESSURE: 74 MMHG | SYSTOLIC BLOOD PRESSURE: 171 MMHG | WEIGHT: 244 LBS | OXYGEN SATURATION: 98 % | HEIGHT: 68 IN | HEART RATE: 62 BPM

## 2024-01-02 VITALS — SYSTOLIC BLOOD PRESSURE: 124 MMHG | DIASTOLIC BLOOD PRESSURE: 78 MMHG

## 2024-01-02 VITALS — SYSTOLIC BLOOD PRESSURE: 120 MMHG | DIASTOLIC BLOOD PRESSURE: 76 MMHG

## 2024-01-02 DIAGNOSIS — N18.9 CHRONIC KIDNEY DISEASE, UNSPECIFIED: ICD-10-CM

## 2024-01-02 PROCEDURE — 99214 OFFICE O/P EST MOD 30 MIN: CPT | Mod: 25

## 2024-01-02 PROCEDURE — 93000 ELECTROCARDIOGRAM COMPLETE: CPT

## 2024-01-02 PROCEDURE — 36415 COLL VENOUS BLD VENIPUNCTURE: CPT

## 2024-01-02 RX ORDER — ISOSORBIDE MONONITRATE 60 MG/1
60 TABLET, EXTENDED RELEASE ORAL DAILY
Qty: 1 | Refills: 1 | Status: DISCONTINUED | COMMUNITY
Start: 2023-06-15 | End: 2024-01-02

## 2024-01-03 LAB
ANION GAP SERPL CALC-SCNC: 12 MMOL/L
BUN SERPL-MCNC: 49 MG/DL
CALCIUM SERPL-MCNC: 8.9 MG/DL
CHLORIDE SERPL-SCNC: 104 MMOL/L
CO2 SERPL-SCNC: 23 MMOL/L
CREAT SERPL-MCNC: 1.65 MG/DL
EGFR: 43 ML/MIN/1.73M2
GLUCOSE SERPL-MCNC: 248 MG/DL
POTASSIUM SERPL-SCNC: 4.8 MMOL/L
SODIUM SERPL-SCNC: 139 MMOL/L

## 2024-01-04 NOTE — PHYSICAL EXAM
[Well Developed] : well developed [Well Nourished] : well nourished [No Acute Distress] : no acute distress [Normal Venous Pressure] : normal venous pressure [No Carotid Bruit] : no carotid bruit [Normal S1, S2] : normal S1, S2 [No Murmur] : no murmur [No Rub] : no rub [No Gallop] : no gallop [Clear Lung Fields] : clear lung fields [Good Air Entry] : good air entry [No Respiratory Distress] : no respiratory distress  [Soft] : abdomen soft [Non Tender] : non-tender [No Masses/organomegaly] : no masses/organomegaly [Normal Bowel Sounds] : normal bowel sounds [Normal Gait] : normal gait [No Edema] : no edema [No Cyanosis] : no cyanosis [No Clubbing] : no clubbing [No Varicosities] : no varicosities [Normal Radial B/L] : normal radial B/L [Diminished Pedal Pulses ___] : diminished pedal pulses [unfilled] [No Rash] : no rash [No Skin Lesions] : no skin lesions [Moves all extremities] : moves all extremities [No Focal Deficits] : no focal deficits [Normal Speech] : normal speech [Alert and Oriented] : alert and oriented [Normal memory] : normal memory [de-identified] : O/W

## 2024-01-04 NOTE — DISCUSSION/SUMMARY
[FreeTextEntry1] : EKG:NSR,First Degree AVB continue plavix + ranexa + toprol  and taper(and DC) Imdur for CAD;Vytorin for HLD; losartan fro HPTN; Xarelto for PAF

## 2024-02-26 RX ORDER — FUROSEMIDE 40 MG/1
40 TABLET ORAL DAILY
Qty: 90 | Refills: 1 | Status: ACTIVE | COMMUNITY
Start: 1900-01-01 | End: 1900-01-01

## 2024-03-31 ENCOUNTER — RX RENEWAL (OUTPATIENT)
Age: 74
End: 2024-03-31

## 2024-03-31 RX ORDER — CLOPIDOGREL BISULFATE 75 MG/1
75 TABLET, FILM COATED ORAL DAILY
Qty: 90 | Refills: 1 | Status: ACTIVE | COMMUNITY
Start: 1900-01-01 | End: 1900-01-01

## 2024-04-02 ENCOUNTER — RX RENEWAL (OUTPATIENT)
Age: 74
End: 2024-04-02

## 2024-04-02 ENCOUNTER — APPOINTMENT (OUTPATIENT)
Dept: HEART AND VASCULAR | Facility: CLINIC | Age: 74
End: 2024-04-02
Payer: MEDICARE

## 2024-04-02 VITALS
DIASTOLIC BLOOD PRESSURE: 68 MMHG | WEIGHT: 240 LBS | HEIGHT: 68 IN | HEART RATE: 66 BPM | SYSTOLIC BLOOD PRESSURE: 148 MMHG | TEMPERATURE: 97.5 F | OXYGEN SATURATION: 98 % | BODY MASS INDEX: 36.37 KG/M2

## 2024-04-02 VITALS — DIASTOLIC BLOOD PRESSURE: 66 MMHG | SYSTOLIC BLOOD PRESSURE: 132 MMHG

## 2024-04-02 DIAGNOSIS — I48.92 UNSPECIFIED ATRIAL FLUTTER: ICD-10-CM

## 2024-04-02 PROCEDURE — 99214 OFFICE O/P EST MOD 30 MIN: CPT

## 2024-04-02 PROCEDURE — G2211 COMPLEX E/M VISIT ADD ON: CPT

## 2024-04-02 PROCEDURE — 93000 ELECTROCARDIOGRAM COMPLETE: CPT

## 2024-04-02 NOTE — HISTORY OF PRESENT ILLNESS
[FreeTextEntry1] : still with claudication- not walking too much- reports sscp when exerts himself too much- has had it pre-PCI - no sob
denies

## 2024-04-02 NOTE — DISCUSSION/SUMMARY
[FreeTextEntry1] : EKG:NSR,First degree AVB reviewed recent labs- elevated creatinine(PCP f/u) and LDL not at goal- advised Repatha won''t take injections continue Vytorin and diet/weight loss for HLD exercise/walk for claudication/ and vascular f/u  feel cp is angina due to residual CAD- had it before pCI so don't feel represents ISR add ranexa and continue Toprol + Plavix for CAD /CP and he will call me in 3  weeks- continue losartan for hPTN,xarelto for PAF needs better diabetic control

## 2024-04-02 NOTE — PHYSICAL EXAM
[Well Developed] : well developed [No Acute Distress] : no acute distress [Well Nourished] : well nourished [Normal Venous Pressure] : normal venous pressure [No Carotid Bruit] : no carotid bruit [Normal S1, S2] : normal S1, S2 [No Rub] : no rub [No Murmur] : no murmur [No Gallop] : no gallop [Clear Lung Fields] : clear lung fields [Good Air Entry] : good air entry [No Respiratory Distress] : no respiratory distress  [Soft] : abdomen soft [Non Tender] : non-tender [No Masses/organomegaly] : no masses/organomegaly [Normal Bowel Sounds] : normal bowel sounds [Normal Gait] : normal gait [No Edema] : no edema [No Clubbing] : no clubbing [No Cyanosis] : no cyanosis [No Varicosities] : no varicosities [Normal Radial B/L] : normal radial B/L [Diminished Pedal Pulses ___] : diminished pedal pulses [unfilled] [No Rash] : no rash [No Skin Lesions] : no skin lesions [Moves all extremities] : moves all extremities [No Focal Deficits] : no focal deficits [Normal Speech] : normal speech [Normal memory] : normal memory [Alert and Oriented] : alert and oriented [de-identified] : O/W

## 2024-04-19 NOTE — PATIENT PROFILE ADULT. - ATTEMPT TO OOB
Thank you for allowing me to participate in your care today. It is an honor to be a part of your healthcare team at [facility]. If you had labs ordered today, you will receive notification via [employer], phone call or mailed letter regarding your results within 7 days. If you have any questions or concerns regarding your visit today, please do not hesitate to contact us.    Sincerely,   Tram Choe, NP-C     Most UPPER RESPIRATORY INFECTIONS are caused by viruses.    Antibiotics will not make the infection clear more quickly.  Using antibiotics when they are not needed can cause germs that cause infections to become resistant, making them more difficult to cure.  Therefore, no antibiotics are prescribed today.  Viruses can last for 10-14 days, so please be advised that the symptoms may initially worsen before improving.     Symptomatic treatment is advised:   Nasal saline spray, humidifiers, and steamy showers are helpful for runny noses or nasal congestion.   Warm salt water gargles are helpful for sore or scratchy throats. Honey may be given for those over 12 months of age for throat irritation.   Increase water intake.   If over the age of 4, you may use OTC cough medications specific for symptoms being experienced.    If symptoms are not improving in 1 week, please contact office for a follow-up appointment.       
no

## 2024-05-04 ENCOUNTER — RX RENEWAL (OUTPATIENT)
Age: 74
End: 2024-05-04

## 2024-05-04 RX ORDER — RIVAROXABAN 15 MG/1
15 TABLET, FILM COATED ORAL
Qty: 90 | Refills: 0 | Status: ACTIVE | COMMUNITY
Start: 1900-01-01 | End: 1900-01-01

## 2024-05-04 RX ORDER — METOPROLOL SUCCINATE 25 MG/1
25 TABLET, EXTENDED RELEASE ORAL DAILY
Qty: 90 | Refills: 1 | Status: ACTIVE | COMMUNITY
Start: 2023-06-15 | End: 1900-01-01

## 2024-05-21 NOTE — ED PROVIDER NOTE - PHYSICAL EXAMINATION
Detail Level: Detailed R medial thigh: ~quarter sized superficial ulcer w/ scant surrounding erythema - wife states this is chronic and unchanged. LLE erythema to jenni region - this is why he is on keflex and has been improving.  2+ b/l LE pitting edema to thighs, also w/ trace pitting edema to abd wall  bibasilar crackles.  unofficial bedside sono: diffue b-lines.  Pt able to ambulate in ED w/o significant tachypnea.  asymptomatic and no tachypnea at rest.   RLQ small scab w/ minimal localized erythema.

## 2024-06-24 ENCOUNTER — NON-APPOINTMENT (OUTPATIENT)
Age: 74
End: 2024-06-24

## 2024-06-25 ENCOUNTER — APPOINTMENT (OUTPATIENT)
Dept: HEART AND VASCULAR | Facility: CLINIC | Age: 74
End: 2024-06-25
Payer: MEDICARE

## 2024-06-25 VITALS
DIASTOLIC BLOOD PRESSURE: 64 MMHG | BODY MASS INDEX: 36.22 KG/M2 | OXYGEN SATURATION: 98 % | TEMPERATURE: 97 F | WEIGHT: 239 LBS | HEART RATE: 70 BPM | SYSTOLIC BLOOD PRESSURE: 118 MMHG | HEIGHT: 68 IN

## 2024-06-25 DIAGNOSIS — I25.10 ATHEROSCLEROTIC HEART DISEASE OF NATIVE CORONARY ARTERY W/OUT ANGINA PECTORIS: ICD-10-CM

## 2024-06-25 DIAGNOSIS — I70.219 ATHEROSCLEROSIS OF NATIVE ARTERIES OF EXTREMITIES WITH INTERMITTENT CLAUDICATION, UNSPECIFIED EXTREMITY: ICD-10-CM

## 2024-06-25 DIAGNOSIS — I73.9 PERIPHERAL VASCULAR DISEASE, UNSPECIFIED: ICD-10-CM

## 2024-06-25 DIAGNOSIS — R07.89 OTHER CHEST PAIN: ICD-10-CM

## 2024-06-25 DIAGNOSIS — E78.5 HYPERLIPIDEMIA, UNSPECIFIED: ICD-10-CM

## 2024-06-25 DIAGNOSIS — I10 ESSENTIAL (PRIMARY) HYPERTENSION: ICD-10-CM

## 2024-06-25 PROCEDURE — 99214 OFFICE O/P EST MOD 30 MIN: CPT

## 2024-06-25 PROCEDURE — G2211 COMPLEX E/M VISIT ADD ON: CPT

## 2024-06-25 PROCEDURE — 93000 ELECTROCARDIOGRAM COMPLETE: CPT

## 2024-06-25 RX ORDER — RANOLAZINE 1000 MG/1
1000 TABLET, EXTENDED RELEASE ORAL
Qty: 180 | Refills: 1 | Status: ACTIVE | COMMUNITY
Start: 2023-12-14 | End: 1900-01-01

## 2024-07-17 ENCOUNTER — RX RENEWAL (OUTPATIENT)
Age: 74
End: 2024-07-17

## 2024-08-14 NOTE — PROGRESS NOTE ADULT - PROBLEM SELECTOR PLAN 5
Yes
A1c 8.2.  -Hold oral antihyperglycemics. Moderate dose sliding scale
-Baseline Cr 1.8-2.  Cr on admission 1.6  - Cr improving  - If cr worsens, stop losartan  - Monitor renal function, electrolytes, BP, urine output and volume status.
-Baseline Cr 1.8-2.  Cr on admission 1.6  - Cr improving  - If cr worsens, stop losartan  - Monitor renal function, electrolytes, BP, urine output and volume status.
A1c 8.2.  -Hold oral antihyperglycemics. Moderate dose sliding scale

## 2024-09-03 DIAGNOSIS — I25.10 ATHEROSCLEROTIC HEART DISEASE OF NATIVE CORONARY ARTERY W/OUT ANGINA PECTORIS: ICD-10-CM

## 2024-09-03 RX ORDER — EVOLOCUMAB 140 MG/ML
140 INJECTION, SOLUTION SUBCUTANEOUS
Qty: 1 | Refills: 5 | Status: ACTIVE | COMMUNITY
Start: 2024-09-03 | End: 1900-01-01

## 2024-09-03 RX ORDER — EVOLOCUMAB 140 MG/ML
140 INJECTION, SOLUTION SUBCUTANEOUS
Qty: 3 | Refills: 1 | Status: ACTIVE | COMMUNITY
Start: 2024-09-03 | End: 1900-01-01

## 2024-09-09 ENCOUNTER — APPOINTMENT (OUTPATIENT)
Dept: VASCULAR SURGERY | Facility: CLINIC | Age: 74
End: 2024-09-09
Payer: MEDICARE

## 2024-09-09 VITALS
BODY MASS INDEX: 35.01 KG/M2 | HEIGHT: 68 IN | WEIGHT: 231 LBS | HEART RATE: 58 BPM | DIASTOLIC BLOOD PRESSURE: 68 MMHG | SYSTOLIC BLOOD PRESSURE: 160 MMHG

## 2024-09-09 DIAGNOSIS — I73.9 PERIPHERAL VASCULAR DISEASE, UNSPECIFIED: ICD-10-CM

## 2024-09-09 PROCEDURE — 99205 OFFICE O/P NEW HI 60 MIN: CPT

## 2024-09-09 PROCEDURE — 93923 UPR/LXTR ART STDY 3+ LVLS: CPT

## 2024-09-10 RX ORDER — EZETIMIBE AND SIMVASTATIN 10; 80 MG/1; MG/1
10-80 TABLET ORAL
Refills: 0 | Status: ACTIVE | COMMUNITY

## 2024-09-11 NOTE — ASSESSMENT
[Arterial/Venous Disease] : arterial/venous disease [FreeTextEntry1] : 74yoM with multiple medical conditions who is referred by Dr. Sharpe to be evaluated for PAD. Patient with 4-5 blocks claudication, no rest pain, skin changes. On exam, both legs are well perfused, no skin discoloration, diminished but palpable peripheral pulses. JESENIA/PVR was done in the office that demonstrated RLE 0.82, LLE 0.87. We discussed the findings and explained that no vascular intervention is needed at this time. Recommended to walk on a daily basis and stay active. He will f/u in 6 months or sooner if his symptoms worsen.  I spent a total of 60 minutes in this encounter.

## 2024-09-11 NOTE — ASSESSMENT
[Arterial/Venous Disease] : arterial/venous disease [FreeTextEntry1] : 74yoM with multiple medical conditions who is referred by Dr. Sharpe to be evaluated for PAD. Patient with 4-5 blocks claudication, no rest pain, skin changes. On exam, both legs are well perfused, no skin discoloration, diminished but palpable peripheral pulses. JEESNIA/PVR was done in the office that demonstrated RLE 0.82, LLE 0.87. We discussed the findings and explained that no vascular intervention is needed at this time. Recommended to walk on a daily basis and stay active. He will f/u in 6 months or sooner if his symptoms worsen.  I spent a total of 60 minutes in this encounter.

## 2024-09-11 NOTE — PHYSICAL EXAM
[Respiratory Effort] : normal respiratory effort [Normal Heart Sounds] : normal heart sounds [Alert] : alert [2+] : left 2+ [0] : right 0 [1+] : left 1+ [No Rash or Lesion] : No rash or lesion [Ankle Swelling (On Exam)] : not present [Abdomen Tenderness] : ~T ~M No abdominal tenderness [de-identified] : WN/WD, NAD [de-identified] : NC/AT [de-identified] : supple [de-identified] : FROM

## 2024-09-11 NOTE — PHYSICAL EXAM
[Respiratory Effort] : normal respiratory effort [Normal Heart Sounds] : normal heart sounds [Alert] : alert [2+] : left 2+ [0] : right 0 [1+] : left 1+ [No Rash or Lesion] : No rash or lesion [Ankle Swelling (On Exam)] : not present [Abdomen Tenderness] : ~T ~M No abdominal tenderness [de-identified] : WN/WD, NAD [de-identified] : NC/AT [de-identified] : supple [de-identified] : FROM

## 2024-09-11 NOTE — ADDENDUM
[FreeTextEntry1] : This note was written by Germania OCASIO, acting as a scribe for Dr. Ronnie Andrews.  I, Dr. Ronnie Andrews, have read and attest that all the information, medical decision-making, and discharge instructions within are true and accurate.  I, Dr. Ronnie Andrews, personally performed the evaluation and management (E/M) services for this new patient.  That E/M includes conducting the initial examination, assessing all conditions, and establishing the plan of care.  Today, my ACP, Germania OCASIO, was here to observe my evaluation and management services for this patient to be followed going forward.

## 2024-09-11 NOTE — PHYSICAL EXAM
[Respiratory Effort] : normal respiratory effort [Normal Heart Sounds] : normal heart sounds [Alert] : alert [2+] : left 2+ [0] : right 0 [1+] : left 1+ [No Rash or Lesion] : No rash or lesion [Ankle Swelling (On Exam)] : not present [Abdomen Tenderness] : ~T ~M No abdominal tenderness [de-identified] : WN/WD, NAD [de-identified] : NC/AT [de-identified] : supple [de-identified] : FROM

## 2024-09-11 NOTE — HISTORY OF PRESENT ILLNESS
[FreeTextEntry1] : 74yoM, with PMHx of HTN, HLD, T2DM, HFpEF (EF 60%, 6/03/23), pAFib (on Xarelto), CAD s/p CABG x2 (LIMA-LAD, reverse SVG-PDA, 2017), s/p multiple PCIs, diabetic neuropathy, CKD 3 who is referred by Dr. Sharpe to be evaluated for LEs claudication. Patient states that he has been experiencing legs pain after walking 4-5 blocks and he has to stop to rest, denies rest pain, previous interventions, rest pain, skin changes.

## 2024-09-18 ENCOUNTER — RX RENEWAL (OUTPATIENT)
Age: 74
End: 2024-09-18

## 2024-10-14 ENCOUNTER — APPOINTMENT (OUTPATIENT)
Dept: HEART AND VASCULAR | Facility: CLINIC | Age: 74
End: 2024-10-14
Payer: MEDICARE

## 2024-10-14 VITALS
WEIGHT: 231 LBS | SYSTOLIC BLOOD PRESSURE: 150 MMHG | TEMPERATURE: 97.3 F | OXYGEN SATURATION: 98 % | DIASTOLIC BLOOD PRESSURE: 72 MMHG | BODY MASS INDEX: 35.01 KG/M2 | HEART RATE: 70 BPM | HEIGHT: 68 IN

## 2024-10-14 VITALS — DIASTOLIC BLOOD PRESSURE: 70 MMHG | SYSTOLIC BLOOD PRESSURE: 136 MMHG

## 2024-10-14 DIAGNOSIS — I73.9 PERIPHERAL VASCULAR DISEASE, UNSPECIFIED: ICD-10-CM

## 2024-10-14 DIAGNOSIS — I10 ESSENTIAL (PRIMARY) HYPERTENSION: ICD-10-CM

## 2024-10-14 DIAGNOSIS — I48.92 UNSPECIFIED ATRIAL FLUTTER: ICD-10-CM

## 2024-10-14 DIAGNOSIS — E78.5 HYPERLIPIDEMIA, UNSPECIFIED: ICD-10-CM

## 2024-10-14 DIAGNOSIS — R94.31 ABNORMAL ELECTROCARDIOGRAM [ECG] [EKG]: ICD-10-CM

## 2024-10-14 DIAGNOSIS — I25.10 ATHEROSCLEROTIC HEART DISEASE OF NATIVE CORONARY ARTERY W/OUT ANGINA PECTORIS: ICD-10-CM

## 2024-10-14 PROCEDURE — 93000 ELECTROCARDIOGRAM COMPLETE: CPT

## 2024-10-14 PROCEDURE — G2211 COMPLEX E/M VISIT ADD ON: CPT

## 2024-10-14 PROCEDURE — 99214 OFFICE O/P EST MOD 30 MIN: CPT

## 2024-10-14 RX ORDER — SIMVASTATIN 80 MG/1
TABLET, FILM COATED ORAL
Refills: 0 | Status: DISCONTINUED | COMMUNITY

## 2024-10-14 RX ORDER — CLINDAMYCIN HYDROCHLORIDE 300 MG/1
300 CAPSULE ORAL
Refills: 0 | Status: ACTIVE | COMMUNITY

## 2024-10-14 RX ORDER — METOPROLOL SUCCINATE 25 MG/1
25 TABLET, EXTENDED RELEASE ORAL
Refills: 0 | Status: ACTIVE | COMMUNITY

## 2024-11-26 ENCOUNTER — RX RENEWAL (OUTPATIENT)
Age: 74
End: 2024-11-26

## 2025-02-04 ENCOUNTER — APPOINTMENT (OUTPATIENT)
Dept: HEART AND VASCULAR | Facility: CLINIC | Age: 75
End: 2025-02-04
Payer: MEDICARE

## 2025-02-04 ENCOUNTER — NON-APPOINTMENT (OUTPATIENT)
Age: 75
End: 2025-02-04

## 2025-02-04 ENCOUNTER — RX RENEWAL (OUTPATIENT)
Age: 75
End: 2025-02-04

## 2025-02-04 VITALS
WEIGHT: 219 LBS | BODY MASS INDEX: 33.19 KG/M2 | OXYGEN SATURATION: 98 % | TEMPERATURE: 97.3 F | HEART RATE: 76 BPM | HEIGHT: 68 IN | DIASTOLIC BLOOD PRESSURE: 62 MMHG | SYSTOLIC BLOOD PRESSURE: 130 MMHG

## 2025-02-04 DIAGNOSIS — I48.92 UNSPECIFIED ATRIAL FLUTTER: ICD-10-CM

## 2025-02-04 DIAGNOSIS — I25.10 ATHEROSCLEROTIC HEART DISEASE OF NATIVE CORONARY ARTERY W/OUT ANGINA PECTORIS: ICD-10-CM

## 2025-02-04 DIAGNOSIS — E78.5 HYPERLIPIDEMIA, UNSPECIFIED: ICD-10-CM

## 2025-02-04 DIAGNOSIS — I34.0 NONRHEUMATIC MITRAL (VALVE) INSUFFICIENCY: ICD-10-CM

## 2025-02-04 DIAGNOSIS — I70.219 ATHEROSCLEROSIS OF NATIVE ARTERIES OF EXTREMITIES WITH INTERMITTENT CLAUDICATION, UNSPECIFIED EXTREMITY: ICD-10-CM

## 2025-02-04 DIAGNOSIS — I10 ESSENTIAL (PRIMARY) HYPERTENSION: ICD-10-CM

## 2025-02-04 DIAGNOSIS — I44.7 LEFT BUNDLE-BRANCH BLOCK, UNSPECIFIED: ICD-10-CM

## 2025-02-04 DIAGNOSIS — R07.89 OTHER CHEST PAIN: ICD-10-CM

## 2025-02-04 DIAGNOSIS — I73.9 PERIPHERAL VASCULAR DISEASE, UNSPECIFIED: ICD-10-CM

## 2025-02-04 PROCEDURE — 93306 TTE W/DOPPLER COMPLETE: CPT

## 2025-02-04 PROCEDURE — 93000 ELECTROCARDIOGRAM COMPLETE: CPT

## 2025-02-04 PROCEDURE — 99214 OFFICE O/P EST MOD 30 MIN: CPT | Mod: 25

## 2025-02-04 RX ORDER — ISOSORBIDE MONONITRATE 60 MG/1
60 TABLET, EXTENDED RELEASE ORAL DAILY
Qty: 90 | Refills: 0 | Status: ACTIVE | COMMUNITY
Start: 2025-02-04 | End: 1900-01-01

## 2025-02-18 ENCOUNTER — RESULT REVIEW (OUTPATIENT)
Age: 75
End: 2025-02-18

## 2025-02-18 ENCOUNTER — OUTPATIENT (OUTPATIENT)
Dept: OUTPATIENT SERVICES | Facility: HOSPITAL | Age: 75
LOS: 1 days | End: 2025-02-18
Payer: MEDICARE

## 2025-02-18 ENCOUNTER — NON-APPOINTMENT (OUTPATIENT)
Age: 75
End: 2025-02-18

## 2025-02-18 DIAGNOSIS — Z95.1 PRESENCE OF AORTOCORONARY BYPASS GRAFT: Chronic | ICD-10-CM

## 2025-02-18 DIAGNOSIS — R07.9 CHEST PAIN, UNSPECIFIED: ICD-10-CM

## 2025-02-18 DIAGNOSIS — I25.10 ATHEROSCLEROTIC HEART DISEASE OF NATIVE CORONARY ARTERY WITHOUT ANGINA PECTORIS: ICD-10-CM

## 2025-02-18 DIAGNOSIS — Z95.5 PRESENCE OF CORONARY ANGIOPLASTY IMPLANT AND GRAFT: Chronic | ICD-10-CM

## 2025-02-18 PROCEDURE — 93016 CV STRESS TEST SUPVJ ONLY: CPT

## 2025-02-18 PROCEDURE — 78452 HT MUSCLE IMAGE SPECT MULT: CPT

## 2025-02-18 PROCEDURE — 93017 CV STRESS TEST TRACING ONLY: CPT

## 2025-02-18 PROCEDURE — 93018 CV STRESS TEST I&R ONLY: CPT

## 2025-02-18 PROCEDURE — 78452 HT MUSCLE IMAGE SPECT MULT: CPT | Mod: 26

## 2025-02-18 PROCEDURE — A9500: CPT

## 2025-02-18 PROCEDURE — 82962 GLUCOSE BLOOD TEST: CPT

## 2025-04-29 ENCOUNTER — APPOINTMENT (OUTPATIENT)
Dept: HEART AND VASCULAR | Facility: CLINIC | Age: 75
End: 2025-04-29
Payer: MEDICARE

## 2025-04-29 VITALS
HEART RATE: 74 BPM | BODY MASS INDEX: 33.19 KG/M2 | DIASTOLIC BLOOD PRESSURE: 60 MMHG | TEMPERATURE: 97.2 F | HEIGHT: 68 IN | SYSTOLIC BLOOD PRESSURE: 124 MMHG | WEIGHT: 219 LBS | OXYGEN SATURATION: 98 %

## 2025-04-29 DIAGNOSIS — E78.5 HYPERLIPIDEMIA, UNSPECIFIED: ICD-10-CM

## 2025-04-29 DIAGNOSIS — I48.92 UNSPECIFIED ATRIAL FLUTTER: ICD-10-CM

## 2025-04-29 DIAGNOSIS — I73.9 PERIPHERAL VASCULAR DISEASE, UNSPECIFIED: ICD-10-CM

## 2025-04-29 DIAGNOSIS — I25.10 ATHEROSCLEROTIC HEART DISEASE OF NATIVE CORONARY ARTERY W/OUT ANGINA PECTORIS: ICD-10-CM

## 2025-04-29 DIAGNOSIS — I10 ESSENTIAL (PRIMARY) HYPERTENSION: ICD-10-CM

## 2025-04-29 DIAGNOSIS — I44.7 LEFT BUNDLE-BRANCH BLOCK, UNSPECIFIED: ICD-10-CM

## 2025-04-29 PROCEDURE — 93000 ELECTROCARDIOGRAM COMPLETE: CPT

## 2025-04-29 PROCEDURE — G2211 COMPLEX E/M VISIT ADD ON: CPT

## 2025-04-29 PROCEDURE — 99214 OFFICE O/P EST MOD 30 MIN: CPT

## 2025-05-05 RX ORDER — SIMVASTATIN 40 MG/1
40 TABLET, FILM COATED ORAL DAILY
Qty: 1 | Refills: 1 | Status: ACTIVE | COMMUNITY
Start: 2025-05-05 | End: 1900-01-01

## 2025-05-12 ENCOUNTER — NON-APPOINTMENT (OUTPATIENT)
Age: 75
End: 2025-05-12

## 2025-05-20 ENCOUNTER — RX RENEWAL (OUTPATIENT)
Age: 75
End: 2025-05-20

## 2025-08-05 ENCOUNTER — APPOINTMENT (OUTPATIENT)
Dept: HEART AND VASCULAR | Facility: CLINIC | Age: 75
End: 2025-08-05
Payer: MEDICARE

## 2025-08-05 VITALS
SYSTOLIC BLOOD PRESSURE: 145 MMHG | BODY MASS INDEX: 32.58 KG/M2 | HEIGHT: 68 IN | WEIGHT: 215 LBS | HEART RATE: 66 BPM | DIASTOLIC BLOOD PRESSURE: 60 MMHG | OXYGEN SATURATION: 96 % | TEMPERATURE: 97.1 F

## 2025-08-05 DIAGNOSIS — I50.9 HEART FAILURE, UNSPECIFIED: ICD-10-CM

## 2025-08-05 DIAGNOSIS — E78.5 HYPERLIPIDEMIA, UNSPECIFIED: ICD-10-CM

## 2025-08-05 DIAGNOSIS — I10 ESSENTIAL (PRIMARY) HYPERTENSION: ICD-10-CM

## 2025-08-05 DIAGNOSIS — I25.10 ATHEROSCLEROTIC HEART DISEASE OF NATIVE CORONARY ARTERY W/OUT ANGINA PECTORIS: ICD-10-CM

## 2025-08-05 DIAGNOSIS — R07.89 OTHER CHEST PAIN: ICD-10-CM

## 2025-08-05 DIAGNOSIS — R06.09 OTHER FORMS OF DYSPNEA: ICD-10-CM

## 2025-08-05 DIAGNOSIS — I48.92 UNSPECIFIED ATRIAL FLUTTER: ICD-10-CM

## 2025-08-05 DIAGNOSIS — I44.7 LEFT BUNDLE-BRANCH BLOCK, UNSPECIFIED: ICD-10-CM

## 2025-08-05 PROCEDURE — 99215 OFFICE O/P EST HI 40 MIN: CPT

## 2025-08-05 PROCEDURE — 93000 ELECTROCARDIOGRAM COMPLETE: CPT

## 2025-08-05 PROCEDURE — G2211 COMPLEX E/M VISIT ADD ON: CPT

## 2025-08-05 RX ORDER — EZETIMIBE AND SIMVASTATIN 10; 40 MG/1; MG/1
10-40 TABLET ORAL
Qty: 90 | Refills: 1 | Status: ACTIVE | COMMUNITY
Start: 2025-08-05 | End: 1900-01-01

## 2025-08-08 ENCOUNTER — NON-APPOINTMENT (OUTPATIENT)
Age: 75
End: 2025-08-08

## 2025-09-03 ENCOUNTER — RX RENEWAL (OUTPATIENT)
Age: 75
End: 2025-09-03

## 2025-09-04 ENCOUNTER — APPOINTMENT (OUTPATIENT)
Dept: HEART AND VASCULAR | Facility: CLINIC | Age: 75
End: 2025-09-04
Payer: MEDICARE

## 2025-09-04 VITALS
SYSTOLIC BLOOD PRESSURE: 138 MMHG | TEMPERATURE: 97.3 F | DIASTOLIC BLOOD PRESSURE: 72 MMHG | WEIGHT: 210 LBS | OXYGEN SATURATION: 98 % | BODY MASS INDEX: 31.83 KG/M2 | HEIGHT: 68 IN | HEART RATE: 77 BPM

## 2025-09-04 DIAGNOSIS — I10 ESSENTIAL (PRIMARY) HYPERTENSION: ICD-10-CM

## 2025-09-04 DIAGNOSIS — R06.09 OTHER FORMS OF DYSPNEA: ICD-10-CM

## 2025-09-04 DIAGNOSIS — R07.89 OTHER CHEST PAIN: ICD-10-CM

## 2025-09-04 DIAGNOSIS — I25.10 ATHEROSCLEROTIC HEART DISEASE OF NATIVE CORONARY ARTERY W/OUT ANGINA PECTORIS: ICD-10-CM

## 2025-09-04 DIAGNOSIS — E78.5 HYPERLIPIDEMIA, UNSPECIFIED: ICD-10-CM

## 2025-09-04 DIAGNOSIS — I25.118 ATHEROSCLEROTIC HEART DISEASE OF NATIVE CORONARY ARTERY WITH OTHER FORMS OF ANGINA PECTORIS: ICD-10-CM

## 2025-09-04 DIAGNOSIS — I44.7 LEFT BUNDLE-BRANCH BLOCK, UNSPECIFIED: ICD-10-CM

## 2025-09-04 PROCEDURE — 93000 ELECTROCARDIOGRAM COMPLETE: CPT

## 2025-09-04 PROCEDURE — 99214 OFFICE O/P EST MOD 30 MIN: CPT

## 2025-09-04 PROCEDURE — G2211 COMPLEX E/M VISIT ADD ON: CPT

## 2025-09-04 RX ORDER — EMPAGLIFLOZIN 10 MG/1
10 TABLET, FILM COATED ORAL
Refills: 0 | Status: ACTIVE | COMMUNITY